# Patient Record
Sex: FEMALE | Race: WHITE | Employment: OTHER | URBAN - NONMETROPOLITAN AREA
[De-identification: names, ages, dates, MRNs, and addresses within clinical notes are randomized per-mention and may not be internally consistent; named-entity substitution may affect disease eponyms.]

---

## 2017-04-28 ENCOUNTER — HOSPITAL ENCOUNTER (OUTPATIENT)
Dept: WOMENS IMAGING | Age: 57
Discharge: HOME | End: 2017-04-28
Payer: MEDICARE

## 2017-04-28 DIAGNOSIS — Z12.31 ENCOUNTER FOR SCREENING MAMMOGRAM FOR BREAST CANCER: ICD-10-CM

## 2017-04-28 PROCEDURE — 77063 BREAST TOMOSYNTHESIS BI: CPT

## 2017-05-31 ENCOUNTER — OFFICE VISIT (OUTPATIENT)
Dept: CARDIOLOGY | Age: 57
End: 2017-05-31
Payer: MEDICARE

## 2017-05-31 VITALS
HEIGHT: 60 IN | BODY MASS INDEX: 39.46 KG/M2 | WEIGHT: 201 LBS | DIASTOLIC BLOOD PRESSURE: 74 MMHG | HEART RATE: 80 BPM | SYSTOLIC BLOOD PRESSURE: 112 MMHG

## 2017-05-31 DIAGNOSIS — I10 ESSENTIAL HYPERTENSION: ICD-10-CM

## 2017-05-31 DIAGNOSIS — Z72.0 TOBACCO ABUSE: ICD-10-CM

## 2017-05-31 DIAGNOSIS — E78.2 MIXED HYPERLIPIDEMIA: ICD-10-CM

## 2017-05-31 DIAGNOSIS — I25.10 CORONARY ARTERY DISEASE INVOLVING NATIVE CORONARY ARTERY OF NATIVE HEART WITHOUT ANGINA PECTORIS: Primary | ICD-10-CM

## 2017-05-31 PROCEDURE — G8427 DOCREV CUR MEDS BY ELIG CLIN: HCPCS | Performed by: CLINICAL NURSE SPECIALIST

## 2017-05-31 PROCEDURE — 3014F SCREEN MAMMO DOC REV: CPT | Performed by: CLINICAL NURSE SPECIALIST

## 2017-05-31 PROCEDURE — 99213 OFFICE O/P EST LOW 20 MIN: CPT | Performed by: CLINICAL NURSE SPECIALIST

## 2017-05-31 PROCEDURE — 4004F PT TOBACCO SCREEN RCVD TLK: CPT | Performed by: CLINICAL NURSE SPECIALIST

## 2017-05-31 PROCEDURE — 3017F COLORECTAL CA SCREEN DOC REV: CPT | Performed by: CLINICAL NURSE SPECIALIST

## 2017-05-31 PROCEDURE — G8417 CALC BMI ABV UP PARAM F/U: HCPCS | Performed by: CLINICAL NURSE SPECIALIST

## 2017-05-31 PROCEDURE — 93000 ELECTROCARDIOGRAM COMPLETE: CPT | Performed by: CLINICAL NURSE SPECIALIST

## 2017-05-31 PROCEDURE — G8598 ASA/ANTIPLAT THER USED: HCPCS | Performed by: CLINICAL NURSE SPECIALIST

## 2017-05-31 RX ORDER — NITROGLYCERIN 0.4 MG/1
0.4 TABLET SUBLINGUAL EVERY 5 MIN PRN
COMMUNITY
End: 2017-11-30 | Stop reason: DRUGHIGH

## 2017-05-31 RX ORDER — POLYETHYLENE GLYCOL 3350 17 G/17G
17 POWDER, FOR SOLUTION ORAL PRN
COMMUNITY
Start: 2017-05-16 | End: 2019-03-12

## 2017-05-31 RX ORDER — OMEPRAZOLE 20 MG/1
20 CAPSULE, DELAYED RELEASE ORAL DAILY
COMMUNITY
Start: 2017-05-16

## 2017-05-31 ASSESSMENT — ENCOUNTER SYMPTOMS
HEARTBURN: 0
NAUSEA: 0
SHORTNESS OF BREATH: 0
ABDOMINAL PAIN: 0
COUGH: 0
ORTHOPNEA: 0
BLURRED VISION: 0
VOMITING: 0

## 2017-11-30 ENCOUNTER — OFFICE VISIT (OUTPATIENT)
Dept: CARDIOLOGY | Age: 57
End: 2017-11-30
Payer: MEDICARE

## 2017-11-30 VITALS
DIASTOLIC BLOOD PRESSURE: 70 MMHG | SYSTOLIC BLOOD PRESSURE: 114 MMHG | BODY MASS INDEX: 40.64 KG/M2 | WEIGHT: 207 LBS | HEIGHT: 60 IN | HEART RATE: 76 BPM

## 2017-11-30 DIAGNOSIS — F17.210 CIGARETTE NICOTINE DEPENDENCE WITHOUT COMPLICATION: ICD-10-CM

## 2017-11-30 DIAGNOSIS — I10 ESSENTIAL HYPERTENSION: ICD-10-CM

## 2017-11-30 DIAGNOSIS — I25.10 CORONARY ARTERY DISEASE INVOLVING NATIVE CORONARY ARTERY OF NATIVE HEART WITHOUT ANGINA PECTORIS: Primary | ICD-10-CM

## 2017-11-30 DIAGNOSIS — E78.2 MIXED HYPERLIPIDEMIA: ICD-10-CM

## 2017-11-30 PROBLEM — Z72.0 TOBACCO ABUSE: Status: RESOLVED | Noted: 2017-05-31 | Resolved: 2017-11-30

## 2017-11-30 PROCEDURE — 3014F SCREEN MAMMO DOC REV: CPT | Performed by: CLINICAL NURSE SPECIALIST

## 2017-11-30 PROCEDURE — G8417 CALC BMI ABV UP PARAM F/U: HCPCS | Performed by: CLINICAL NURSE SPECIALIST

## 2017-11-30 PROCEDURE — G8427 DOCREV CUR MEDS BY ELIG CLIN: HCPCS | Performed by: CLINICAL NURSE SPECIALIST

## 2017-11-30 PROCEDURE — 4004F PT TOBACCO SCREEN RCVD TLK: CPT | Performed by: CLINICAL NURSE SPECIALIST

## 2017-11-30 PROCEDURE — G8484 FLU IMMUNIZE NO ADMIN: HCPCS | Performed by: CLINICAL NURSE SPECIALIST

## 2017-11-30 PROCEDURE — 99213 OFFICE O/P EST LOW 20 MIN: CPT | Performed by: CLINICAL NURSE SPECIALIST

## 2017-11-30 PROCEDURE — 99406 BEHAV CHNG SMOKING 3-10 MIN: CPT | Performed by: CLINICAL NURSE SPECIALIST

## 2017-11-30 PROCEDURE — G8598 ASA/ANTIPLAT THER USED: HCPCS | Performed by: CLINICAL NURSE SPECIALIST

## 2017-11-30 PROCEDURE — 3017F COLORECTAL CA SCREEN DOC REV: CPT | Performed by: CLINICAL NURSE SPECIALIST

## 2017-11-30 RX ORDER — CIMETIDINE 400 MG/1
400 TABLET, FILM COATED ORAL 2 TIMES DAILY
COMMUNITY
End: 2019-03-12 | Stop reason: ALTCHOICE

## 2017-11-30 ASSESSMENT — ENCOUNTER SYMPTOMS
NAUSEA: 0
ABDOMINAL PAIN: 0
BLURRED VISION: 0
ORTHOPNEA: 0
VOMITING: 0
SHORTNESS OF BREATH: 1
COUGH: 0
HEARTBURN: 0

## 2017-11-30 NOTE — PATIENT INSTRUCTIONS
your doctor about bupropion (Wellbutrin) or varenicline (Chantix), which are prescription medicines. They do not contain nicotine. They help you by reducing withdrawal symptoms, such as stress and anxiety. · Some people find hypnosis, acupuncture, and massage helpful for ending the smoking habit. · Eat a healthy diet and get regular exercise. Having healthy habits will help your body move past its craving for nicotine. · Be prepared to keep trying. Most people are not successful the first few times they try to quit. Do not get mad at yourself if you smoke again. Make a list of things you learned and think about when you want to try again, such as next week, next month, or next year. Where can you learn more? Go to https://Weatlas.[employer] [email or web address]. org and sign in to your [employer] account. Enter S902 in the Beem box to learn more about \"Stopping Smoking: Care Instructions. \"     If you do not have an account, please click on the \"Sign Up Now\" link. Current as of: March 20, 2017  Content Version: 11.3  © 7248-9101 [employer]. Care instructions adapted under license by [facility] [place] ([facility]). If you have questions about a medical condition or this instruction, always ask your healthcare professional. [address] any warranty or liability for your use of this information.

## 2017-11-30 NOTE — PROGRESS NOTES
[facility], [address] [address] ROMAINE Null [facility] [place], [address] [address]  Phone: [phone]  Fax: [phone]    OFFICE VISIT:  2017    Lynn Godfrey - : 1960    Reason For Visit:  Raine Ventura is a 61 y.o. female who is here for 6 Month Follow-Up (pt states no cardiac symptoms at this time ) and Coronary Artery Disease    HPI   Coronary Artery Disease   Presents for follow-up visit. Symptoms include palpitations (rare, brief). Pertinent negatives include no chest pain, dizziness, leg swelling or shortness of breath. The symptoms have been stable. Compliance with diet is variable. Compliance with exercise is variable (starting to walk). Compliance with medications is good. Nahid Watson MD is PCP. Susie Grullon has the following history as recorded in [facility]:    Patient Active Problem List    Diagnosis Date Noted    Cigarette nicotine dependence without complication     Essential hypertension 2016    CAD (coronary artery disease)     Chest pain     Hyperlipemia     History of myocardial infarction     Ventricular fibrillation (HCC)     Fluid retention      Past Medical History:   Diagnosis Date    CAD (coronary artery disease)     Chest pain     Cholelithiasis     Cigarette nicotine dependence without complication     Fluid retention     History of myocardial infarction     History of tobacco abuse     Hyperlipemia     Tobacco abuse 2017    Ventricular fibrillation [facility])      Past Surgical History:   Procedure Laterality Date    CARDIAC CATHETERIZATION  10/24/1994    EF in excess of 50%.  CARDIAC CATHETERIZATION  1993    Emergent - [facility] - EF in excess of 50%. History reviewed. No pertinent family history.   Social History   Substance Use Topics    Smoking status: Current Every Day Smoker     Packs/day: 0.50     Types: Cigarettes    Smokeless tobacco: Never Used    Alcohol use Not on file      Current Physical Exam   Constitutional: She is oriented to person, place, and time. She appears well-developed and well-nourished. No distress. HENT:   Head: Normocephalic and atraumatic. Eyes: Pupils are equal, round, and reactive to light. Right eye exhibits no discharge. Left eye exhibits no discharge. Neck: No JVD present. No tracheal deviation present. Cardiovascular: Normal rate, regular rhythm, normal heart sounds and intact distal pulses. Exam reveals no gallop and no friction rub. No murmur heard. No carotid bruit   Pulmonary/Chest: Effort normal and breath sounds normal. No respiratory distress. She has no wheezes. She has no rales. Abdominal: Soft. There is no tenderness. Musculoskeletal: She exhibits no edema. Normal gait and station   Neurological: She is alert and oriented to person, place, and time. No cranial nerve deficit. Skin: Skin is warm and dry. No rash noted. Psychiatric: She has a normal mood and affect. Her behavior is normal. Judgment normal.   Nursing note and vitals reviewed. Assessment:    1. Coronary artery disease involving native coronary artery of native heart without angina pectoris     2. Essential hypertension     3. Mixed hyperlipidemia     4. Cigarette nicotine dependence without complication       Patient is taking medications as prescribed    CADstable without angina  Hypertension is well controlled  Hyperlipidemiamanaged by PCP and well controlled per patient report  Nicotine dependence- Instructed patient in smoking cessation rationales, strategies, and available resources x 3 minutes. Patient would consider nicotine replacement therapy    Stable cardiovascular status. No evidence of overt heart failure, angina or dysrhythmia. Plan    Followup With Dr. Marcello Sosa 6mo  Quit smoking.   Call the [address] [place] [address] 1-800-QUIT-NOW    Call with any questions or concerns  Follow up with Lidia Rubio MD for non cardiac problems  Report any new

## 2018-05-08 ENCOUNTER — HOSPITAL ENCOUNTER (OUTPATIENT)
Dept: WOMENS IMAGING | Age: 58
Discharge: HOME | End: 2018-05-08
Payer: MEDICARE

## 2018-05-08 DIAGNOSIS — Z12.39 BREAST CANCER SCREENING: ICD-10-CM

## 2018-05-08 PROCEDURE — 77063 BREAST TOMOSYNTHESIS BI: CPT

## 2019-01-29 ENCOUNTER — OFFICE VISIT (OUTPATIENT)
Dept: CARDIOLOGY | Age: 59
End: 2019-01-29
Payer: MEDICARE

## 2019-01-29 VITALS
HEIGHT: 60 IN | BODY MASS INDEX: 42.8 KG/M2 | WEIGHT: 218 LBS | HEART RATE: 66 BPM | SYSTOLIC BLOOD PRESSURE: 122 MMHG | DIASTOLIC BLOOD PRESSURE: 74 MMHG

## 2019-01-29 DIAGNOSIS — R07.89 OTHER CHEST PAIN: ICD-10-CM

## 2019-01-29 DIAGNOSIS — I25.10 CORONARY ARTERY DISEASE INVOLVING NATIVE CORONARY ARTERY OF NATIVE HEART WITHOUT ANGINA PECTORIS: Primary | ICD-10-CM

## 2019-01-29 DIAGNOSIS — R06.09 DOE (DYSPNEA ON EXERTION): ICD-10-CM

## 2019-01-29 DIAGNOSIS — I10 ESSENTIAL HYPERTENSION: ICD-10-CM

## 2019-01-29 DIAGNOSIS — Z82.49 FAMILY HISTORY OF AORTIC ANEURYSM: ICD-10-CM

## 2019-01-29 DIAGNOSIS — Z72.0 TOBACCO ABUSE: ICD-10-CM

## 2019-01-29 DIAGNOSIS — R60.9 FLUID RETENTION: ICD-10-CM

## 2019-01-29 DIAGNOSIS — I25.2 HISTORY OF MYOCARDIAL INFARCTION: ICD-10-CM

## 2019-01-29 DIAGNOSIS — R94.31 ABNORMAL EKG: ICD-10-CM

## 2019-01-29 DIAGNOSIS — I73.9 PVD (PERIPHERAL VASCULAR DISEASE) (HCC): ICD-10-CM

## 2019-01-29 DIAGNOSIS — E78.5 DYSLIPIDEMIA: ICD-10-CM

## 2019-01-29 PROCEDURE — G8598 ASA/ANTIPLAT THER USED: HCPCS | Performed by: INTERNAL MEDICINE

## 2019-01-29 PROCEDURE — 93000 ELECTROCARDIOGRAM COMPLETE: CPT | Performed by: INTERNAL MEDICINE

## 2019-01-29 PROCEDURE — G8417 CALC BMI ABV UP PARAM F/U: HCPCS | Performed by: INTERNAL MEDICINE

## 2019-01-29 PROCEDURE — G8484 FLU IMMUNIZE NO ADMIN: HCPCS | Performed by: INTERNAL MEDICINE

## 2019-01-29 PROCEDURE — 99213 OFFICE O/P EST LOW 20 MIN: CPT | Performed by: INTERNAL MEDICINE

## 2019-01-29 PROCEDURE — 3017F COLORECTAL CA SCREEN DOC REV: CPT | Performed by: INTERNAL MEDICINE

## 2019-01-29 PROCEDURE — 4004F PT TOBACCO SCREEN RCVD TLK: CPT | Performed by: INTERNAL MEDICINE

## 2019-01-29 PROCEDURE — G8427 DOCREV CUR MEDS BY ELIG CLIN: HCPCS | Performed by: INTERNAL MEDICINE

## 2019-01-29 RX ORDER — VARENICLINE TARTRATE 25 MG
KIT ORAL
Qty: 53 EACH | Refills: 0 | Status: SHIPPED | OUTPATIENT
Start: 2019-01-29 | End: 2019-03-12

## 2019-02-07 DIAGNOSIS — I25.10 CAD (CORONARY ARTERY DISEASE): ICD-10-CM

## 2019-02-07 RX ORDER — ATENOLOL 50 MG/1
TABLET ORAL
Qty: 30 TABLET | Refills: 0 | Status: SHIPPED | OUTPATIENT
Start: 2019-02-07 | End: 2019-03-11 | Stop reason: SDUPTHER

## 2019-02-11 ENCOUNTER — HOSPITAL ENCOUNTER (OUTPATIENT)
Dept: WOMENS IMAGING | Age: 59
Discharge: HOME | End: 2019-02-11
Payer: MEDICARE

## 2019-02-11 ENCOUNTER — HOSPITAL ENCOUNTER (OUTPATIENT)
Dept: NON INVASIVE DIAGNOSTICS | Age: 59
Discharge: HOME | End: 2019-02-11
Payer: MEDICARE

## 2019-02-11 VITALS
WEIGHT: 217.81 LBS | HEART RATE: 87 BPM | BODY MASS INDEX: 42.54 KG/M2 | SYSTOLIC BLOOD PRESSURE: 109 MMHG | DIASTOLIC BLOOD PRESSURE: 58 MMHG

## 2019-02-11 DIAGNOSIS — R06.09 DOE (DYSPNEA ON EXERTION): ICD-10-CM

## 2019-02-11 DIAGNOSIS — I10 ESSENTIAL HYPERTENSION: ICD-10-CM

## 2019-02-11 DIAGNOSIS — I73.9 PVD (PERIPHERAL VASCULAR DISEASE) (HCC): ICD-10-CM

## 2019-02-11 DIAGNOSIS — Z82.49 FAMILY HISTORY OF AORTIC ANEURYSM: ICD-10-CM

## 2019-02-11 DIAGNOSIS — E78.5 DYSLIPIDEMIA: ICD-10-CM

## 2019-02-11 DIAGNOSIS — R94.31 ABNORMAL EKG: ICD-10-CM

## 2019-02-11 DIAGNOSIS — I25.10 CORONARY ARTERY DISEASE INVOLVING NATIVE CORONARY ARTERY OF NATIVE HEART WITHOUT ANGINA PECTORIS: ICD-10-CM

## 2019-02-11 PROCEDURE — 6360000002 HC RX W HCPCS: Performed by: INTERNAL MEDICINE

## 2019-02-11 PROCEDURE — 93350 STRESS TTE ONLY: CPT

## 2019-02-11 PROCEDURE — 2580000003 HC RX 258: Performed by: INTERNAL MEDICINE

## 2019-02-11 PROCEDURE — 93017 CV STRESS TEST TRACING ONLY: CPT

## 2019-02-11 PROCEDURE — 76700 US EXAM ABDOM COMPLETE: CPT

## 2019-02-11 RX ORDER — ATROPINE SULFATE 0.1 MG/ML
1 INJECTION INTRAVENOUS PRN
Status: ACTIVE | OUTPATIENT
Start: 2019-02-11 | End: 2019-02-12

## 2019-02-11 RX ORDER — SODIUM CHLORIDE 9 MG/ML
INJECTION, SOLUTION INTRAVENOUS
Status: COMPLETED | OUTPATIENT
Start: 2019-02-11 | End: 2019-02-11

## 2019-02-11 RX ORDER — DOBUTAMINE HYDROCHLORIDE 200 MG/100ML
10 INJECTION INTRAVENOUS CONTINUOUS PRN
Status: ACTIVE | OUTPATIENT
Start: 2019-02-11 | End: 2019-02-12

## 2019-02-11 RX ADMIN — ATROPINE SULFATE 1 MG: 0.1 INJECTION PARENTERAL at 10:49

## 2019-02-11 RX ADMIN — SODIUM CHLORIDE: 9 INJECTION, SOLUTION INTRAVENOUS at 10:40

## 2019-02-11 RX ADMIN — DOBUTAMINE HYDROCHLORIDE 10 MCG/KG/MIN: 200 INJECTION INTRAVENOUS at 10:40

## 2019-02-28 ENCOUNTER — TELEPHONE (OUTPATIENT)
Dept: CARDIOLOGY | Age: 59
End: 2019-02-28

## 2019-03-11 DIAGNOSIS — I25.10 CAD (CORONARY ARTERY DISEASE): ICD-10-CM

## 2019-03-11 RX ORDER — ATENOLOL 50 MG/1
TABLET ORAL
Qty: 30 TABLET | Refills: 0 | Status: SHIPPED | OUTPATIENT
Start: 2019-03-11 | End: 2019-04-09 | Stop reason: SDUPTHER

## 2019-03-12 ENCOUNTER — OFFICE VISIT (OUTPATIENT)
Dept: CARDIOLOGY | Age: 59
End: 2019-03-12
Payer: MEDICARE

## 2019-03-12 VITALS
SYSTOLIC BLOOD PRESSURE: 130 MMHG | DIASTOLIC BLOOD PRESSURE: 82 MMHG | HEART RATE: 76 BPM | BODY MASS INDEX: 42.41 KG/M2 | HEIGHT: 60 IN | WEIGHT: 216 LBS

## 2019-03-12 DIAGNOSIS — I25.10 CORONARY ARTERY DISEASE INVOLVING NATIVE CORONARY ARTERY OF NATIVE HEART WITHOUT ANGINA PECTORIS: Primary | ICD-10-CM

## 2019-03-12 DIAGNOSIS — E78.5 DYSLIPIDEMIA: ICD-10-CM

## 2019-03-12 DIAGNOSIS — R06.09 DOE (DYSPNEA ON EXERTION): ICD-10-CM

## 2019-03-12 DIAGNOSIS — Z71.6 TOBACCO ABUSE COUNSELING: ICD-10-CM

## 2019-03-12 DIAGNOSIS — I10 ESSENTIAL HYPERTENSION: ICD-10-CM

## 2019-03-12 PROCEDURE — 3017F COLORECTAL CA SCREEN DOC REV: CPT | Performed by: NURSE PRACTITIONER

## 2019-03-12 PROCEDURE — 4004F PT TOBACCO SCREEN RCVD TLK: CPT | Performed by: NURSE PRACTITIONER

## 2019-03-12 PROCEDURE — G8598 ASA/ANTIPLAT THER USED: HCPCS | Performed by: NURSE PRACTITIONER

## 2019-03-12 PROCEDURE — G8484 FLU IMMUNIZE NO ADMIN: HCPCS | Performed by: NURSE PRACTITIONER

## 2019-03-12 PROCEDURE — G8427 DOCREV CUR MEDS BY ELIG CLIN: HCPCS | Performed by: NURSE PRACTITIONER

## 2019-03-12 PROCEDURE — G8417 CALC BMI ABV UP PARAM F/U: HCPCS | Performed by: NURSE PRACTITIONER

## 2019-03-12 PROCEDURE — 99213 OFFICE O/P EST LOW 20 MIN: CPT | Performed by: NURSE PRACTITIONER

## 2019-04-04 ENCOUNTER — TELEPHONE (OUTPATIENT)
Dept: CARDIOLOGY | Age: 59
End: 2019-04-04

## 2019-04-04 NOTE — TELEPHONE ENCOUNTER
Patient needs peer to peer for Lynne Thomas approval. Lolly Gustafson out of the office this week. Asked Thalia Marroquin if she could look at this and she said Lolly Prior would need to do it. Patient is scheduled for Lolly on Monday and will need to r/s this test since not approved yet. I called patient and she as not at home, her sister answered. She gave me another number to call pt [phone] which was disconnected. Will follow up with patient about getting this r/s. This procedure is pending additional clinical I have provided all clinical available in epic. Please perform a peer to peer to ensure this is approved before the date of procedure to avoid cancellation for lack of authorization. They are requesting heart cath or CCTA data which is not available to me. Please be advised that your request, made on 4/3/2019, for patient Brenda Ervin, for   [id number] - Myocardial perfusion imaging (MPI), a special test to check the blood flow to your heart   and how well your heart is pumping - 1 CPT units, is pending. Fax with         Source Details: Marita Steinberg  [phone]   Case/Tracking/Auth/Ref # : [id number]   Case Status: pending clinical review --uploaded ekg-12 lead, stress report and last 2 ov notes     Any additional questions feel free to contact me at [phone] my name is Tial Neves.

## 2019-04-05 NOTE — TELEPHONE ENCOUNTER
Spoke with patient and advised her that a peer to peer will be needed on this. She voiced understanding. Her isac has been rescheduled to 05/07/2019 arrive at [facility] for 7:30 appt.  I advised patient that I would call her back once we hear from insurance and she voiced understanding,

## 2019-04-09 DIAGNOSIS — I25.10 CAD (CORONARY ARTERY DISEASE): ICD-10-CM

## 2019-04-09 RX ORDER — ATENOLOL 50 MG/1
TABLET ORAL
Qty: 30 TABLET | Refills: 5 | Status: SHIPPED | OUTPATIENT
Start: 2019-04-09 | End: 2019-10-07 | Stop reason: SDUPTHER

## 2019-04-16 NOTE — TELEPHONE ENCOUNTER
MANOLO Dennis LPN             Per Missy Kwon at [place]   This was already approved. Approval # [id number] valid for CPT 61435 good from 4/4/19 through 5/1919.    Thanks, MANOLO Tay

## 2019-05-07 ENCOUNTER — HOSPITAL ENCOUNTER (OUTPATIENT)
Dept: NON INVASIVE DIAGNOSTICS | Age: 59
Discharge: HOME | End: 2019-05-07
Payer: MEDICARE

## 2019-05-07 ENCOUNTER — HOSPITAL ENCOUNTER (OUTPATIENT)
Dept: NUCLEAR MEDICINE | Age: 59
Discharge: HOME | End: 2019-05-09
Payer: MEDICARE

## 2019-05-07 DIAGNOSIS — I25.10 CORONARY ARTERY DISEASE INVOLVING NATIVE CORONARY ARTERY OF NATIVE HEART WITHOUT ANGINA PECTORIS: ICD-10-CM

## 2019-05-07 DIAGNOSIS — R06.09 DOE (DYSPNEA ON EXERTION): ICD-10-CM

## 2019-05-07 PROCEDURE — A9500 TC99M SESTAMIBI: HCPCS | Performed by: NURSE PRACTITIONER

## 2019-05-07 PROCEDURE — 93017 CV STRESS TEST TRACING ONLY: CPT

## 2019-05-07 PROCEDURE — 3430000000 HC RX DIAGNOSTIC RADIOPHARMACEUTICAL: Performed by: NURSE PRACTITIONER

## 2019-05-07 PROCEDURE — 78452 HT MUSCLE IMAGE SPECT MULT: CPT

## 2019-05-07 PROCEDURE — 6360000002 HC RX W HCPCS: Performed by: INTERNAL MEDICINE

## 2019-05-07 RX ADMIN — TETRAKIS(2-METHOXYISOBUTYLISOCYANIDE)COPPER(I) TETRAFLUOROBORATE 30 MILLICURIE: 1 INJECTION, POWDER, LYOPHILIZED, FOR SOLUTION INTRAVENOUS at 11:08

## 2019-05-07 RX ADMIN — REGADENOSON 0.4 MG: 0.08 INJECTION, SOLUTION INTRAVENOUS at 08:50

## 2019-05-07 RX ADMIN — TETRAKIS(2-METHOXYISOBUTYLISOCYANIDE)COPPER(I) TETRAFLUOROBORATE 10 MILLICURIE: 1 INJECTION, POWDER, LYOPHILIZED, FOR SOLUTION INTRAVENOUS at 11:08

## 2019-05-08 ENCOUNTER — TELEPHONE (OUTPATIENT)
Dept: CARDIOLOGY | Age: 59
End: 2019-05-08

## 2019-05-08 LAB
LV EF: 76 %
LVEF MODALITY: NORMAL

## 2019-05-08 NOTE — TELEPHONE ENCOUNTER
Result Notes for NM MYOCARDIAL SPECT REST EXERCISE OR RX     Notes recorded by Claire Lay MA on 5/8/2019 at 8:53 AM CDT  Called patient to discuss says she doesn't want to do anything with Dr. Kusum Marin until her office appointment with you tomorrow. Please keep patient on the schedule.  ------    Notes recorded by MANOLO Yanes on 5/7/2019 at 5:42 PM CDT  Please notify patient of positive lexiscan. Schedule with Dr. Kusum Marin for heart cath.

## 2019-05-09 ENCOUNTER — OFFICE VISIT (OUTPATIENT)
Dept: CARDIOLOGY | Age: 59
End: 2019-05-09
Payer: MEDICARE

## 2019-05-09 VITALS
DIASTOLIC BLOOD PRESSURE: 62 MMHG | WEIGHT: 214 LBS | HEART RATE: 66 BPM | SYSTOLIC BLOOD PRESSURE: 118 MMHG | HEIGHT: 60 IN | BODY MASS INDEX: 42.01 KG/M2

## 2019-05-09 DIAGNOSIS — I25.10 CORONARY ARTERY DISEASE INVOLVING NATIVE CORONARY ARTERY OF NATIVE HEART WITHOUT ANGINA PECTORIS: Primary | ICD-10-CM

## 2019-05-09 DIAGNOSIS — R94.39 ABNORMAL NUCLEAR STRESS TEST: ICD-10-CM

## 2019-05-09 DIAGNOSIS — E78.5 DYSLIPIDEMIA: ICD-10-CM

## 2019-05-09 DIAGNOSIS — I10 ESSENTIAL HYPERTENSION: ICD-10-CM

## 2019-05-09 PROCEDURE — G8427 DOCREV CUR MEDS BY ELIG CLIN: HCPCS | Performed by: NURSE PRACTITIONER

## 2019-05-09 PROCEDURE — 4004F PT TOBACCO SCREEN RCVD TLK: CPT | Performed by: NURSE PRACTITIONER

## 2019-05-09 PROCEDURE — 3017F COLORECTAL CA SCREEN DOC REV: CPT | Performed by: NURSE PRACTITIONER

## 2019-05-09 PROCEDURE — 99213 OFFICE O/P EST LOW 20 MIN: CPT | Performed by: NURSE PRACTITIONER

## 2019-05-09 PROCEDURE — G8598 ASA/ANTIPLAT THER USED: HCPCS | Performed by: NURSE PRACTITIONER

## 2019-05-09 PROCEDURE — 93000 ELECTROCARDIOGRAM COMPLETE: CPT | Performed by: NURSE PRACTITIONER

## 2019-05-09 PROCEDURE — G8417 CALC BMI ABV UP PARAM F/U: HCPCS | Performed by: NURSE PRACTITIONER

## 2019-05-09 NOTE — PATIENT INSTRUCTIONS
day prior to procedure. · You should arrange to have someone take you home rather than drive yourself. · Further plan will depend upon the result of the cardiac catheterization. If for any reason you are unable to keep this appointment, please contact [facility], [phone], as soon as possible to reschedule. WHAT IS A CARDIAC CATHETERIZATION? This is a procedure that providers your cardiologist with detailed information regarding how your heart functions. A small catheter (long, fine tube) is inserted into an artery (a vessel that carries blood and oxygen) that leads to your heart. While watching with x-ray equipment, small amounts of dye are injected which enables visualization of the heart arteries and the heart chambers. The pictures that your cardiologist receives from the cardiac catheterization enables him to decide on the best method of treatment for you.

## 2019-05-17 ENCOUNTER — HOSPITAL ENCOUNTER (OUTPATIENT)
Dept: CARDIAC CATH/INVASIVE PROCEDURES | Age: 59
Discharge: HOME | End: 2019-05-17
Attending: INTERNAL MEDICINE | Admitting: INTERNAL MEDICINE
Payer: MEDICARE

## 2019-05-17 VITALS
OXYGEN SATURATION: 92 % | HEART RATE: 75 BPM | TEMPERATURE: 97.6 F | HEIGHT: 60 IN | WEIGHT: 214 LBS | DIASTOLIC BLOOD PRESSURE: 69 MMHG | BODY MASS INDEX: 42.01 KG/M2 | RESPIRATION RATE: 14 BRPM | SYSTOLIC BLOOD PRESSURE: 134 MMHG

## 2019-05-17 LAB
ALBUMIN SERPL-MCNC: 4 G/DL (ref 3.5–5.2)
ALP BLD-CCNC: 177 U/L (ref 35–104)
ALT SERPL-CCNC: 22 U/L (ref 5–33)
ANION GAP SERPL CALCULATED.3IONS-SCNC: 7 MMOL/L (ref 7–19)
AST SERPL-CCNC: 22 U/L (ref 5–32)
BILIRUB SERPL-MCNC: 0.3 MG/DL (ref 0.2–1.2)
BUN BLDV-MCNC: 19 MG/DL (ref 8–23)
CALCIUM SERPL-MCNC: 9.2 MG/DL (ref 8.8–10.2)
CHLORIDE BLD-SCNC: 106 MMOL/L (ref 98–111)
CO2: 30 MMOL/L (ref 22–29)
CREAT SERPL-MCNC: 0.6 MG/DL (ref 0.5–0.9)
GFR NON-AFRICAN AMERICAN: >60
GLUCOSE BLD-MCNC: 112 MG/DL (ref 74–109)
HCT VFR BLD CALC: 44.2 % (ref 37–47)
HEMOGLOBIN: 14.2 G/DL (ref 12–16)
MCH RBC QN AUTO: 28.8 PG (ref 27–31)
MCHC RBC AUTO-ENTMCNC: 32.1 G/DL (ref 33–37)
MCV RBC AUTO: 89.7 FL (ref 81–99)
PDW BLD-RTO: 14.5 % (ref 11.5–14.5)
PLATELET # BLD: 269 K/UL (ref 130–400)
PMV BLD AUTO: 9 FL (ref 9.4–12.3)
POTASSIUM SERPL-SCNC: 4.8 MMOL/L (ref 3.5–5)
RBC # BLD: 4.93 M/UL (ref 4.2–5.4)
SODIUM BLD-SCNC: 143 MMOL/L (ref 136–145)
TOTAL PROTEIN: 7.2 G/DL (ref 6.6–8.7)
WBC # BLD: 6.3 K/UL (ref 4.8–10.8)

## 2019-05-17 PROCEDURE — 99153 MOD SED SAME PHYS/QHP EA: CPT

## 2019-05-17 PROCEDURE — 2580000003 HC RX 258: Performed by: INTERNAL MEDICINE

## 2019-05-17 PROCEDURE — 2500000003 HC RX 250 WO HCPCS

## 2019-05-17 PROCEDURE — 99152 MOD SED SAME PHYS/QHP 5/>YRS: CPT

## 2019-05-17 PROCEDURE — 93458 L HRT ARTERY/VENTRICLE ANGIO: CPT

## 2019-05-17 PROCEDURE — C1769 GUIDE WIRE: HCPCS

## 2019-05-17 PROCEDURE — 2709999900 HC NON-CHARGEABLE SUPPLY

## 2019-05-17 PROCEDURE — C1894 INTRO/SHEATH, NON-LASER: HCPCS

## 2019-05-17 PROCEDURE — 6360000004 HC RX CONTRAST MEDICATION: Performed by: INTERNAL MEDICINE

## 2019-05-17 PROCEDURE — 6360000002 HC RX W HCPCS

## 2019-05-17 PROCEDURE — 85027 COMPLETE CBC AUTOMATED: CPT

## 2019-05-17 PROCEDURE — 36415 COLL VENOUS BLD VENIPUNCTURE: CPT

## 2019-05-17 PROCEDURE — C1887 CATHETER, GUIDING: HCPCS

## 2019-05-17 PROCEDURE — 80053 COMPREHEN METABOLIC PANEL: CPT

## 2019-05-17 RX ORDER — ASPIRIN 325 MG
325 TABLET ORAL ONCE
Status: DISCONTINUED | OUTPATIENT
Start: 2019-05-17 | End: 2019-05-17 | Stop reason: HOSPADM

## 2019-05-17 RX ORDER — NITROGLYCERIN 0.4 MG/1
0.4 TABLET SUBLINGUAL EVERY 5 MIN PRN
Status: DISCONTINUED | OUTPATIENT
Start: 2019-05-17 | End: 2019-05-17 | Stop reason: HOSPADM

## 2019-05-17 RX ORDER — SODIUM CHLORIDE 9 MG/ML
INJECTION, SOLUTION INTRAVENOUS CONTINUOUS
Status: CANCELLED | OUTPATIENT
Start: 2019-05-17

## 2019-05-17 RX ORDER — ACETAMINOPHEN 325 MG/1
650 TABLET ORAL EVERY 4 HOURS PRN
Status: CANCELLED | OUTPATIENT
Start: 2019-05-17

## 2019-05-17 RX ORDER — SODIUM CHLORIDE 0.9 % (FLUSH) 0.9 %
10 SYRINGE (ML) INJECTION EVERY 12 HOURS SCHEDULED
Status: CANCELLED | OUTPATIENT
Start: 2019-05-17

## 2019-05-17 RX ORDER — ONDANSETRON 2 MG/ML
4 INJECTION INTRAMUSCULAR; INTRAVENOUS EVERY 6 HOURS PRN
Status: DISCONTINUED | OUTPATIENT
Start: 2019-05-17 | End: 2019-05-17 | Stop reason: HOSPADM

## 2019-05-17 RX ORDER — SODIUM CHLORIDE 0.9 % (FLUSH) 0.9 %
10 SYRINGE (ML) INJECTION PRN
Status: CANCELLED | OUTPATIENT
Start: 2019-05-17

## 2019-05-17 RX ORDER — ALPRAZOLAM 0.5 MG/1
0.5 TABLET ORAL
Status: DISCONTINUED | OUTPATIENT
Start: 2019-05-17 | End: 2019-05-17 | Stop reason: HOSPADM

## 2019-05-17 RX ORDER — ONDANSETRON 2 MG/ML
4 INJECTION INTRAMUSCULAR; INTRAVENOUS EVERY 6 HOURS PRN
Status: CANCELLED | OUTPATIENT
Start: 2019-05-17

## 2019-05-17 RX ORDER — SODIUM CHLORIDE 9 MG/ML
INJECTION, SOLUTION INTRAVENOUS CONTINUOUS
Status: DISCONTINUED | OUTPATIENT
Start: 2019-05-17 | End: 2019-05-17 | Stop reason: HOSPADM

## 2019-05-17 RX ADMIN — IOPAMIDOL 106 ML: 612 INJECTION, SOLUTION INTRAVENOUS at 17:10

## 2019-05-17 RX ADMIN — SODIUM CHLORIDE: 9 INJECTION, SOLUTION INTRAVENOUS at 14:03

## 2019-05-17 SDOH — HEALTH STABILITY: MENTAL HEALTH: HOW OFTEN DO YOU HAVE A DRINK CONTAINING ALCOHOL?: NEVER

## 2019-05-17 NOTE — PROGRESS NOTES
Patient returned from Cath Lab with TR band 11ml air. Hand feels slightly numb around the wrist but patient voices no complaints. Family notified via phone regarding findings of cath and recovery time. Offered patient a sandwich and soft drink and accepted.

## 2019-05-17 NOTE — H&P
Ms. Eric Sharma. She presents today at the [address] [place] [address] in the [facility]. As you know, Ms. Godfrey is a 60 y.o. female with history of hypertension, hyperlipidemia, V. fib arrest, tobacco abuse, and CAD who presents with the chief complaint of follow-up of testing. She is a patient of Dr. Pérez Ayala.      Patient was seen last month by Dr. Denia Calderon. She has known history of acute infarction in July 1993 with sustained V. fib arrest. Heart cath at that time showed what appeared to be normal left ventricular function with a 70% proximal circumflex stenosis with thrombus. This lesion was noted to be on a bend and after surgical consult it was determined to treat her medically. She had a subsequent heart cath in October 1994. She was noted then to have serial 30-40% stenosis of the circumflex without significant disease.     Lexiscan was abnormal. She wanted to keep apt today to discuss prior to being scheduled for heart cath.      She otherwise denies chest pain, SOA, PND, orthopnea, syncope or near syncope. She has no other complaints.     Review of Systems    Constitutional: Negative for fever, chills, diaphoresis, activity change, appetite change, fatigue and unexpected weight change. Eyes: Negative for photophobia, pain, redness and visual disturbance. Respiratory: Negative for apnea, cough, chest tightness, shortness of breath, wheezing and stridor. Cardiovascular: Negative for chest pain, palpitations and leg swelling. + JOHNSON  Gastrointestinal: Negative for abdominal distention. Genitourinary: Negative for dysuria, urgency and frequency. Musculoskeletal: Negative for myalgias, arthralgias and gait problem. Skin: Negative for color change, pallor, rash and wound. Neurological: Negative for dizziness, tremors, speech difficulty, weakness and numbness. Hematological: Does not bruise/bleed easily. Psychiatric/Behavioral: Negative. rales or ronchi. Musculoskeletal: Normal range of motion. Gait is normal no assitive device. Neurological: She is alert and oriented to person, place, and time. Skin: Skin is warm and dry without rash or pallor. Psychiatric: She has a normal mood and affect. Her behavior is normal. Thought content normal.      No results found for: CREATININE, HGB, PROBNP     Lexiscan Nuclear Stress Test Report   Procedure date: 5/7/2019   Indications: Dyspnea and abnormal EKG   Procedure: Stress was performed with injection of 0.4 mg Lexiscan. Vital signs and EKG were monitored. Technetium-99 sestamibi was   injected in divided doses, approximately 10 mCi and 30 mCi   respectively for rest and stress imaging. The patient was discharged   in stable condition. Results: Patient had symptoms of dyspnea during infusion that resolved   in recovery. Baseline EKG showed sinus rhythm. During stress there   were no significant EKG changes or rhythm changes. Baseline and peak   blood pressures were 134/71, and 130/71 respectively.  Baseline and   peak heart rates were 62 and  78 respectively. Radioactive pharmaceuticals used:   Rest images 9.81 mCi technetium 99m sestamibi   Stress images 32.39 mCi technetium 99m sestamibi   Review of rest and stress images obtained in a SPECT acquisition   protocol along with review of the polar plot revealed:   1. Ejection fraction normal 76%   2. Wall motion study abnormal   3. Myocardial perfusion imaging study demonstrated reduced uptake in   the inferolateral segment with what appears to be significant   improvement on rest images. The sum stress score was 15 the sum   difference score was 8. Summary impressions:   Abnormal study normal ejection fraction suggestion of at least   moderate inferolateral ischemia correlate clinically   Signed by Dr Savanna Morris on 5/7/2019 3:46 PM              Assessment     1.  Coronary artery disease involving native coronary artery of native heart without angina pectoris    2. Abnormal nuclear stress test    3. Essential hypertension    4. Dyslipidemia             Plan:      I discussed with Ms. Corey in detail  the risks and benefits of cardiac catheterization. The risks mentioned to Ms. Godfrey include but are not limited to vascular complications in 3%, stroke <1%, renal dysfunction <5%, myocardial infarction <1%, coronary dissection <1%, need for emergency open heart surgery <1, death <1% . Ms. Godfrey verbalized understanding and agreed to proceed with this plan.     Risks, benefits, alternatives of cardiac catheterization/PCI discussed with the patient and full informed consent obtained.   Acceptable Mallampati score  Consent for moderate conscious sedation  ASA 3

## 2019-05-18 NOTE — PROGRESS NOTES
Patient discharge instructions given. Patient voiced understanding and voiced no questions. Patient ambulated out and discharged home.

## 2019-07-09 ENCOUNTER — TELEPHONE (OUTPATIENT)
Dept: CARDIOLOGY | Age: 59
End: 2019-07-09

## 2019-07-09 NOTE — TELEPHONE ENCOUNTER
Jori Maguire wished to r/s appt with Dr. Nita Bowser scheduled for 7/10/19. There is nothingavailable that [place] could r/s within the required timeframe. Could you please call the patient back with an appt. Thank you.

## 2019-09-24 ENCOUNTER — HOSPITAL ENCOUNTER (OUTPATIENT)
Dept: WOMENS IMAGING | Age: 59
Discharge: HOME | End: 2019-09-24
Payer: MEDICARE

## 2019-09-24 DIAGNOSIS — Z12.31 ENCOUNTER FOR SCREENING MAMMOGRAM FOR MALIGNANT NEOPLASM OF BREAST: ICD-10-CM

## 2019-09-24 PROCEDURE — 77067 SCR MAMMO BI INCL CAD: CPT

## 2019-09-24 RX ORDER — NITROGLYCERIN 40 MG/1
1 PATCH TRANSDERMAL DAILY
Qty: 30 PATCH | Refills: 3 | Status: SHIPPED | OUTPATIENT
Start: 2019-09-24 | End: 2019-10-07 | Stop reason: SDUPTHER

## 2019-10-07 DIAGNOSIS — I25.10 CAD (CORONARY ARTERY DISEASE): ICD-10-CM

## 2019-10-07 RX ORDER — NITROGLYCERIN 40 MG/1
1 PATCH TRANSDERMAL DAILY
Qty: 30 PATCH | Refills: 5 | Status: SHIPPED | OUTPATIENT
Start: 2019-10-07 | End: 2020-04-20

## 2019-10-07 RX ORDER — ATENOLOL 50 MG/1
TABLET ORAL
Qty: 30 TABLET | Refills: 5 | Status: SHIPPED | OUTPATIENT
Start: 2019-10-07 | End: 2020-03-30

## 2020-01-28 ENCOUNTER — OFFICE VISIT (OUTPATIENT)
Dept: CARDIOLOGY | Age: 60
End: 2020-01-28
Payer: MEDICARE

## 2020-01-28 VITALS
DIASTOLIC BLOOD PRESSURE: 80 MMHG | OXYGEN SATURATION: 96 % | SYSTOLIC BLOOD PRESSURE: 128 MMHG | HEART RATE: 82 BPM | WEIGHT: 216 LBS | BODY MASS INDEX: 42.41 KG/M2 | HEIGHT: 60 IN

## 2020-01-28 PROCEDURE — G8484 FLU IMMUNIZE NO ADMIN: HCPCS | Performed by: NURSE PRACTITIONER

## 2020-01-28 PROCEDURE — 4004F PT TOBACCO SCREEN RCVD TLK: CPT | Performed by: NURSE PRACTITIONER

## 2020-01-28 PROCEDURE — 3017F COLORECTAL CA SCREEN DOC REV: CPT | Performed by: NURSE PRACTITIONER

## 2020-01-28 PROCEDURE — G8427 DOCREV CUR MEDS BY ELIG CLIN: HCPCS | Performed by: NURSE PRACTITIONER

## 2020-01-28 PROCEDURE — 99213 OFFICE O/P EST LOW 20 MIN: CPT | Performed by: NURSE PRACTITIONER

## 2020-01-28 PROCEDURE — G8417 CALC BMI ABV UP PARAM F/U: HCPCS | Performed by: NURSE PRACTITIONER

## 2020-01-28 RX ORDER — GABAPENTIN 300 MG/1
300 CAPSULE ORAL 3 TIMES DAILY
COMMUNITY

## 2020-01-28 RX ORDER — METFORMIN HYDROCHLORIDE 750 MG/1
750 TABLET, EXTENDED RELEASE ORAL
COMMUNITY

## 2020-01-28 NOTE — PROGRESS NOTES
3,      ASSESSMENT:    ALL THE CARDIOLOGY PROBLEMS ARE LISTED ABOVE; HOWEVER, THE FOLLOWING SPECIFIC CARDIAC PROBLEMS / CONDITIONS WERE ADDRESSED AND TREATED DURING THE OFFICE VISIT TODAY:                                                                                            MEDICAL DECISION MAKING             Cardiac Specific Problem / Diagnosis  Discussion and Data Reviewed Diagnostic Procedures Ordered Management Options Selected           1. CAD  Stable   Review and summation of old records:    No chest pain. Patient is on beta-blocker, aspirin, statin. Nitro patch. No Continue current medications:     Yes           2. Hypertension  Stable   Blood pressure in the office today 128/80. O2 sat 96%. No Continue current medications:    Yes           3. Hyperlipidemia Stable  managed by primary care provider. Patient is on Lipitor 20 mg daily. Patient has appointment with PCP in 1 month for lab work. No Continue current medications: Yes                     No orders of the defined types were placed in this encounter. No orders of the defined types were placed in this encounter. Discussed with patient. Return in about 6 months (around 7/28/2020) for dr Christopher Goode. I greatly appreciate the opportunity to meet Myra Escoto and your confidence in allowing me to participate in her cardiovascular care. Santina Councilman, APRN - NP  1/28/2020 2:26 PM                    This dictation was generated by voice recognition computer software. Although all attempts are made to edit dictation for accuracy, there may be errors in the transcription that are not intended.

## 2020-03-30 RX ORDER — ATENOLOL 50 MG/1
TABLET ORAL
Qty: 30 TABLET | Refills: 5 | Status: SHIPPED | OUTPATIENT
Start: 2020-03-30 | End: 2020-08-18

## 2020-04-20 RX ORDER — NITROGLYCERIN 40 MG/1
PATCH TRANSDERMAL
Qty: 30 PATCH | Refills: 5 | Status: SHIPPED | OUTPATIENT
Start: 2020-04-20 | End: 2020-08-18

## 2020-08-18 RX ORDER — NITROGLYCERIN 40 MG/1
PATCH TRANSDERMAL
Qty: 30 PATCH | Refills: 5 | Status: SHIPPED | OUTPATIENT
Start: 2020-08-18 | End: 2021-02-23

## 2020-08-18 RX ORDER — ATENOLOL 50 MG/1
TABLET ORAL
Qty: 30 TABLET | Refills: 5 | Status: SHIPPED | OUTPATIENT
Start: 2020-08-18 | End: 2021-02-23

## 2021-02-23 DIAGNOSIS — I25.10 CAD (CORONARY ARTERY DISEASE): ICD-10-CM

## 2021-02-23 RX ORDER — ATENOLOL 50 MG/1
TABLET ORAL
Qty: 90 TABLET | Refills: 0 | Status: SHIPPED | OUTPATIENT
Start: 2021-02-23 | End: 2021-05-17

## 2021-02-23 RX ORDER — NITROGLYCERIN 40 MG/1
PATCH TRANSDERMAL
Qty: 90 PATCH | Refills: 0 | Status: SHIPPED | OUTPATIENT
Start: 2021-02-23 | End: 2021-05-17

## 2021-03-09 ENCOUNTER — OFFICE VISIT (OUTPATIENT)
Dept: CARDIOLOGY CLINIC | Age: 61
End: 2021-03-09
Payer: MEDICARE

## 2021-03-09 VITALS
HEIGHT: 60 IN | SYSTOLIC BLOOD PRESSURE: 112 MMHG | BODY MASS INDEX: 41.03 KG/M2 | WEIGHT: 209 LBS | HEART RATE: 65 BPM | DIASTOLIC BLOOD PRESSURE: 76 MMHG

## 2021-03-09 DIAGNOSIS — E78.2 MIXED HYPERLIPIDEMIA: ICD-10-CM

## 2021-03-09 DIAGNOSIS — F17.210 CIGARETTE NICOTINE DEPENDENCE WITHOUT COMPLICATION: ICD-10-CM

## 2021-03-09 DIAGNOSIS — I10 ESSENTIAL HYPERTENSION: ICD-10-CM

## 2021-03-09 DIAGNOSIS — I25.10 CORONARY ARTERY DISEASE INVOLVING NATIVE CORONARY ARTERY OF NATIVE HEART WITHOUT ANGINA PECTORIS: Primary | ICD-10-CM

## 2021-03-09 PROCEDURE — 99213 OFFICE O/P EST LOW 20 MIN: CPT | Performed by: CLINICAL NURSE SPECIALIST

## 2021-03-09 PROCEDURE — 93000 ELECTROCARDIOGRAM COMPLETE: CPT | Performed by: CLINICAL NURSE SPECIALIST

## 2021-03-09 PROCEDURE — G8417 CALC BMI ABV UP PARAM F/U: HCPCS | Performed by: CLINICAL NURSE SPECIALIST

## 2021-03-09 PROCEDURE — G8484 FLU IMMUNIZE NO ADMIN: HCPCS | Performed by: CLINICAL NURSE SPECIALIST

## 2021-03-09 PROCEDURE — G8427 DOCREV CUR MEDS BY ELIG CLIN: HCPCS | Performed by: CLINICAL NURSE SPECIALIST

## 2021-03-09 PROCEDURE — 4004F PT TOBACCO SCREEN RCVD TLK: CPT | Performed by: CLINICAL NURSE SPECIALIST

## 2021-03-09 PROCEDURE — 99406 BEHAV CHNG SMOKING 3-10 MIN: CPT | Performed by: CLINICAL NURSE SPECIALIST

## 2021-03-09 PROCEDURE — 3017F COLORECTAL CA SCREEN DOC REV: CPT | Performed by: CLINICAL NURSE SPECIALIST

## 2021-03-09 RX ORDER — NICOTINE 21 MG/24HR
1 PATCH, TRANSDERMAL 24 HOURS TRANSDERMAL DAILY
Qty: 30 PATCH | Refills: 0 | Status: SHIPPED | OUTPATIENT
Start: 2021-03-09 | End: 2021-04-20

## 2021-03-09 ASSESSMENT — ENCOUNTER SYMPTOMS
FACIAL SWELLING: 0
WHEEZING: 0
ABDOMINAL PAIN: 0
COUGH: 0
SHORTNESS OF BREATH: 1
CHEST TIGHTNESS: 0
VOMITING: 0
EYE REDNESS: 0
NAUSEA: 0

## 2021-03-09 NOTE — PATIENT INSTRUCTIONS
Return in about 9 months (around 12/9/2021) for APRN. Quit smoking. Call the [address] [place] [address] 1-800-QUIT-NOW  Nicotine patches sent to pharmacy    Patient Education        Learning About Benefits From Quitting Smoking  How does quitting smoking make you healthier? If you're thinking about quitting smoking, you may have a few reasons to be smoke-free. Your health may be one of them. · When you quit smoking, you lower your risks for cancer, lung disease, heart attack, stroke, blood vessel disease, and blindness from macular degeneration. · When you're smoke-free, you get sick less often, and you heal faster. You are less likely to get colds, flu, bronchitis, and pneumonia. · As a nonsmoker, you may find that your mood is better and you are less stressed. When and how will you feel healthier? Quitting has real health benefits that start from day 1 of being smoke-free. And the longer you stay smoke-free, the healthier you get and the better you feel. The first hours  · After just 20 minutes, your blood pressure and heart rate go down. That means there's less stress on your heart and blood vessels. · Within 12 hours, the level of carbon monoxide in your blood drops back to normal. That makes room for more oxygen. With more oxygen in your body, you may notice that you have more energy than when you smoked. After 2 weeks  · Your lungs start to work better. · Your risk of heart attack starts to drop. After 1 month  · When your lungs are clear, you cough less and breathe deeper, so it's easier to be active. · Your sense of taste and smell return. That means you can enjoy food more than you have since you started smoking. Over the years  · Over the years, your risks of heart disease, heart attack, and stroke are lower. · After 10 years, your risk of dying from lung cancer is cut by about half. And your risk for many other types of cancer is lower too. How would quitting help others in your life?   When you quit smoking, you improve the health of everyone who now breathes in your smoke. · Their heart, lung, and cancer risks drop, much like yours. · They are sick less. For babies and small children, living smoke-free means they're less likely to have ear infections, pneumonia, and bronchitis. · If you're a woman who is or will be pregnant someday, quitting smoking means a healthier . · Children who are close to you are less likely to become adult smokers. Where can you learn more? Go to https://EventuppeWututu.Barnacle. org and sign in to your [employer] account. Enter [phone] in the [facility] box to learn more about \"Learning About Benefits From Quitting Smoking. \"     If you do not have an account, please click on the \"Sign Up Now\" link. Current as of: 2020               Content Version: 12.6  © 5363-2679 [employer]. Care instructions adapted under license by [facility] [place] ([facility]). If you have questions about a medical condition or this instruction, always ask your healthcare professional. [address] any warranty or liability for your use of this information.

## 2021-03-09 NOTE — PROGRESS NOTES
[facility], [address] [facility] [address] [facility] [address], [id number]  Phone: [phone]  Fax: [phone]    OFFICE VISIT:  3/9/2021    Lakeshia Godfrey - : 1960    Reason For Visit:  Stephanie Peres is a 57 y.o. female who is here for Follow-up (No cardiac sx today to discuss) and Coronary Artery Disease       Diagnosis Orders   1. Coronary artery disease involving native coronary artery of native heart without angina pectoris     2. Essential hypertension     3. Mixed hyperlipidemia     4. Cigarette nicotine dependence without complication         HPI  Patient is here for follow-up with a history of CAD, hypertension, hyperlipidemia. She is a patient of Dr. Renaye Carrel. History includes acute infarction in 1993 with sustained V. fib arrest.  Heart cath at that time showed normal left ventricular function with a 70% proximal circumflex stenosis with thrombosis. The lesion was noted to be ulcerated and after surgical consult was determined to treat her medically. She has had a subsequent heart cath in 1994 she was noted to have 30 to 40% stenosis of the circumflex without significant disease. Lexiscan 2019 abnormal study. Cardiac catheterization 2019 showed:    Intermediate grade non-obstructive proximal circumflex and proximal RCA ulcerated lesion.   Occluded distal circumflex collateralized from right system.  Normal LV ejection fraction. Patient denies chest pain, unusual dyspnea, orthopnea, PND, edema, palpitations. She continues to smoke     Schuyler Meek MD is PCP.   Shabnam Verdugo has the following history as recorded in [facility]:    Patient Active Problem List    Diagnosis Date Noted    Cigarette nicotine dependence without complication     Essential hypertension 2016    CAD (coronary artery disease)     Hyperlipemia     History of myocardial infarction     Ventricular fibrillation (HCC)     Fluid retention      Past Medical History:   Diagnosis Date    Arthritis     Asthma     CAD (coronary artery disease)     Chest pain     Cholelithiasis     Cigarette nicotine dependence without complication     COPD (chronic obstructive pulmonary disease) (HCC)     Fluid retention     History of myocardial infarction     History of tobacco abuse     Hyperlipemia     Hypertension     Tobacco abuse 2017    Ventricular fibrillation [facility])      Past Surgical History:   Procedure Laterality Date    CARDIAC CATHETERIZATION  10/24/1994    EF in excess of 50%.  CARDIAC CATHETERIZATION  1993    Emergent - [facility] - EF in excess of 50%. [address] and      SECTION       Family History   Problem Relation Age of Onset    High Blood Pressure Mother     High Cholesterol Mother     Obesity Mother     Heart Disease Father     High Blood Pressure Father     High Cholesterol Father      Social History     Tobacco Use    Smoking status: Current Every Day Smoker     Packs/day: 0.50     Types: Cigarettes    Smokeless tobacco: Never Used   Substance Use Topics    Alcohol use: Never     Frequency: Never      Current Outpatient Medications   Medication Sig Dispense Refill    nicotine (NICODERM CQ) 21 MG/24HR Place 1 patch onto the skin daily 30 patch 0    atenolol (TENORMIN) 50 MG tablet TAKE 1 TABLET BY MOUTH EVERY DAY 90 tablet 0    nitroGLYCERIN (NITRODUR) 0.2 MG/HR APPLY 1 PATCH ONTO THE SKIN EVERY DAY 90 patch 0    gabapentin (NEURONTIN) 300 MG capsule Take 300 mg by mouth 3 times daily.       metFORMIN (GLUCOPHAGE-XR) 750 MG extended release tablet Take 750 mg by mouth daily (with breakfast)      omeprazole (PRILOSEC) 20 MG delayed release capsule Take 20 mg by mouth Daily       cyclobenzaprine (FLEXERIL) 10 MG tablet Take 10 mg by mouth 2 times daily as needed       meloxicam (MOBIC) 15 MG tablet Take 15 mg by mouth daily       atorvastatin (LIPITOR) 20 MG tablet Take 20 mg by mouth daily       citalopram (CELEXA) 20 MG tablet   Take 20 mg by mouth daily       aspirin 325 MG tablet Take 325 mg by mouth daily.  furosemide (LASIX) 20 MG tablet Take 20 mg by mouth daily        No current facility-administered medications for this visit. Allergies: Patient has no known allergies. Review of Systems  Review of Systems   Constitutional: Negative for activity change, diaphoresis, fatigue, fever and unexpected weight change. HENT: Negative for facial swelling and nosebleeds. Eyes: Negative for redness and visual disturbance. Respiratory: Positive for shortness of breath. Negative for cough, chest tightness and wheezing. Cardiovascular: Negative for chest pain, palpitations and leg swelling. Gastrointestinal: Negative for abdominal pain, nausea and vomiting. Endocrine: Negative for cold intolerance and heat intolerance. Genitourinary: Negative for dysuria and hematuria. Musculoskeletal: Negative for arthralgias and myalgias. Skin: Negative for pallor and rash. Neurological: Negative for dizziness, seizures, syncope, weakness and light-headedness. Hematological: Does not bruise/bleed easily. Psychiatric/Behavioral: Negative for agitation. The patient is not nervous/anxious. Objective  Vital Signs - /76   Pulse 65   Ht 5' (1.524 m)   Wt 209 lb (94.8 kg)   BMI 40.82 kg/m²    Wt Readings from Last 3 Encounters:   03/09/21 209 lb (94.8 kg)   01/28/20 216 lb (98 kg)   05/17/19 214 lb (97.1 kg)      Physical Exam    Data:  Heart Cath 5/17/19  Conclusions    Intermediate grade non-obstructive proximal circumflex and proximal RCA   ulcerated lesion. Occluded distal circumflex collateralized from right system. Normal LV ejection fraction    EKG shows normal sinus rhythm rate 65  Assessment:     Diagnosis Orders   1. Coronary artery disease involving native coronary artery of native heart without angina pectoris     2.  Essential hypertension 3. Mixed hyperlipidemia     4. Cigarette nicotine dependence without complication         CADstable without symptoms of angina    Hypertensioncontrolled on current regimen    Hyperlipidemiastable. Last LDL 79 under the media tab, close to goal.  Encourage dietary changes and exercise    Diabaetes- stable with A1C 6.5 per pt report    Nicotine dependenceInstructed patient in smoking cessation rationales, strategies, and available resources x 3 minutes. smokes 1ppd. Chantix and patches denied by insurance in the past.  Will try again since it is a new calendar year. Long discussion about the cost of smoking versus the cost of buying nicotine patches. She verbalized understanding    Stable cardiovascular status. No evidence of overt heart failure,angina or dysrhythmia. Plan    Orders Placed This Encounter   Procedures    EKG 12 lead     Order Specific Question:   Reason for Exam?     Answer:   Irregular heart rate     Return in about 9 months (around 12/9/2021) for APRN. Quit smoking. Call the [address] [place] [address] [phone]  Nicotine patches sent to pharmacy  Send today's note to Dr. Bernadette Olivares    Call with any questionsor concerns  Follow up with Elvi Cadet MD for non cardiac problems  Report any new problems  Cardiovascular Fitness-Exercise as tolerated. Strive for 15 minutes of exercise most days of the week. Cardiac / HealthyDiet  Continue current medications as directed  Continue plan of treatment  It is always recommended that you bring your medicationsbottles with you to each visit - this is for your safety!        MANOLO Bermeo

## 2021-04-13 ENCOUNTER — HOSPITAL ENCOUNTER (INPATIENT)
Facility: HOSPITAL | Age: 61
LOS: 1 days | Discharge: HOME | End: 2021-04-14
Attending: EMERGENCY MEDICINE | Admitting: FAMILY MEDICINE

## 2021-04-13 ENCOUNTER — APPOINTMENT (OUTPATIENT)
Dept: CT IMAGING | Facility: HOSPITAL | Age: 61
End: 2021-04-13

## 2021-04-13 ENCOUNTER — APPOINTMENT (OUTPATIENT)
Dept: GENERAL RADIOLOGY | Facility: HOSPITAL | Age: 61
End: 2021-04-13

## 2021-04-13 DIAGNOSIS — I63.81 ACUTE LACUNAR STROKE (HCC): ICD-10-CM

## 2021-04-13 DIAGNOSIS — G45.9 TIA (TRANSIENT ISCHEMIC ATTACK): Primary | ICD-10-CM

## 2021-04-13 DIAGNOSIS — R13.10 DYSPHAGIA, UNSPECIFIED TYPE: ICD-10-CM

## 2021-04-13 PROBLEM — Z86.73 HISTORY OF STROKE: Status: ACTIVE | Noted: 2021-04-13

## 2021-04-13 PROBLEM — E78.5 HYPERLIPIDEMIA: Status: ACTIVE | Noted: 2021-04-13

## 2021-04-13 PROBLEM — I25.10 CORONARY ARTERY DISEASE INVOLVING NATIVE CORONARY ARTERY OF NATIVE HEART: Status: ACTIVE | Noted: 2021-04-13

## 2021-04-13 PROBLEM — I10 ESSENTIAL HYPERTENSION: Status: ACTIVE | Noted: 2021-04-13

## 2021-04-13 LAB
ABO GROUP BLD: NORMAL
ALBUMIN SERPL-MCNC: 4 G/DL (ref 3.5–5.2)
ALBUMIN/GLOB SERPL: 1.5 G/DL
ALP SERPL-CCNC: 176 U/L (ref 39–117)
ALT SERPL W P-5'-P-CCNC: 22 U/L (ref 1–33)
AMPHET+METHAMPHET UR QL: NEGATIVE
AMPHETAMINES UR QL: NEGATIVE
ANION GAP SERPL CALCULATED.3IONS-SCNC: 9 MMOL/L (ref 5–15)
AST SERPL-CCNC: 16 U/L (ref 1–32)
BARBITURATES UR QL SCN: NEGATIVE
BASOPHILS # BLD AUTO: 0.04 10*3/MM3 (ref 0–0.2)
BASOPHILS NFR BLD AUTO: 0.6 % (ref 0–1.5)
BENZODIAZ UR QL SCN: NEGATIVE
BILIRUB SERPL-MCNC: 0.2 MG/DL (ref 0–1.2)
BILIRUB UR QL STRIP: NEGATIVE
BLD GP AB SCN SERPL QL: NEGATIVE
BUN SERPL-MCNC: 17 MG/DL (ref 8–23)
BUN/CREAT SERPL: 25 (ref 7–25)
BUPRENORPHINE SERPL-MCNC: NEGATIVE NG/ML
CALCIUM SPEC-SCNC: 8.9 MG/DL (ref 8.6–10.5)
CANNABINOIDS SERPL QL: POSITIVE
CHLORIDE SERPL-SCNC: 105 MMOL/L (ref 98–107)
CLARITY UR: CLEAR
CO2 SERPL-SCNC: 30 MMOL/L (ref 22–29)
COCAINE UR QL: NEGATIVE
COLOR UR: YELLOW
CREAT SERPL-MCNC: 0.68 MG/DL (ref 0.57–1)
DEPRECATED RDW RBC AUTO: 45.6 FL (ref 37–54)
EOSINOPHIL # BLD AUTO: 0.01 10*3/MM3 (ref 0–0.4)
EOSINOPHIL NFR BLD AUTO: 0.1 % (ref 0.3–6.2)
ERYTHROCYTE [DISTWIDTH] IN BLOOD BY AUTOMATED COUNT: 15.2 % (ref 12.3–15.4)
ETHANOL UR QL: <0.01 %
GFR SERPL CREATININE-BSD FRML MDRD: 106 ML/MIN/1.73
GFR SERPL CREATININE-BSD FRML MDRD: 88 ML/MIN/1.73
GLOBULIN UR ELPH-MCNC: 2.7 GM/DL
GLUCOSE BLDC GLUCOMTR-MCNC: 95 MG/DL (ref 70–130)
GLUCOSE SERPL-MCNC: 102 MG/DL (ref 65–99)
GLUCOSE UR STRIP-MCNC: NEGATIVE MG/DL
HCT VFR BLD AUTO: 41.7 % (ref 34–46.6)
HGB BLD-MCNC: 13.2 G/DL (ref 12–15.9)
HGB UR QL STRIP.AUTO: NEGATIVE
HOLD SPECIMEN: NORMAL
HOLD SPECIMEN: NORMAL
IMM GRANULOCYTES # BLD AUTO: 0.02 10*3/MM3 (ref 0–0.05)
IMM GRANULOCYTES NFR BLD AUTO: 0.3 % (ref 0–0.5)
INR PPP: 1.01 (ref 0.91–1.09)
KETONES UR QL STRIP: NEGATIVE
LEUKOCYTE ESTERASE UR QL STRIP.AUTO: NEGATIVE
LYMPHOCYTES # BLD AUTO: 2.27 10*3/MM3 (ref 0.7–3.1)
LYMPHOCYTES NFR BLD AUTO: 32.7 % (ref 19.6–45.3)
MCH RBC QN AUTO: 26.3 PG (ref 26.6–33)
MCHC RBC AUTO-ENTMCNC: 31.7 G/DL (ref 31.5–35.7)
MCV RBC AUTO: 83.2 FL (ref 79–97)
METHADONE UR QL SCN: NEGATIVE
MONOCYTES # BLD AUTO: 0.46 10*3/MM3 (ref 0.1–0.9)
MONOCYTES NFR BLD AUTO: 6.6 % (ref 5–12)
NEUTROPHILS NFR BLD AUTO: 4.15 10*3/MM3 (ref 1.7–7)
NEUTROPHILS NFR BLD AUTO: 59.7 % (ref 42.7–76)
NITRITE UR QL STRIP: NEGATIVE
NRBC BLD AUTO-RTO: 0 /100 WBC (ref 0–0.2)
OPIATES UR QL: NEGATIVE
OXYCODONE UR QL SCN: NEGATIVE
PCP UR QL SCN: NEGATIVE
PH UR STRIP.AUTO: 6 [PH] (ref 5–8)
PLATELET # BLD AUTO: 280 10*3/MM3 (ref 140–450)
PMV BLD AUTO: 9.3 FL (ref 6–12)
POTASSIUM SERPL-SCNC: 4.5 MMOL/L (ref 3.5–5.2)
PROPOXYPH UR QL: NEGATIVE
PROT SERPL-MCNC: 6.7 G/DL (ref 6–8.5)
PROT UR QL STRIP: NEGATIVE
PROTHROMBIN TIME: 12.9 SECONDS (ref 11.9–14.6)
RBC # BLD AUTO: 5.01 10*6/MM3 (ref 3.77–5.28)
RH BLD: POSITIVE
SARS-COV-2 RDRP RESP QL NAA+PROBE: NORMAL
SODIUM SERPL-SCNC: 144 MMOL/L (ref 136–145)
SP GR UR STRIP: >1.03 (ref 1–1.03)
T&S EXPIRATION DATE: NORMAL
TRICYCLICS UR QL SCN: POSITIVE
TSH SERPL DL<=0.05 MIU/L-ACNC: 0.81 UIU/ML (ref 0.27–4.2)
UROBILINOGEN UR QL STRIP: ABNORMAL
WBC # BLD AUTO: 6.95 10*3/MM3 (ref 3.4–10.8)
WHOLE BLOOD HOLD SPECIMEN: NORMAL
WHOLE BLOOD HOLD SPECIMEN: NORMAL

## 2021-04-13 PROCEDURE — 93005 ELECTROCARDIOGRAM TRACING: CPT | Performed by: EMERGENCY MEDICINE

## 2021-04-13 PROCEDURE — 99285 EMERGENCY DEPT VISIT HI MDM: CPT

## 2021-04-13 PROCEDURE — G0378 HOSPITAL OBSERVATION PER HR: HCPCS

## 2021-04-13 PROCEDURE — 70450 CT HEAD/BRAIN W/O DYE: CPT

## 2021-04-13 PROCEDURE — 84443 ASSAY THYROID STIM HORMONE: CPT | Performed by: PSYCHIATRY & NEUROLOGY

## 2021-04-13 PROCEDURE — 82077 ASSAY SPEC XCP UR&BREATH IA: CPT | Performed by: EMERGENCY MEDICINE

## 2021-04-13 PROCEDURE — 82962 GLUCOSE BLOOD TEST: CPT

## 2021-04-13 PROCEDURE — 0042T HC CT CEREBRAL PERFUSION W/WO CONTRAST: CPT

## 2021-04-13 PROCEDURE — 87635 SARS-COV-2 COVID-19 AMP PRB: CPT | Performed by: EMERGENCY MEDICINE

## 2021-04-13 PROCEDURE — 81003 URINALYSIS AUTO W/O SCOPE: CPT | Performed by: PSYCHIATRY & NEUROLOGY

## 2021-04-13 PROCEDURE — 85610 PROTHROMBIN TIME: CPT | Performed by: EMERGENCY MEDICINE

## 2021-04-13 PROCEDURE — 80053 COMPREHEN METABOLIC PANEL: CPT | Performed by: EMERGENCY MEDICINE

## 2021-04-13 PROCEDURE — 0 IOPAMIDOL PER 1 ML: Performed by: EMERGENCY MEDICINE

## 2021-04-13 PROCEDURE — 71045 X-RAY EXAM CHEST 1 VIEW: CPT

## 2021-04-13 PROCEDURE — 70498 CT ANGIOGRAPHY NECK: CPT

## 2021-04-13 PROCEDURE — 86901 BLOOD TYPING SEROLOGIC RH(D): CPT | Performed by: EMERGENCY MEDICINE

## 2021-04-13 PROCEDURE — 99223 1ST HOSP IP/OBS HIGH 75: CPT | Performed by: PSYCHIATRY & NEUROLOGY

## 2021-04-13 PROCEDURE — 85025 COMPLETE CBC W/AUTO DIFF WBC: CPT | Performed by: EMERGENCY MEDICINE

## 2021-04-13 PROCEDURE — 93010 ELECTROCARDIOGRAM REPORT: CPT | Performed by: INTERNAL MEDICINE

## 2021-04-13 PROCEDURE — 70496 CT ANGIOGRAPHY HEAD: CPT

## 2021-04-13 PROCEDURE — 86900 BLOOD TYPING SEROLOGIC ABO: CPT | Performed by: EMERGENCY MEDICINE

## 2021-04-13 PROCEDURE — 86850 RBC ANTIBODY SCREEN: CPT | Performed by: EMERGENCY MEDICINE

## 2021-04-13 PROCEDURE — 80306 DRUG TEST PRSMV INSTRMNT: CPT | Performed by: PSYCHIATRY & NEUROLOGY

## 2021-04-13 RX ORDER — ATENOLOL 50 MG/1
50 TABLET ORAL DAILY
Status: DISCONTINUED | OUTPATIENT
Start: 2021-04-14 | End: 2021-04-14 | Stop reason: HOSPADM

## 2021-04-13 RX ORDER — GABAPENTIN 300 MG/1
300 CAPSULE ORAL 3 TIMES DAILY
COMMUNITY

## 2021-04-13 RX ORDER — HYDROCODONE BITARTRATE AND ACETAMINOPHEN 5; 325 MG/1; MG/1
1 TABLET ORAL 2 TIMES DAILY
COMMUNITY
End: 2021-04-13

## 2021-04-13 RX ORDER — ATORVASTATIN CALCIUM 40 MG/1
80 TABLET, FILM COATED ORAL NIGHTLY
Status: DISCONTINUED | OUTPATIENT
Start: 2021-04-13 | End: 2021-04-14 | Stop reason: HOSPADM

## 2021-04-13 RX ORDER — ASPIRIN 325 MG
325 TABLET ORAL DAILY
Status: DISCONTINUED | OUTPATIENT
Start: 2021-04-14 | End: 2021-04-14

## 2021-04-13 RX ORDER — OMEPRAZOLE 20 MG/1
20 CAPSULE, DELAYED RELEASE ORAL DAILY
COMMUNITY

## 2021-04-13 RX ORDER — ATORVASTATIN CALCIUM 20 MG/1
20 TABLET, FILM COATED ORAL NIGHTLY
COMMUNITY
End: 2021-04-14 | Stop reason: HOSPADM

## 2021-04-13 RX ORDER — MELOXICAM 7.5 MG/1
7.5 TABLET ORAL DAILY
COMMUNITY
End: 2021-04-14 | Stop reason: HOSPADM

## 2021-04-13 RX ORDER — ALBUTEROL SULFATE 90 UG/1
2 AEROSOL, METERED RESPIRATORY (INHALATION) EVERY 4 HOURS PRN
COMMUNITY

## 2021-04-13 RX ORDER — CITALOPRAM 10 MG/1
10 TABLET ORAL DAILY
COMMUNITY

## 2021-04-13 RX ORDER — MELOXICAM 7.5 MG/1
7.5 TABLET ORAL DAILY
Status: DISCONTINUED | OUTPATIENT
Start: 2021-04-14 | End: 2021-04-14 | Stop reason: HOSPADM

## 2021-04-13 RX ORDER — ACETAMINOPHEN 325 MG/1
650 TABLET ORAL EVERY 4 HOURS PRN
Status: DISCONTINUED | OUTPATIENT
Start: 2021-04-13 | End: 2021-04-14 | Stop reason: HOSPADM

## 2021-04-13 RX ORDER — ONDANSETRON 4 MG/1
4 TABLET, FILM COATED ORAL EVERY 6 HOURS PRN
Status: DISCONTINUED | OUTPATIENT
Start: 2021-04-13 | End: 2021-04-14 | Stop reason: HOSPADM

## 2021-04-13 RX ORDER — ONDANSETRON 2 MG/ML
4 INJECTION INTRAMUSCULAR; INTRAVENOUS EVERY 6 HOURS PRN
Status: DISCONTINUED | OUTPATIENT
Start: 2021-04-13 | End: 2021-04-14 | Stop reason: HOSPADM

## 2021-04-13 RX ORDER — GABAPENTIN 100 MG/1
300 CAPSULE ORAL 3 TIMES DAILY
Status: DISCONTINUED | OUTPATIENT
Start: 2021-04-13 | End: 2021-04-14 | Stop reason: HOSPADM

## 2021-04-13 RX ORDER — SODIUM CHLORIDE 0.9 % (FLUSH) 0.9 %
10 SYRINGE (ML) INJECTION AS NEEDED
Status: DISCONTINUED | OUTPATIENT
Start: 2021-04-13 | End: 2021-04-14 | Stop reason: HOSPADM

## 2021-04-13 RX ORDER — SODIUM CHLORIDE 0.9 % (FLUSH) 0.9 %
10 SYRINGE (ML) INJECTION EVERY 12 HOURS SCHEDULED
Status: DISCONTINUED | OUTPATIENT
Start: 2021-04-13 | End: 2021-04-14 | Stop reason: HOSPADM

## 2021-04-13 RX ORDER — NITROGLYCERIN 120 MG/1
1 PATCH TRANSDERMAL DAILY
COMMUNITY
End: 2021-10-29 | Stop reason: DRUGHIGH

## 2021-04-13 RX ORDER — ATENOLOL 50 MG/1
50 TABLET ORAL DAILY
COMMUNITY
End: 2022-05-19 | Stop reason: SDUPTHER

## 2021-04-13 RX ORDER — CITALOPRAM 10 MG/1
10 TABLET ORAL DAILY
Status: DISCONTINUED | OUTPATIENT
Start: 2021-04-14 | End: 2021-04-14 | Stop reason: HOSPADM

## 2021-04-13 RX ORDER — ACETAMINOPHEN 160 MG/5ML
650 SOLUTION ORAL EVERY 4 HOURS PRN
Status: DISCONTINUED | OUTPATIENT
Start: 2021-04-13 | End: 2021-04-14 | Stop reason: HOSPADM

## 2021-04-13 RX ORDER — IPRATROPIUM BROMIDE AND ALBUTEROL SULFATE 2.5; .5 MG/3ML; MG/3ML
3 SOLUTION RESPIRATORY (INHALATION)
Status: DISCONTINUED | OUTPATIENT
Start: 2021-04-13 | End: 2021-04-14 | Stop reason: HOSPADM

## 2021-04-13 RX ORDER — ASPIRIN 325 MG
325 TABLET ORAL DAILY
COMMUNITY
End: 2021-04-14 | Stop reason: HOSPADM

## 2021-04-13 RX ORDER — CYCLOBENZAPRINE HCL 10 MG
10 TABLET ORAL 2 TIMES DAILY PRN
COMMUNITY

## 2021-04-13 RX ADMIN — SODIUM CHLORIDE, PRESERVATIVE FREE 10 ML: 5 INJECTION INTRAVENOUS at 23:42

## 2021-04-13 RX ADMIN — IOPAMIDOL 80 ML: 755 INJECTION, SOLUTION INTRAVENOUS at 18:38

## 2021-04-13 NOTE — CONSULTS
Neurology Consult Note    Patient:  Loren Doll   YOB: 1960  MRN:  [id number]  Date of Admission:  4/13/2021  6:15 PM    Date: 4/13/2021    Referring Provider: Seth Stoner MD  Reason for Consultation: Code stroke      History of present illness:     This is a 61 y.o. right handed female with a H/O previous stroke, CAD, mixed  hyperlipidemia, hypertension, smoker, PVD, Ventricular fibrillation.who was last known well at 5 PM today.  She had some slurred speech and EMTs thought they saw a right facial droop which was not apparent on her arrival to our ER  Dr Stoner called a code stroke as the dysarthria persisted.    Patient states that she took CBD candy this morning around 11 AM.  She threw up and was not feeling well and then took a nap around 2 PM stating she felt dizzy.  She got up around 4 PM and daughter saw her and thought she was normal then.  She went to a BB and was not there long before she began to walk funny leaning over to the right with head tilted and turned to the left  Daughter thinks this as 6:15 but EMTs state 5 PM and it is currently 7:30 PM Patient reports she felt dizzy--lightheaded and drunk. Daughter noted she was not talking right and had a right facial droop. EMTs noted a right facial droop.  Nursing here noted left arm drift but that patient could not talk right.  She would read a sentence given to her and miss many of the words. She tested with decreased sensation on the right.  However when Dr Stoner saw her she only had dysarthria.     Head CT was unremarkable for acute changes but revealed old BG stroke on the right.     Past Medical History:   Diagnosis Date   • Stroke (CMS/HCC)       Per Cardiology note from [facility]   • CAD (coronary artery disease)   • Chest pain   • Cholelithiasis   • Cigarette nicotine dependence without complication 11/30/2017   • Fluid retention   • History of myocardial infarction   • History of tobacco abuse   • Hyperlipemia    • Tobacco abuse 5/31/2017   • Ventricular fibrillation (HCC)     History reviewed. No pertinent surgical history.  • CARDIAC CATHETERIZATION 10/24/1994   EF in excess of 50%.   • CARDIAC CATHETERIZATION 07/14/1993   Emergent - [facility] - EF in excess of 50%    Prior to Admission medications    Not on File   Home medications include the following   · Aspirin 325 mg daily  · Atenolol 50 mg daily  · Citalopram 10 mg daily  · Cyclobenzaprine 10 mg   2 times a day as needed  · Hydrocodone 5/325 mg twice a day  · Meloxicam 7.5 mg daily  · Nitroglycerin 0.6 mg/h patch  · Prilosec 20 mg daily  · Fluticasone furoate    Hospital scheduled medications:      Hospital PRN medications:  •  sodium chloride    No Known Allergies    Social History     Socioeconomic History   • Marital status:      Spouse name: Not on file   • Number of children: Not on file   • Years of education: Not on file   • Highest education level: Not on file   Tobacco Use   • Smoking status: Current Every Day Smoker     Packs/day: 1.50     Types: Cigarettes   Substance and Sexual Activity   • Alcohol use: Not Currently   • Drug use: Yes     History reviewed. No pertinent family history.    Review of Systems  A 14 point review of systems was reviewed and was negative except for her transient symptoms of right facial droop and dysarthria with ? Of weakness on the left    Vital Signs   Temp:  [97.6 °F (36.4 °C)] 97.6 °F (36.4 °C)  Heart Rate:  [78-82] 78  Resp:  [20] 20  BP: (120-129)/(59-61) 120/61    General Exam:  Head:  Normal cephalic, atraumatic  HEENT:  Neck supple  Fundoscopic Exam:  No signs of disc edema  CVS:  Regular rate and rhythm.  No murmurs  Carotid Examination:  No bruits  Lungs:  Clear to auscultation  Abdomen:  Non-tender, Non-distended  Extremities:  No signs of peripheral edema  Skin:  No rashes    Neurologic Exam:    Mental Status:    -Awake, Alert, Oriented X 3.  She could read all the sentences name of the items and describe in  detail what was going on in the picture with the woman doing dishes  -No word finding difficulties  -No aphasia  -No dysarthria  -Follows simple and complex commands    CN II:  Visual fields full.  No visual extinction pupils equally reactive to light  CN III, IV, VI:  Extraocular Muscles full with no signs of nystagmus  CN V:  Facial sensory is symmetric   CN VII:  Facial motor symmetric  CN VIII:  Gross hearing intact bilaterally  CN IX/X:  Palate elevates symmetrically  CN XI:  Shoulder shrug symmetric  CN XII:  Tongue is midline on protrusion    Motor: (strength out of 5:  1= minimal movement, 2 = movement in plane of gravity, 3 = movement against gravity, 4 = movement against some resistance, 5 = full strength)    -Right Upper Ext: Proximal: 5 Distal: 5  -Left Upper Ext: Proximal: 5 Distal: 5    -Right Lower Ext: Proximal: 5 Distal: 5  -Left Lower Ext: Proximal: 5 Distal: 5    DTR:  -Right   Bicep: 2+ Triceps: 2+ Brachioradialis: 2+   Patella: 2+ Ankle: 2+ Neg Babinski  -Left   Bicep: 2+ Triceps: 2+ Brachioradialis: 2+   Patella: 2+ Ankle: 2+ Neg Babinski    Sensory:  -Intact to light touch, pinprick.  No tactile extinction    Coordination:  -Finger to nose intact  -Heel to shin intact  -No ataxia    Gait  -Not tested during a code stroke      Results Review:    Lab Results (last 7 days)     Procedure Component Value Units Date/Time    Ethanol [[id number]] Collected: 04/13/21 1832    Specimen: Blood Updated: 04/13/21 1916     Ethanol % <0.010 %     Narrative:      Not for legal purposes. Chain of Custody not followed.     TSH [[id number]]  (Normal) Collected: 04/13/21 1832    Specimen: Blood Updated: 04/13/21 1914     TSH 0.813 uIU/mL     Comprehensive Metabolic Panel [[id number]]  (Abnormal) Collected: 04/13/21 1832    Specimen: Blood Updated: 04/13/21 1909     Glucose 102 mg/dL      BUN 17 mg/dL      Creatinine 0.68 mg/dL      Sodium 144 mmol/L      Potassium 4.5 mmol/L      Chloride 105 mmol/L      CO2 30.0  mmol/L      Calcium 8.9 mg/dL      Total Protein 6.7 g/dL      Albumin 4.00 g/dL      ALT (SGPT) 22 U/L      AST (SGOT) 16 U/L      Alkaline Phosphatase 176 U/L      Total Bilirubin 0.2 mg/dL      eGFR Non African Amer 88 mL/min/1.73      eGFR  African Amer 106 mL/min/1.73      Globulin 2.7 gm/dL      A/G Ratio 1.5 g/dL      BUN/Creatinine Ratio 25.0     Anion Gap 9.0 mmol/L     Narrative:      GFR Normal >60  Chronic Kidney Disease <60  Kidney Failure <15      Protime-INR [[id number]]  (Normal) Collected: 04/13/21 1832    Specimen: Blood Updated: 04/13/21 1858     Protime 12.9 Seconds      INR 1.01    CBC & Differential [[id number]]  (Abnormal) Collected: 04/13/21 1832    Specimen: Blood Updated: 04/13/21 1849    Narrative:      The following orders were created for panel order CBC & Differential.  Procedure                               Abnormality         Status                     ---------                               -----------         ------                     CBC Auto Differential[[id number]]        Abnormal            Final result                 Please view results for these tests on the individual orders.    CBC Auto Differential [[id number]]  (Abnormal) Collected: 04/13/21 1832    Specimen: Blood Updated: 04/13/21 1849     WBC 6.95 10*3/mm3      RBC 5.01 10*6/mm3      Hemoglobin 13.2 g/dL      Hematocrit 41.7 %      MCV 83.2 fL      MCH 26.3 pg      MCHC 31.7 g/dL      RDW 15.2 %      RDW-SD 45.6 fl      MPV 9.3 fL      Platelets 280 10*3/mm3      Neutrophil % 59.7 %      Lymphocyte % 32.7 %      Monocyte % 6.6 %      Eosinophil % 0.1 %      Basophil % 0.6 %      Immature Grans % 0.3 %      Neutrophils, Absolute 4.15 10*3/mm3      Lymphocytes, Absolute 2.27 10*3/mm3      Monocytes, Absolute 0.46 10*3/mm3      Eosinophils, Absolute 0.01 10*3/mm3      Basophils, Absolute 0.04 10*3/mm3      Immature Grans, Absolute 0.02 10*3/mm3      nRBC 0.0 /100 WBC           .  Imaging Results (Last 24 Hours)      Procedure Component Value Units Date/Time    CT CEREBRAL PERFUSION WITH & WITHOUT CONTRAST [[id number]] Collected: 04/13/21 1908     Updated: 04/13/21 1913    Narrative:      Indication: Slurred speech. Weakness now resolving.     Exam:      1. Perfusion CT is performed to acquire images tracking the temporal  course of iodinated contrast material passing through the cerebral  circulation. Perfusion parameters, such as cerebral blood flow (CBF),  cerebral blood volume (CBV), mean transit time (MTT), etc. are  calculated by [employer] with additional provided perfusion maps and  estimated stroke volumes.  2. Automated exposure control (AEC) protocols are utilized on the  scanner to ensure dose lowered technique.      Comparison: Noncontrast CT brain and brain angiogram dated 4/13/2021  6:47 PM CDT     Findings:     Normal, symmetric and MTT, TTP, CBV and CBF.      Distribution: No perfusion abnormality is demonstrated..     Tmax >6.0 seconds volume: 0 ml     CBF < 30% volume: 0 ml     Mismatch volume: 0 ml      Mismatch ratio: None       Impression:      Impression:  1. Normal, symmetric cerebral perfusion. No discrete infarct detected by  the perfusion software.  This report was finalized on 04/13/2021 19:10 by Dr. Teodoro Mays MD.    CT Angiogram Head [[id number]] Resulted: 04/13/21 1830     Updated: 04/13/21 1849    CT Angiogram Neck [[id number]] Resulted: 04/13/21 1830     Updated: 04/13/21 1849    CT Head Without Contrast Stroke Protocol [[id number]] Collected: 04/13/21 1831     Updated: 04/13/21 1837    Narrative:      EXAMINATION: CT head without contrast could stroke protocol for 13 2021     DOSE: 1262 mGycm. Automated exposure control was utilized to diminish  patient radiation dose..     HISTORY: Transient difficulty with motion.     FINDINGS: Multiple contiguous axials are obtained from the skull base to  the vertex per protocol without intravenous contrast-enhancement with  reformatted images obtained in  the sagittal and coronal projections from  the original data set.     There is evidence of a remote infarct involving the right basal ganglia  with encephalomalacia. There is no evidence of mass, mass effect or  shift of the midline. No evidence of acute infarct or hemorrhage.     Visualized paranasal sinuses and mastoid air cells are normally aerated.  No acute calvarial abnormalities are present.       Impression:      1.. Remote right basal ganglia infarct with focal encephalomalacia.  2. Otherwise unremarkable nonenhanced CT of the brain.  3. The code stroke report was phoned to Dr. Stoner in the emergency  room at 6:31 PM.  This report was finalized on 04/13/2021 18:34 by Dr. Teodoro Mays MD.              Impression    1. Stroke like symptoms that resolved c/w TIA  2. No TPA as symptoms resolved  3. Multiple medications that may have contributed to symptoms  4. Smoker of 1.5 ppd      Plan  · Smoking cessation education  · MRI of brain without  · Cardiac monitoring  · SCD's  · Aspirin  · Lipitor 80 mg  · Hemoglobin A1C  · Lipid panel  · UDS  · U/A  · TSH  · Echo with bubble        I discussed the patients findings and my recommendations with patient, family, nursing staff and Dr Herbie Mobley MD  04/13/21  19:29 CDT

## 2021-04-14 ENCOUNTER — APPOINTMENT (OUTPATIENT)
Dept: MRI IMAGING | Facility: HOSPITAL | Age: 61
End: 2021-04-14

## 2021-04-14 ENCOUNTER — APPOINTMENT (OUTPATIENT)
Dept: CARDIOLOGY | Facility: HOSPITAL | Age: 61
End: 2021-04-14

## 2021-04-14 VITALS
RESPIRATION RATE: 16 BRPM | TEMPERATURE: 98 F | BODY MASS INDEX: 38.52 KG/M2 | OXYGEN SATURATION: 95 % | HEIGHT: 60 IN | DIASTOLIC BLOOD PRESSURE: 46 MMHG | WEIGHT: 196.2 LBS | SYSTOLIC BLOOD PRESSURE: 114 MMHG | HEART RATE: 72 BPM

## 2021-04-14 PROBLEM — Z72.0 TOBACCO USE: Status: ACTIVE | Noted: 2021-04-14

## 2021-04-14 PROBLEM — I63.81 ACUTE LACUNAR STROKE (HCC): Status: ACTIVE | Noted: 2021-04-14

## 2021-04-14 PROBLEM — I63.9 CVA (CEREBRAL VASCULAR ACCIDENT): Status: ACTIVE | Noted: 2021-04-14

## 2021-04-14 LAB
ALBUMIN SERPL-MCNC: 3.8 G/DL (ref 3.5–5.2)
ALBUMIN/GLOB SERPL: 1.4 G/DL
ALP SERPL-CCNC: 160 U/L (ref 39–117)
ALT SERPL W P-5'-P-CCNC: 19 U/L (ref 1–33)
ANION GAP SERPL CALCULATED.3IONS-SCNC: 10 MMOL/L (ref 5–15)
AST SERPL-CCNC: 17 U/L (ref 1–32)
BH CV ECHO MEAS - AO MAX PG (FULL): 7.2 MMHG
BH CV ECHO MEAS - AO MAX PG: 9.4 MMHG
BH CV ECHO MEAS - AO MEAN PG (FULL): 4 MMHG
BH CV ECHO MEAS - AO MEAN PG: 5 MMHG
BH CV ECHO MEAS - AO ROOT AREA (BSA CORRECTED): 1.4
BH CV ECHO MEAS - AO ROOT AREA: 4.9 CM^2
BH CV ECHO MEAS - AO ROOT DIAM: 2.5 CM
BH CV ECHO MEAS - AO V2 MAX: 153 CM/SEC
BH CV ECHO MEAS - AO V2 MEAN: 102 CM/SEC
BH CV ECHO MEAS - AO V2 VTI: 37.3 CM
BH CV ECHO MEAS - AVA(I,A): 1.4 CM^2
BH CV ECHO MEAS - AVA(I,D): 1.4 CM^2
BH CV ECHO MEAS - AVA(V,A): 1.5 CM^2
BH CV ECHO MEAS - AVA(V,D): 1.5 CM^2
BH CV ECHO MEAS - BSA(HAYCOCK): 2 M^2
BH CV ECHO MEAS - BSA: 1.9 M^2
BH CV ECHO MEAS - BZI_BMI: 38.3 KILOGRAMS/M^2
BH CV ECHO MEAS - BZI_METRIC_HEIGHT: 152.4 CM
BH CV ECHO MEAS - BZI_METRIC_WEIGHT: 88.9 KG
BH CV ECHO MEAS - EDV(CUBED): 27.8 ML
BH CV ECHO MEAS - EDV(MOD-SP4): 80.2 ML
BH CV ECHO MEAS - EDV(TEICH): 35.9 ML
BH CV ECHO MEAS - EF(CUBED): 45.2 %
BH CV ECHO MEAS - EF(MOD-SP4): 75.4 %
BH CV ECHO MEAS - EF(TEICH): 39 %
BH CV ECHO MEAS - ESV(CUBED): 15.3 ML
BH CV ECHO MEAS - ESV(MOD-SP4): 19.7 ML
BH CV ECHO MEAS - ESV(TEICH): 21.9 ML
BH CV ECHO MEAS - FS: 18.2 %
BH CV ECHO MEAS - IVS/LVPW: 1.4
BH CV ECHO MEAS - IVSD: 1.3 CM
BH CV ECHO MEAS - LA DIMENSION: 2.8 CM
BH CV ECHO MEAS - LA/AO: 1.1
BH CV ECHO MEAS - LAT PEAK E' VEL: 5.3 CM/SEC
BH CV ECHO MEAS - LV DIASTOLIC VOL/BSA (35-75): 43.3 ML/M^2
BH CV ECHO MEAS - LV MASS(C)D: 96.8 GRAMS
BH CV ECHO MEAS - LV MASS(C)DI: 52.3 GRAMS/M^2
BH CV ECHO MEAS - LV MAX PG: 2.1 MMHG
BH CV ECHO MEAS - LV MEAN PG: 1 MMHG
BH CV ECHO MEAS - LV SYSTOLIC VOL/BSA (12-30): 10.6 ML/M^2
BH CV ECHO MEAS - LV V1 MAX: 73.2 CM/SEC
BH CV ECHO MEAS - LV V1 MEAN: 46.5 CM/SEC
BH CV ECHO MEAS - LV V1 VTI: 17 CM
BH CV ECHO MEAS - LVIDD: 3 CM
BH CV ECHO MEAS - LVIDS: 2.5 CM
BH CV ECHO MEAS - LVLD AP4: 7.4 CM
BH CV ECHO MEAS - LVLS AP4: 5.9 CM
BH CV ECHO MEAS - LVOT AREA (M): 3.1 CM^2
BH CV ECHO MEAS - LVOT AREA: 3.1 CM^2
BH CV ECHO MEAS - LVOT DIAM: 2 CM
BH CV ECHO MEAS - LVPWD: 0.9 CM
BH CV ECHO MEAS - MED PEAK E' VEL: 6.09 CM/SEC
BH CV ECHO MEAS - MR MAX PG: 95.7 MMHG
BH CV ECHO MEAS - MR MAX VEL: 487.3 CM/SEC
BH CV ECHO MEAS - MR MEAN PG: 73 MMHG
BH CV ECHO MEAS - MR MEAN VEL: 402 CM/SEC
BH CV ECHO MEAS - MR VTI: 187 CM
BH CV ECHO MEAS - MV A MAX VEL: 144 CM/SEC
BH CV ECHO MEAS - MV DEC TIME: 0.44 SEC
BH CV ECHO MEAS - MV E MAX VEL: 117 CM/SEC
BH CV ECHO MEAS - MV E/A: 0.81
BH CV ECHO MEAS - SI(AO): 98.9 ML/M^2
BH CV ECHO MEAS - SI(CUBED): 6.8 ML/M^2
BH CV ECHO MEAS - SI(LVOT): 28.9 ML/M^2
BH CV ECHO MEAS - SI(MOD-SP4): 32.7 ML/M^2
BH CV ECHO MEAS - SI(TEICH): 7.6 ML/M^2
BH CV ECHO MEAS - SV(AO): 183.1 ML
BH CV ECHO MEAS - SV(CUBED): 12.6 ML
BH CV ECHO MEAS - SV(LVOT): 53.4 ML
BH CV ECHO MEAS - SV(MOD-SP4): 60.5 ML
BH CV ECHO MEAS - SV(TEICH): 14 ML
BH CV ECHO MEASUREMENTS AVERAGE E/E' RATIO: 20.54
BILIRUB SERPL-MCNC: 0.3 MG/DL (ref 0–1.2)
BUN SERPL-MCNC: 14 MG/DL (ref 8–23)
BUN/CREAT SERPL: 25 (ref 7–25)
CALCIUM SPEC-SCNC: 8.8 MG/DL (ref 8.6–10.5)
CHLORIDE SERPL-SCNC: 104 MMOL/L (ref 98–107)
CHOLEST SERPL-MCNC: 111 MG/DL (ref 0–200)
CO2 SERPL-SCNC: 31 MMOL/L (ref 22–29)
CREAT SERPL-MCNC: 0.56 MG/DL (ref 0.57–1)
DEPRECATED RDW RBC AUTO: 45.6 FL (ref 37–54)
ERYTHROCYTE [DISTWIDTH] IN BLOOD BY AUTOMATED COUNT: 15.3 % (ref 12.3–15.4)
GFR SERPL CREATININE-BSD FRML MDRD: 110 ML/MIN/1.73
GLOBULIN UR ELPH-MCNC: 2.8 GM/DL
GLUCOSE SERPL-MCNC: 91 MG/DL (ref 65–99)
HBA1C MFR BLD: 6.4 % (ref 4.8–5.6)
HCT VFR BLD AUTO: 42.2 % (ref 34–46.6)
HDLC SERPL-MCNC: 25 MG/DL (ref 40–60)
HGB BLD-MCNC: 13.4 G/DL (ref 12–15.9)
LDLC SERPL CALC-MCNC: 64 MG/DL (ref 0–100)
LDLC/HDLC SERPL: 2.46 {RATIO}
LEFT ATRIUM VOLUME INDEX: 23.4 ML/M2
LEFT ATRIUM VOLUME: 43.2 CM3
MAXIMAL PREDICTED HEART RATE: 158 BPM
MCH RBC QN AUTO: 26.4 PG (ref 26.6–33)
MCHC RBC AUTO-ENTMCNC: 31.8 G/DL (ref 31.5–35.7)
MCV RBC AUTO: 83.2 FL (ref 79–97)
PLATELET # BLD AUTO: 258 10*3/MM3 (ref 140–450)
PMV BLD AUTO: 9.2 FL (ref 6–12)
POTASSIUM SERPL-SCNC: 4.1 MMOL/L (ref 3.5–5.2)
PROT SERPL-MCNC: 6.6 G/DL (ref 6–8.5)
QT INTERVAL: 428 MS
QTC INTERVAL: 475 MS
RBC # BLD AUTO: 5.07 10*6/MM3 (ref 3.77–5.28)
SODIUM SERPL-SCNC: 145 MMOL/L (ref 136–145)
STRESS TARGET HR: 134 BPM
TRIGL SERPL-MCNC: 122 MG/DL (ref 0–150)
TSH SERPL DL<=0.05 MIU/L-ACNC: 0.76 UIU/ML (ref 0.27–4.2)
VIT B12 BLD-MCNC: 372 PG/ML (ref 211–946)
VLDLC SERPL-MCNC: 22 MG/DL (ref 5–40)
WBC # BLD AUTO: 5.57 10*3/MM3 (ref 3.4–10.8)

## 2021-04-14 PROCEDURE — 80061 LIPID PANEL: CPT | Performed by: NURSE PRACTITIONER

## 2021-04-14 PROCEDURE — 94640 AIRWAY INHALATION TREATMENT: CPT

## 2021-04-14 PROCEDURE — 80053 COMPREHEN METABOLIC PANEL: CPT | Performed by: NURSE PRACTITIONER

## 2021-04-14 PROCEDURE — 97161 PT EVAL LOW COMPLEX 20 MIN: CPT | Performed by: PHYSICAL THERAPIST

## 2021-04-14 PROCEDURE — 94799 UNLISTED PULMONARY SVC/PX: CPT

## 2021-04-14 PROCEDURE — 70551 MRI BRAIN STEM W/O DYE: CPT

## 2021-04-14 PROCEDURE — 84443 ASSAY THYROID STIM HORMONE: CPT | Performed by: NURSE PRACTITIONER

## 2021-04-14 PROCEDURE — 36415 COLL VENOUS BLD VENIPUNCTURE: CPT | Performed by: NURSE PRACTITIONER

## 2021-04-14 PROCEDURE — 25010000002 PERFLUTREN 6.52 MG/ML SUSPENSION: Performed by: FAMILY MEDICINE

## 2021-04-14 PROCEDURE — 85027 COMPLETE CBC AUTOMATED: CPT | Performed by: NURSE PRACTITIONER

## 2021-04-14 PROCEDURE — 92610 EVALUATE SWALLOWING FUNCTION: CPT | Performed by: SPEECH-LANGUAGE PATHOLOGIST

## 2021-04-14 PROCEDURE — 93306 TTE W/DOPPLER COMPLETE: CPT | Performed by: INTERNAL MEDICINE

## 2021-04-14 PROCEDURE — 83036 HEMOGLOBIN GLYCOSYLATED A1C: CPT | Performed by: NURSE PRACTITIONER

## 2021-04-14 PROCEDURE — 93306 TTE W/DOPPLER COMPLETE: CPT

## 2021-04-14 PROCEDURE — 82607 VITAMIN B-12: CPT | Performed by: NURSE PRACTITIONER

## 2021-04-14 PROCEDURE — 99233 SBSQ HOSP IP/OBS HIGH 50: CPT | Performed by: PSYCHIATRY & NEUROLOGY

## 2021-04-14 RX ORDER — CLOPIDOGREL BISULFATE 75 MG/1
75 TABLET ORAL DAILY
Status: DISCONTINUED | OUTPATIENT
Start: 2021-04-14 | End: 2021-04-14 | Stop reason: HOSPADM

## 2021-04-14 RX ORDER — NITROGLYCERIN 80 MG/1
1 PATCH TRANSDERMAL DAILY
Status: DISCONTINUED | OUTPATIENT
Start: 2021-04-14 | End: 2021-04-14 | Stop reason: HOSPADM

## 2021-04-14 RX ORDER — CLOPIDOGREL BISULFATE 75 MG/1
75 TABLET ORAL DAILY
Qty: 30 TABLET | Refills: 1 | Status: SHIPPED | OUTPATIENT
Start: 2021-04-15

## 2021-04-14 RX ORDER — PANTOPRAZOLE SODIUM 40 MG/1
40 TABLET, DELAYED RELEASE ORAL EVERY MORNING
Status: DISCONTINUED | OUTPATIENT
Start: 2021-04-15 | End: 2021-04-14 | Stop reason: HOSPADM

## 2021-04-14 RX ORDER — NITROGLYCERIN 40 MG/1
1 PATCH TRANSDERMAL DAILY
Status: DISCONTINUED | OUTPATIENT
Start: 2021-04-14 | End: 2021-04-14 | Stop reason: HOSPADM

## 2021-04-14 RX ORDER — ATORVASTATIN CALCIUM 80 MG/1
80 TABLET, FILM COATED ORAL NIGHTLY
Qty: 30 TABLET | Refills: 1 | Status: SHIPPED | OUTPATIENT
Start: 2021-04-14

## 2021-04-14 RX ADMIN — IPRATROPIUM BROMIDE AND ALBUTEROL SULFATE 3 ML: 2.5; .5 SOLUTION RESPIRATORY (INHALATION) at 06:11

## 2021-04-14 RX ADMIN — ASPIRIN 325 MG: 325 TABLET ORAL at 10:09

## 2021-04-14 RX ADMIN — SODIUM CHLORIDE, PRESERVATIVE FREE 10 ML: 5 INJECTION INTRAVENOUS at 10:14

## 2021-04-14 RX ADMIN — CITALOPRAM 10 MG: 10 TABLET, FILM COATED ORAL at 10:09

## 2021-04-14 RX ADMIN — IPRATROPIUM BROMIDE AND ALBUTEROL SULFATE 3 ML: 2.5; .5 SOLUTION RESPIRATORY (INHALATION) at 10:22

## 2021-04-14 RX ADMIN — PERFLUTREN 1.17 MG: 6.52 INJECTION, SUSPENSION INTRAVENOUS at 14:58

## 2021-04-14 RX ADMIN — GABAPENTIN 300 MG: 100 CAPSULE ORAL at 10:09

## 2021-04-14 RX ADMIN — CLOPIDOGREL 75 MG: 75 TABLET, FILM COATED ORAL at 12:24

## 2021-04-14 RX ADMIN — MELOXICAM 7.5 MG: 7.5 TABLET ORAL at 10:09

## 2021-04-14 RX ADMIN — ATENOLOL 50 MG: 50 TABLET ORAL at 10:09

## 2021-04-14 RX ADMIN — GABAPENTIN 300 MG: 100 CAPSULE ORAL at 16:31

## 2021-04-14 RX ADMIN — NITROGLYCERIN 1 PATCH: 0.2 PATCH TRANSDERMAL at 10:10

## 2021-04-14 NOTE — DISCHARGE SUMMARY
[facility]  DISCHARGE SUMMARY     Date of Admission: 4/13/2021  Date of Discharge:  4/14/2021  Primary Care Physician: Nolan Hoffman Jr., MD    Presenting Problem/History of Present Illness:  Presented with right facial drooping and slurred speech    Discharge Diagnoses:  Active Hospital Problems    Diagnosis    • **Acute lacunar stroke left occipital lobe (CMS/HCC)    • CVA (cerebral vascular accident) (CMS/HCC)    • Tobacco use    • History of stroke, right basal ganglia    • Coronary artery disease involving native coronary artery of native heart    • Hyperlipidemia    • Essential hypertension      Chief Complaint on Day of Discharge: No complaints    History of Present Illness on Day of Discharge:   Patient is lying in bed and reports that she is at baseline.  Denies dizziness, word finding issues, facial asymmetry, facial drooping, weakness of extremities and reports that she is at baseline.  I discussed discontinuation of aspirin and starting Plavix 75 mg daily.  Also discussed increasing Lipitor to 80 mg nightly for 3 months and will return to Lipitor 20 mg nightly.  Discussed smoking cessation with patient.  She tells me she wears nicotine patches at home.    Consults: Dr. Alan Mobley, neurology    Procedures Performed:   Transthoracic echocardiogram 4/14/2021  · Left ventricular ejection fraction appears to be 61 - 65%. Left ventricular systolic function is normal.  · Left ventricular diastolic dysfunction is noted.  · Normal right ventricular cavity size and systolic function noted.  · There is no significant (greater than mild) valvular dysfunction.  · There is no evidence of right-to-left shunt on bubble study.  · There is no evidence of intracardiac mass or thrombus    Pertinent Test Results:     Laboratory Data Last 7 Days:  Results from last 7 days   Lab Units 04/14/21  0621 04/13/21  1832   WBC 10*3/mm3 5.57 6.95   HEMOGLOBIN g/dL 13.4 13.2    HEMATOCRIT % 42.2 41.7   PLATELETS 10*3/mm3 258 280     Results from last 7 days   Lab Units 04/14/21  0621 04/13/21  1832   SODIUM mmol/L 145 144   POTASSIUM mmol/L 4.1 4.5   CHLORIDE mmol/L 104 105   CO2 mmol/L 31.0* 30.0*   BUN mg/dL 14 17   CREATININE mg/dL 0.56* 0.68   GLUCOSE mg/dL 91 102*   CALCIUM mg/dL 8.8 8.9   ALT (SGPT) U/L 19 22     Laboratory Data Last 7 Days:    Lab Results (last 7 days)     Procedure Component Value Units Date/Time    Vitamin B12 [[id number]]  (Normal) Collected: 04/14/21 0621    Specimen: Blood Updated: 04/14/21 1431     Vitamin B-12 372 pg/mL     Narrative:      Results may be falsely increased if patient taking Biotin.      Hemoglobin A1c [[id number]]  (Abnormal) Collected: 04/14/21 0621    Specimen: Blood Updated: 04/14/21 0705     Hemoglobin A1C 6.40 %     Narrative:      Hemoglobin A1C Ranges:    Increased Risk for Diabetes  5.7% to 6.4%  Diabetes                     >= 6.5%  Diabetic Goal                < 7.0%    Lipid Panel [[id number]]  (Abnormal) Collected: 04/14/21 0621    Specimen: Blood Updated: 04/14/21 0658     Total Cholesterol 111 mg/dL      Triglycerides 122 mg/dL      HDL Cholesterol 25 mg/dL      LDL Cholesterol  64 mg/dL      VLDL Cholesterol 22 mg/dL      LDL/HDL Ratio 2.46    Narrative:          Comprehensive Metabolic Panel [[id number]]  (Abnormal) Collected: 04/14/21 0621    Specimen: Blood Updated: 04/14/21 0658     Glucose 91 mg/dL      BUN 14 mg/dL      Creatinine 0.56 mg/dL      Sodium 145 mmol/L      Potassium 4.1 mmol/L      Comment: Slight hemolysis detected by analyzer. Results may be affected.        Chloride 104 mmol/L      CO2 31.0 mmol/L      Calcium 8.8 mg/dL      Total Protein 6.6 g/dL      Albumin 3.80 g/dL      ALT (SGPT) 19 U/L      AST (SGOT) 17 U/L      Alkaline Phosphatase 160 U/L      Total Bilirubin 0.3 mg/dL      eGFR Non African Amer 110 mL/min/1.73      Globulin 2.8 gm/dL      A/G Ratio 1.4 g/dL      BUN/Creatinine Ratio 25.0     Anion  Gap 10.0 mmol/L     Narrative:      GFR Normal >60  Chronic Kidney Disease <60  Kidney Failure <15      TSH [[id number]]  (Normal) Collected: 04/14/21 0621    Specimen: Blood Updated: 04/14/21 0658     TSH 0.763 uIU/mL     CBC (No Diff) [[id number]]  (Abnormal) Collected: 04/14/21 0621    Specimen: Blood Updated: 04/14/21 0629     WBC 5.57 10*3/mm3      RBC 5.07 10*6/mm3      Hemoglobin 13.4 g/dL      Hematocrit 42.2 %      MCV 83.2 fL      MCH 26.4 pg      MCHC 31.8 g/dL      RDW 15.3 %      RDW-SD 45.6 fl      MPV 9.2 fL      Platelets 258 10*3/mm3     POC Glucose Once [[id number]]  (Normal) Collected: 04/13/21 2344    Specimen: Blood Updated: 04/13/21 2358     Glucose 95 mg/dL      Comment: : [id number] Quinn KristaMeter ID: [id number]       COVID-19, ABBOTT IN-HOUSE,NASAL Swab (NO TRANSPORT MEDIA) 2 HR TAT - Swab, Nasopharynx [[id number]]  (Normal) Collected: 04/13/21 2058    Specimen: Swab from Nasopharynx Updated: 04/13/21 2126     COVID19 Presumptive Negative    Narrative:      Fact sheet for providers: https://www.fda.gov/media/714599/download     Fact sheet for patients: https://www.fda.gov/media/830540/download    Test performed by PCR.  If inconsistent with clinical signs and symptoms patient should be tested with different authorized molecular test.    Urine Drug Screen - Urine, Clean Catch [[id number]]  (Abnormal) Collected: 04/13/21 2022    Specimen: Urine, Clean Catch Updated: 04/13/21 2056     THC, Screen, Urine Positive     Phencyclidine (PCP), Urine Negative     Cocaine Screen, Urine Negative     Methamphetamine, Ur Negative     Opiate Screen Negative     Amphetamine Screen, Urine Negative     Benzodiazepine Screen, Urine Negative     Tricyclic Antidepressants Screen Positive     Methadone Screen, Urine Negative     Barbiturates Screen, Urine Negative     Oxycodone Screen, Urine Negative     Propoxyphene Screen Negative     Buprenorphine, Screen, Urine Negative    Narrative:      Cutoff For Drugs  Screened:    Amphetamines               500 ng/ml  Barbiturates               200 ng/ml  Benzodiazepines            150 ng/ml  Cocaine                    150 ng/ml  Methadone                  200 ng/ml  Opiates                    100 ng/ml  Phencyclidine               25 ng/ml  THC                            50 ng/ml  Methamphetamine            500 ng/ml  Tricyclic Antidepressants  300 ng/ml  Oxycodone                  100 ng/ml  Propoxyphene               300 ng/ml  Buprenorphine               10 ng/ml    The normal value for all drugs tested is negative. This report includes unconfirmed screening results, with the cutoff values listed, to be used for medical treatment purposes only.  Unconfirmed results must not be used for non-medical purposes such as employment or legal testing.  Clinical consideration should be applied to any drug of abuse test, particularly when unconfirmed results are used.      Urinalysis With Culture If Indicated - Urine, Clean Catch [[id number]]  (Abnormal) Collected: 04/13/21 2023    Specimen: Urine, Clean Catch Updated: 04/13/21 2032     Color, UA Yellow     Appearance, UA Clear     pH, UA 6.0     Specific Gravity, UA >1.030     Glucose, UA Negative     Ketones, UA Negative     Bilirubin, UA Negative     Blood, UA Negative     Protein, UA Negative     Leuk Esterase, UA Negative     Nitrite, UA Negative     Urobilinogen, UA 1.0 E.U./dL    Narrative:      Urine microscopic not indicated.    [place] Draw [[id number]] Collected: 04/13/21 1832    Specimen: Blood Updated: 04/13/21 1945    Narrative:      The following orders were created for panel order [place] Draw.  Procedure                               Abnormality         Status                     ---------                               -----------         ------                     Light Blue Top[[id number]]                                   Final result               Green Top (Gel)[[id number]]                                  Final result                Lavender Top[[id number]]                                     Final result               Red Top[[id number]]                                          Final result                 Please view results for these tests on the individual orders.    Green Top (Gel) [[id number]] Collected: 04/13/21 1832    Specimen: Blood Updated: 04/13/21 1945     Extra Tube Hold for add-ons.     Comment: Auto resulted.       Red Top [[id number]] Collected: 04/13/21 1832    Specimen: Blood Updated: 04/13/21 1945     Extra Tube Hold for add-ons.     Comment: Auto resulted.       Light Blue Top [[id number]] Collected: 04/13/21 1832    Specimen: Blood Updated: 04/13/21 1945     Extra Tube hold for add-on     Comment: Auto resulted       Lavender Top [[id number]] Collected: 04/13/21 1832    Specimen: Blood Updated: 04/13/21 1945     Extra Tube hold for add-on     Comment: Auto resulted       Ethanol [[id number]] Collected: 04/13/21 1832    Specimen: Blood Updated: 04/13/21 1916     Ethanol % <0.010 %     Narrative:      Not for legal purposes. Chain of Custody not followed.     TSH [[id number]]  (Normal) Collected: 04/13/21 1832    Specimen: Blood Updated: 04/13/21 1914     TSH 0.813 uIU/mL     Comprehensive Metabolic Panel [[id number]]  (Abnormal) Collected: 04/13/21 1832    Specimen: Blood Updated: 04/13/21 1909     Glucose 102 mg/dL      BUN 17 mg/dL      Creatinine 0.68 mg/dL      Sodium 144 mmol/L      Potassium 4.5 mmol/L      Chloride 105 mmol/L      CO2 30.0 mmol/L      Calcium 8.9 mg/dL      Total Protein 6.7 g/dL      Albumin 4.00 g/dL      ALT (SGPT) 22 U/L      AST (SGOT) 16 U/L      Alkaline Phosphatase 176 U/L      Total Bilirubin 0.2 mg/dL      eGFR Non African Amer 88 mL/min/1.73      eGFR  African Amer 106 mL/min/1.73      Globulin 2.7 gm/dL      A/G Ratio 1.5 g/dL      BUN/Creatinine Ratio 25.0     Anion Gap 9.0 mmol/L     Narrative:      GFR Normal >60  Chronic Kidney Disease <60  Kidney Failure <15      Protime-INR  [[id number]]  (Normal) Collected: 04/13/21 1832    Specimen: Blood Updated: 04/13/21 1858     Protime 12.9 Seconds      INR 1.01    CBC & Differential [[id number]]  (Abnormal) Collected: 04/13/21 1832    Specimen: Blood Updated: 04/13/21 1849    Narrative:      The following orders were created for panel order CBC & Differential.  Procedure                               Abnormality         Status                     ---------                               -----------         ------                     CBC Auto Differential[[id number]]        Abnormal            Final result                 Please view results for these tests on the individual orders.    CBC Auto Differential [[id number]]  (Abnormal) Collected: 04/13/21 1832    Specimen: Blood Updated: 04/13/21 1849     WBC 6.95 10*3/mm3      RBC 5.01 10*6/mm3      Hemoglobin 13.2 g/dL      Hematocrit 41.7 %      MCV 83.2 fL      MCH 26.3 pg      MCHC 31.7 g/dL      RDW 15.2 %      RDW-SD 45.6 fl      MPV 9.3 fL      Platelets 280 10*3/mm3      Neutrophil % 59.7 %      Lymphocyte % 32.7 %      Monocyte % 6.6 %      Eosinophil % 0.1 %      Basophil % 0.6 %      Immature Grans % 0.3 %      Neutrophils, Absolute 4.15 10*3/mm3      Lymphocytes, Absolute 2.27 10*3/mm3      Monocytes, Absolute 0.46 10*3/mm3      Eosinophils, Absolute 0.01 10*3/mm3      Basophils, Absolute 0.04 10*3/mm3      Immature Grans, Absolute 0.02 10*3/mm3      nRBC 0.0 /100 WBC         Imaging Results (All)     Procedure Component Value Units Date/Time    MRI Brain Without Contrast [[id number]] Collected: 04/14/21 0857     Updated: 04/14/21 0908    Narrative:      EXAM: MR BRAIN WITHOUT IV CONTRAST 4/14/2021     COMPARISON: Head CT dated 4/13/2021      HISTORY: 61 years-old Female. Stroke      TECHNIQUE:   Routine pulse sequences of the brain were obtained without IV contrast.      REPORT:     Focus of diffusion restriction in the left occipital lobe, in an area  measuring 1 cm, consistent with acute  lacunar infarct. There is no  evidence of hemorrhagic conversion.     Remote lacunar infarct in the right basal ganglia.     Mild chronic microvascular changes.     Mild generalized cerebral volume loss.     The flow-voids of [place] are maintained centrally.     The sella, parasellar region, brainstem and cerebellum demonstrate no  acute findings.     The basal cisterns are preserved.     The intraorbital structures demonstrate no acute findings.     Noncoalescent mastoid effusions, right greater than left.     The paranasal sinuses are free of obstructive disease.             Impression:      1.  Acute lacunar infarct in the left occipital lobe. No hemorrhagic  conversion.  2.  Remote right basal ganglia infarct.  3.  Mild chronic microvascular changes.  This report was finalized on 04/14/2021 09:05 by Dr. Jessica Arambula MD.    XR Chest 1 View [[id number]] Collected: 04/13/21 2015     Updated: 04/13/21 2019    Narrative:      EXAMINATION: Chest 1 view 4/13/2021     HISTORY: Stroke protocol     FINDINGS: Upright frontal projection of chest demonstrates a right  basilar granuloma as well as calcified right perihilar lymph nodes.  Lungs are otherwise clear. There is no effusion or free air present.       Impression:      1. No acute disease.  This report was finalized on 04/13/2021 20:16 by Dr. Teodoro Mays MD.    CT Angiogram Head [[id number]] Collected: 04/13/21 2004     Updated: 04/13/21 2016    Narrative:      EXAMINATION: CT angiogram of the head 4/13/2021     DOSE: 182.5 mGycm. Automated exposure control was utilized to diminish  patient radiation dose..     HISTORY: Weakness now resolving.     FINDINGS: CT angiography was performed of the [place] following  intravenous contrast administration with reformatted images obtained in  the sagittal and coronal projections from the original data set as well  as three-dimensional reconstructions. MIPS are also obtained.     There is mild calcific  plaquing involving the distal extracranial aspect  of both vertebral arteries and the proximal intracranial aspect of the  right vertebral artery without evidence of rate limiting stenosis. The  right vertebral artery is dominant.     The posterior inferior cerebellar arteries are patent. The basilar  artery is normal in caliber. The superior cerebellar arteries and  posterior cerebral arteries are widely patent. There is a persistent  fetal origin of the left posterior cerebral artery.     Both distal ICAs are widely patent. There is calcific plaquing involving  the cavernous segment of both ICAs as well as calcific plaquing  involving the supraclinoid aspect of both ICAs with mild associated  stenosis. The anterior and middle cerebral arteries are normal in  caliber without evidence of focal stenosis or discrete berry aneurysm.     There is no evidence of dural venous sinus thrombosis.       Impression:      1.. Mild calcific plaquing involving the distal extracranial aspect of  both vertebral arteries and the proximal intracranial aspect of the  right vertebral artery without evidence of rate limiting stenosis.  2. Persistent fetal origin of the left posterior cerebral artery. There  is also a P1 segment on the left contributing to the left posterior  cerebral circulation. The posterior circulation is otherwise  unremarkable.  3. Mild calcific plaquing involving the cavernous and supraclinoid  aspect of both ICAs with mild associated stenosis. The anterior and  middle cerebral arteries demonstrate normal enhancement with no evidence  of intraluminal thrombus, focal steno-occlusive disease or discrete  berry aneurysm.  4. Normal enhancement of the dural venous sinuses with no evidence of  dural venous sinus thrombosis.  This report was finalized on 04/13/2021 20:13 by Dr. Teodoro Mays MD.    CT Angiogram Neck [[id number]] Collected: 04/13/21 1931     Updated: 04/13/21 1947    Narrative:      EXAMINATION: CT  angiogram of the neck 4/13/2021     HISTORY: Code stroke.     DOSE: 182.5 mGycm. Automated exposure control was utilized to diminish  patient radiation dose..     FINDINGS: Multiple contiguous axials are obtained from the aortic arch  through the skull base at 1 mm intervals following intravenous contrast  infusion with reformatted images obtained in the sagittal and coronal  projections from the original data set as well as MIPS.     There are changes of centrilobular emphysema within the lung apices.  There is atheromatous calcification of the aortic arch. The origin of  the great vessels are unremarkable. The right vertebral artery is  dominant.     There is diffuse enlargement of the thyroid gland with heterogeneity  within the right lobe suggesting a nodule measuring approximately 1.3 cm  in size. This may be related to a multinodular goiter. The level of the  true and false cords is unremarkable. No enlarged cervical chain or  posterior triangle lymphadenopathy is demonstrated. The nasopharynx and  lateral parapharyngeal spaces are symmetric with no mass or mass effect.  Visualized orbits as well as the paranasal sinuses and mastoid air cells  are normally aerated.     As previously noted the right vertebral artery is dominant. There is  mild calcific plaquing at the distal extracranial aspect of the left  vertebral artery and right vertebral artery as well as within the  proximal intracranial aspect of the right vertebral artery without  evidence of rate limiting stenosis.     Examination of the right common carotid artery demonstrates no focal  stenosis or plaquing to the level of the carotid bifurcation. There is  mixed plaquing involving the carotid bulb and proximal ICA without  evidence of a hemodynamically significant stenosis utilizing NASCET  criteria.     Examination of the left common carotid artery demonstrates mild  scattered calcific plaquing involving the mid segment of the CCA with  an  approximate 25% cross-sectional stenosis within the distal CCA just  proximal to the carotid bifurcation. There is mixed plaquing involving  the carotid bulb and extending into the proximal aspect of the left ICA  resulting in a 31% cross-sectional stenosis based on NASCET criteria.  The more distal ICAs are widely patent.       Impression:      1.. There is mild calcific plaquing of the aortic arch. The origin of  the great vessels are unremarkable.  2. The right vertebral artery is dominant. There is mild calcific  plaquing involving the proximal intracranial aspect of the right  vertebral artery as well as the distal extracranial aspect of both  vertebral arteries without evidence of associated rate limiting  stenosis.  3. There is mixed plaquing involving the right carotid bulb and proximal  ICA. Based on NASCET criteria there is no significant stenosis. The more  distal right ICA is widely patent. On the left there is scattered  plaquing involving the mid and distal left CCA with a mild stenosis of  approximately 25% just below the left carotid bifurcation. There is  mixed plaquing involving the carotid bulb and proximal aspect of the  left ICA with an approximate 31% cross-sectional stenosis within the  proximal left ICA. The more distal left ICA is widely patent.  This report was finalized on 04/13/2021 19:44 by Dr. Teodoro Mays MD.    CT CEREBRAL PERFUSION WITH & WITHOUT CONTRAST [[id number]] Collected: 04/13/21 1908     Updated: 04/13/21 1913    Narrative:      Indication: Slurred speech. Weakness now resolving.     Exam:      1. Perfusion CT is performed to acquire images tracking the temporal  course of iodinated contrast material passing through the cerebral  circulation. Perfusion parameters, such as cerebral blood flow (CBF),  cerebral blood volume (CBV), mean transit time (MTT), etc. are  calculated by [employer] with additional provided perfusion maps and  estimated stroke volumes.  2. Automated  exposure control (AEC) protocols are utilized on the  scanner to ensure dose lowered technique.      Comparison: Noncontrast CT brain and brain angiogram dated 4/13/2021  6:47 PM CDT     Findings:     Normal, symmetric and MTT, TTP, CBV and CBF.      Distribution: No perfusion abnormality is demonstrated..     Tmax >6.0 seconds volume: 0 ml     CBF < 30% volume: 0 ml     Mismatch volume: 0 ml      Mismatch ratio: None       Impression:      Impression:  1. Normal, symmetric cerebral perfusion. No discrete infarct detected by  the perfusion software.  This report was finalized on 04/13/2021 19:10 by Dr. Teodoro Mays MD.    CT Head Without Contrast Stroke Protocol [[id number]] Collected: 04/13/21 1831     Updated: 04/13/21 1837    Narrative:      EXAMINATION: CT head without contrast could stroke protocol for 13 2021     DOSE: 1262 mGycm. Automated exposure control was utilized to diminish  patient radiation dose..     HISTORY: Transient difficulty with motion.     FINDINGS: Multiple contiguous axials are obtained from the skull base to  the vertex per protocol without intravenous contrast-enhancement with  reformatted images obtained in the sagittal and coronal projections from  the original data set.     There is evidence of a remote infarct involving the right basal ganglia  with encephalomalacia. There is no evidence of mass, mass effect or  shift of the midline. No evidence of acute infarct or hemorrhage.     Visualized paranasal sinuses and mastoid air cells are normally aerated.  No acute calvarial abnormalities are present.       Impression:      1.. Remote right basal ganglia infarct with focal encephalomalacia.  2. Otherwise unremarkable nonenhanced CT of the brain.  3. The code stroke report was phoned to Dr. Stoner in the emergency  room at 6:31 PM.  This report was finalized on 04/13/2021 18:34 by Dr. Teodoro Mays MD.        Hospital Course  Patient is a 61 y.o. female presented to [facility] emergency room 4/13/21 with complaints of right facial droop, slurred speech.  She has a history of previous stroke, coronary artery disease, hyperlipidemia, hypertension, tobacco dependence, peripheral vascular disease ventricular fibrillation.  Patient reported using CBD candy at 11 AM.  She reported she threw the medication up and was dizzy and was not feeling well.  She laid down for nap at 2 PM.  Patient reported she got up at 4 PM and her daughter thought that she was normal then [facility] and was not there long before she began to walk funny leaning to the right and head tilted and turned to the left.  Daughter thought this was at 6:15 but EMS reported it was at 5 PM.  Patient reported feeling dizzy, lightheaded and drunk feeling.  Daughter noted that patient was not talking right and had a mild right facial drooping.  EMS also noted right facial drooping.  Upon arrival nursing staff noted patient would read a sentence given to her and miss many of the words.  She had decreased sensation on the right.  Code stroke called as patient dysarthric and she was evaluated by Dr. Mobley neurology.  Stat CT scan of the head showed remote right basal ganglia infarct with focal encephalomalacia's.  Otherwise unremarkable.  Urine drug screen positive for THC tricyclic antidepressants.  Chest x-ray no active disease.  CT angiogram of the head noted mild calcific plaquing involving the distal extracranial aspect of both vertebral arteries and proximal intracranial aspect of right vertebral artery without evidence of rate limiting stenosis.  Persistent fetal origin of the left posterior cerebral artery.  P1 segment on the left contributing to the left posterior cerebral circumflex.  Posterior circulation otherwise unremarkable.  Mild calcific plaquing involving the cavernous and supraclinoid aspect of both ICAs and mild associated stenosis.  Anterior and middle cerebral arteries demonstrate  normal enhancement with no evidence of intraluminal thrombus, focal stenosis occlusive disease or discrete berry aneurysm.  No evidence of dural venous sinus thrombosis.  CT angiogram of the neck noted mixed plaquing involving the right carotid bulb and proximal ICA.  Based onNASCET criteria there is no significant stenosis.  The more distal right ICA widely patent.  Left scattered plaquing involving the mid and distal left CCA with mild stenosis of approximately 25% below left carotid bifurcation.  Mixed plaquing involving the carotid bulb and proximal aspect of the left ICA with approximate 31% cross-sectional stenosis within the left ICA.  Distal left ICA patent.    She was admitted to the neurology floor with strokelike symptoms that resolved compatible with TIA per neurology.  Dr. Mobley noted no TPA as symptoms resolved, multiple medications may have contributed to symptoms.  Recommendations to proceed with MRI, cardiac monitoring, aspirin and Lipitor.  She was placed on telemetry and remained normal sinus rhythm 64-87.  SCDs for deep vein thrombosis prophylaxis.  Aspirin 81 mg and Lipitor 80 mg nightly initiated.  Ongoing work-up included MRI of the brain that showed acute lacunar infarct in the left occipital lobe.  No hemorrhagic conversion.  Remote right basal ganglia infarct.  Mild chronic microvascular changes.  After results of MRI available, neurology recommended to discontinue aspirin and start Plavix 75 mg orally daily. Recommend Lipitor 80 mg nightly and continue for 3 months and then may lower back to home dose Lipitor 20 mg nightly as LDL is at goal 64.  14-day Zio patch recommended at discharge.    Transthoracic echocardiogram 4/14/21 reported ejection fraction 61-65%.  Left ventricular systolic function normal.  Left ventricular diastolic dysfunction noted.  Normal right ventricular cavity size and systolic function.  No significant valvular dysfunction.  No evidence of right to left shunt on  "bubble study.  No evidence of intracardiac mass or thrombus.  Results discussed with patient.    Speech therapy consulted and recommended mechanical soft diet.  Physical therapy noted slight left leg weakness.  Otherwise no focal neurological deficits in strength, sensation or coordination.  Patient steady during gait and no further needs for physical therapy.    Hemoglobin A1c 6.4.  TSH 0.763.    Lipid panel LDL 64 at goal.  Cholesterol 111, triglycerides 122.  Neurology recommends 80 mg of Lipitor for 3 months and then may reduce to home dose Lipitor 20 mg orally nightly.    Discussed cessation of CBD products.  Patient denies marijuana use.    On 4/14/2021 she is stable for discharge.  Patient was evaluated by neurology.  MRI positive for acute lacunar infarct left occipital region.  Aspirin discontinued and patient was started on Plavix 75 mg orally daily.  Lipitor increased to 80 mg nightly for 3 months.  Follow-up with neurology 6/30/2021.  Follow-up with Dr. Nolan Hoffman 4/22/2021.    Physical Exam on Discharge:  /46 (BP Location: Left arm, Patient Position: Lying)   Pulse 72   Temp 98 °F (36.7 °C) (Oral)   Resp 16   Ht 152.4 cm (60\")   Wt 89 kg (196 lb 3.2 oz)   SpO2 95%   BMI 38.32 kg/m²   Physical Exam  Vitals and nursing note reviewed.   Constitutional:       Appearance: She is obese.      Comments: Lying in bed.  No oxygen use.   HENT:      Head: Normocephalic and atraumatic.      Nose: No congestion.      Mouth/Throat:      Mouth: Mucous membranes are moist.      Pharynx: No oropharyngeal exudate.      Comments: Edentulous.  Eyes:      Extraocular Movements: Extraocular movements intact.      Pupils: Pupils are equal, round, and reactive to light.   Cardiovascular:      Rate and Rhythm: Normal rate and regular rhythm.      Heart sounds: No murmur heard.        Comments: Normal sinus rhythm 64-87 on telemetry  Pulmonary:      Breath sounds: No wheezing, rhonchi or rales.      Comments: No " oxygen in use.  Abdominal:      Palpations: Abdomen is soft.   Genitourinary:     Comments: Voiding.  Musculoskeletal:         General: No swelling or tenderness.      Cervical back: Normal range of motion and neck supple.   Skin:     General: Skin is warm and dry.   Neurological:      General: No focal deficit present.      Mental Status: She is alert and oriented to person, place, and time. Mental status is at baseline.      Comments: No word finding issues.  No facial asymmetry.  No drooping noted.  Tongue midline.  Speech fluent.  Follows all commands equally.  Moves extremities equally.   Psychiatric:         Mood and Affect: Mood normal.         Behavior: Behavior normal.         Thought Content: Thought content normal.         Judgment: Judgment normal.       Condition on Discharge: Stable    Discharge Disposition: Home    Discharge Diet:   Diet Instructions     Advance Diet As Tolerated        As tolerated    Activity at Discharge:   Activity Instructions     Activity as Tolerated        As tolerated    Discharge Care Plan / Instructions:   1.  For worsening or returning symptoms of slurred speech, facial drooping extremity weakness seek medical attention.  2.  Discontinue aspirin  3.  Plavix 75 mg orally daily  4.  Lipitor 80 mg orally nightly for 3 months then may lower back to home dose Lipitor 20 mg nightly as LDL is at goal at 64.  5.  14-day Zio patch at discharge.  6.  Smoking cessation discussed.  Discussed discontinuation of CBD products.   7.  Follow-up with Dr. Nolan Hoffman/22/21  8.  Follow-up with neurology 6/30/2021  9.  Discontinue meloxicam    Discharge Medications:     Discharge Medications      New Medications      Instructions Start Date   clopidogrel 75 MG tablet  Commonly known as: PLAVIX   75 mg, Oral, Daily   Start Date: April 15, 2021        Changes to Medications      Instructions Start Date   atorvastatin 80 MG tablet  Commonly known as: LIPITOR  What changed:   · medication  strength  · how much to take   80 mg, Oral, Nightly         Continue These Medications      Instructions Start Date   albuterol sulfate  (90 Base) MCG/ACT inhaler  Commonly known as: PROVENTIL HFA;VENTOLIN HFA;PROAIR HFA   2 puffs, Inhalation, Every 4 Hours PRN      atenolol 50 MG tablet  Commonly known as: TENORMIN   50 mg, Oral, Daily      Breo Ellipta 100-25 MCG/INH inhaler  Generic drug: Fluticasone Furoate-Vilanterol   1 puff, Inhalation, Daily - RT      citalopram 10 MG tablet  Commonly known as: CeleXA   10 mg, Oral, Daily      cyclobenzaprine 10 MG tablet  Commonly known as: FLEXERIL   10 mg, Oral, 2 Times Daily PRN      gabapentin 300 MG capsule  Commonly known as: NEURONTIN   300 mg, Oral, 3 Times Daily      nitroglycerin 0.6 MG/HR patch  Commonly known as: NITRODUR   1 patch, Transdermal, Daily      omeprazole 20 MG capsule  Commonly known as: priLOSEC   20 mg, Oral, Daily         Stop These Medications    aspirin 325 MG tablet     meloxicam 7.5 MG tablet  Commonly known as: MOBIC          Follow-up Appointments:   Follow-up Information     [facility] NEUROLOGY Follow up in 3 week(s).    Specialty: Neurology  Why: 3-4 weeks, any provider  Contact information:  [address] [facility] [address] [address] [phone]  Additional information:  Phone Number: [phone].  Located at [facility], building [address], across from parking garage.           Bijal Pineda APRN Follow up in 3 week(s).    Specialty: Neurology  Why: 1st available appointment is June 30, 2021 at 3:15 p.m. if a time opens up the office will call you  Contact information:  [address] [facility] [address]  [facility] [id number] [phone]             Nolan Hoffman Jr., MD Follow up in 1 week(s).    Specialty: Family Medicine  Why: April 22, 2021 at 9:30 a.m.  Contact information:  [address] [facility] [address]  [phone]                 Future Appointments:  Future  Appointments   Date Time Provider Department Center   6/30/2021  3:15 PM Bijal Pineda APRN [facility] PAD PAD     Test Results Pending at Discharge: None    The above documentation resulted from a face-to-face encounter by me Yumiko CONTRERAS, [facility].    Electronically signed by BEN Duffy, 4/14/2021, 16:04 CDT.    Time: This discharge process required greater than 35 minutes for completion.    Plan discussed with Viri Torres APRN and patient    Time spent in face-to-face evaluation, chart review, planning and education greater than 35 minutes.

## 2021-04-14 NOTE — ED PROVIDER NOTES
"Subjective   61-year-old female presents to the ER with concern for stroke.  Patient was last seen normal approximately 1 hour prior to arrival.  She does have a history of previous stroke, hypertension, hyperlipidemia, coronary disease, COPD, ongoing tobacco abuse.  Patient also admits to marijuana use, did use marijuana today.  About 1 hour prior to arrival to the hospital, patient developed some slurred speech, right-sided facial droop, mild right arm weakness and was \"listing\" to the right.  Family called 911.  When paramedics arrived on scene they did not notice any right arm weakness but they did feel that she had some facial droop and slurred speech.  But time patient got to the emergency department, her symptoms had resolved.  Questionable mild slurred speech versus dysarthria from being under the influence of marijuana.  She had no facial droop and no upper or lower extremity weakness.  NIH on arrival was 1.      History provided by:  Patient      Review of Systems   All other systems reviewed and are negative.      Past Medical History:   Diagnosis Date   • Stroke (CMS/[facility])        No Known Allergies    History reviewed. No pertinent surgical history.    History reviewed. No pertinent family history.    Social History     Socioeconomic History   • Marital status:      Spouse name: Not on file   • Number of children: Not on file   • Years of education: Not on file   • Highest education level: Not on file   Tobacco Use   • Smoking status: Current Every Day Smoker     Packs/day: 1.50     Types: Cigarettes   Substance and Sexual Activity   • Alcohol use: Not Currently   • Drug use: Yes           Objective   Physical Exam  Vitals and nursing note reviewed.   Constitutional:       General: She is not in acute distress.     Appearance: Normal appearance. She is normal weight.   HENT:      Head: Normocephalic and atraumatic.      Nose: Nose normal.      Mouth/Throat:      Mouth: Mucous membranes are moist. "      Pharynx: Oropharynx is clear. No oropharyngeal exudate or posterior oropharyngeal erythema.   Eyes:      Extraocular Movements: Extraocular movements intact.      Conjunctiva/sclera: Conjunctivae normal.      Pupils: Pupils are equal, round, and reactive to light.   Cardiovascular:      Rate and Rhythm: Normal rate and regular rhythm.      Pulses: Normal pulses.      Heart sounds: Normal heart sounds.   Pulmonary:      Effort: Pulmonary effort is normal.      Breath sounds: Normal breath sounds. No wheezing, rhonchi or rales.   Abdominal:      General: Abdomen is flat. Bowel sounds are normal. There is no distension.      Palpations: Abdomen is soft.      Tenderness: There is no abdominal tenderness.   Musculoskeletal:         General: No tenderness. Normal range of motion.      Cervical back: Normal range of motion and neck supple. No rigidity. No muscular tenderness.      Right lower leg: No edema.      Left lower leg: No edema.   Skin:     General: Skin is warm and dry.      Capillary Refill: Capillary refill takes less than 2 seconds.      Findings: No rash.   Neurological:      General: No focal deficit present.      Mental Status: She is alert and oriented to person, place, and time. Mental status is at baseline.      Cranial Nerves: No cranial nerve deficit.      Sensory: No sensory deficit.      Motor: No weakness.   Psychiatric:         Mood and Affect: Mood normal.         Behavior: Behavior normal.         Thought Content: Thought content normal.         ECG 12 Lead      Date/Time: 4/13/2021 7:19 PM  Performed by: Seth Stoner MD  Authorized by: Seth Stoner MD   Interpreted by physician  Comments: Sinus rhythm with occasional PVC, LAE, low voltage QRS, no acute ischemic changes, abnormal EKG                 ED Course  ED Course as of Apr 13 2105   Tue Apr 13, 2021 2027 59-year-old female with history of previous stroke, coronary disease, hypertension, hyperlipidemia, COPD and  ongoing tobacco abuse who developed sudden onset slurred speech, facial droop, right arm weakness about an hour prior to arrival.    Patient symptoms had improved about 10-second to the ER NIH was 1, had mild dysarthria.  Although the patient did not seem to be a candidate for TPA at this point time, there was some concerned about possible stuttering TIA and patient may be even had intermittent large vessel occlusion so we did order CT of the head, CTA head neck and CT with perfusion.    Consulted neurology, by the time they got to the emergency department evaluate the patient symptoms completely resolved and her NIH was 0.  Given her history, they did recommend admission for inpatient TIA work-up.    [AW]   2105 Repeat NIH at 2030 0    [AW]      ED Course User Index  [AW] Seth Stoner MD                                         NIHSS (NIH Stroke Scale/Score) reviewed and/or performed as part of the patient evaluation and treatment planning process.  The result associated with this review/performance is: 1       MDM  Number of Diagnoses or Management Options  TIA (transient ischemic attack): new and requires workup     Amount and/or Complexity of Data Reviewed  Clinical lab tests: reviewed  Tests in the radiology section of CPT®: reviewed  Tests in the medicine section of CPT®: reviewed    Risk of Complications, Morbidity, and/or Mortality  Presenting problems: high  Diagnostic procedures: high  Management options: high    Patient Progress  Patient progress: stable      Final diagnoses:   TIA (transient ischemic attack)       ED Disposition  ED Disposition     ED Disposition Condition Comment    Decision to Admit  Level of Care: Med/Surg [1]   Diagnosis: TIA (transient ischemic attack) [438304]   Admitting Physician: GRAY HOLLOWAY [[id number]]   Attending Physician: GRAY HOLLOWAY [[id number]]            No follow-up provider specified.       Medication List      No changes were made to your  prescriptions during this visit.          Seth Stoner MD  04/13/21 4501

## 2021-04-14 NOTE — THERAPY DISCHARGE NOTE
Patient Name: Loren Doll  : 1960    MRN: [id number]                              Today's Date: 2021       Admit Date: 2021    Visit Dx:     ICD-10-CM ICD-9-CM   1. TIA (transient ischemic attack)  G45.9 435.9   2. Dysphagia, unspecified type  R13.10 787.20     Patient Active Problem List   Diagnosis   • TIA (transient ischemic attack)   • History of stroke, right basal ganglia   • Coronary artery disease involving native coronary artery of native heart   • Hyperlipidemia   • Essential hypertension   • CVA (cerebral vascular accident) (CMS/HCC)   • Acute lacunar stroke left occipital lobe (CMS/HCC)   • Tobacco use     Past Medical History:   Diagnosis Date   • Coronary artery disease    • Hyperlipidemia    • Hypertension    • Myocardial infarction (CMS/[facility])    • Peripheral vascular disease (CMS/HCC)    • Stroke (CMS/[facility])    • Tobacco dependence    • VF (ventricular fibrillation) (CMS/[facility])      Past Surgical History:   Procedure Laterality Date   •  SECTION     • CHOLECYSTECTOMY       General Information     Row Name 21 1130          Physical Therapy Time and Intention    Document Type  evaluation acute lacunar infarct L occipital lobe, presented with speech diffiuculty and weakness  -MS     Mode of Treatment  physical therapy;individual therapy  -MS     Row Name 21 1130          General Information    Patient Profile Reviewed  yes  -MS     Prior Level of Function  independent:;all household mobility;ADL's;community mobility  -MS     Existing Precautions/Restrictions  no known precautions/restrictions  -MS     Barriers to Rehab  none identified  -MS     Row Name 21 1130          Living Environment    Lives With  child(eunice), adult  -MS     Row Name 21 1130          Home Main Entrance    Number of Stairs, Main Entrance  none  -MS     Row Name 21 1130          Stairs Within Home, Primary    Number of Stairs, Within Home, Primary  none  -MS     Row Name  "04/14/21 1130          Cognition    Orientation Status (Cognition)  oriented x 4  -MS       User Key  (r) = Recorded By, (t) = Taken By, (c) = Cosigned By    Initials Name Provider Type    MS Stella Hope, PT, DPT, NCS Physical Therapist        Mobility     Row Name 04/14/21 1130          Bed Mobility    Bed Mobility  bed mobility (all) activities  -MS     All Activities, [place] (Bed Mobility)  independent  -MS     Row Name 04/14/21 1130          Sit-Stand Transfer    Sit-Stand [place] (Transfers)  independent  -MS     Row Name 04/14/21 1130          Gait/Stairs (Locomotion)    [place] Level (Gait)  independent  -MS     Distance in Feet (Gait)  50ft in room in all directions no difficulties  -MS       User Key  (r) = Recorded By, (t) = Taken By, (c) = Cosigned By    Initials Name Provider Type    MS Jayy Stella HOOVER, PT, DPT, NCS Physical Therapist        Obj/Interventions     Row Name 04/14/21 1200          Range of Motion Comprehensive    General Range of Motion  no range of motion deficits identified  -MS     Row Name 04/14/21 1200          Strength Comprehensive (MMT)    Comment, General Manual Muscle Testing (MMT) Assessment  L LE grossly 4+/5, all others 5/5  -MS     Row Name 04/14/21 1200          Motor Skills    Motor Skills  coordination  -MS     Coordination  WNL;upper extremity;lower extremity  -MS     Row Name 04/14/21 1200          Balance    Balance Assessment  sitting static balance;standing static balance  -MS     Static Sitting Balance  WNL  -MS     Static Standing Balance  WNL  -MS     Comment, Balance  standing eyes closes feet together  -MS     Row Name 04/14/21 1200          Sensory Assessment (Somatosensory)    Sensory Assessment (Somatosensory)  -- pt reports \"numbness\" in the C 7 distribution that is chronic  -MS       User Key  (r) = Recorded By, (t) = Taken By, (c) = Cosigned By    Initials Name Provider Type    Stlela Cordero, PT, DPT, NCS Physical Therapist    "     Goals/Plan    No documentation.       Clinical Impression     Row Name 04/14/21 1130          Pain    Additional Documentation  Pain Scale: Numbers Pre/Post-Treatment (Group) pt reports chronic numbness in her UEs  -MS     Row Name 04/14/21 1130          Pain Scale: Numbers Pre/Post-Treatment    Pretreatment Pain Rating  0/10 - no pain  -MS     Posttreatment Pain Rating  0/10 - no pain  -MS     Row Name 04/14/21 1130          Plan of Care Review    Plan of Care Reviewed With  patient  -MS     Progress  improving  -MS     Outcome Summary  PT evaluation completed. The patient presents alert and oriented x4. She demonstrates slight L leg weakness at 4+/5 and she reports numbness in the C7 dermatome B that is chronic. Otherwise she has no focal neurological deficits in strength, sensation, or coordiantion. She is steady during gait and has no further needs for PT at this time. Her O2 saturation is on the low side when on room air and even when fully awake. She hovers around 90%. She reports no breathing difficulty.  -MS     Row Name 04/14/21 1130          Therapy Assessment/Plan (PT)    Criteria for Skilled Interventions Met (PT)  no problems identified which require skilled intervention  -MS     Row Name 04/14/21 1130          Vital Signs    Pre SpO2 (%)  96  -MS     O2 Delivery Pre Treatment  supplemental O2 2L  -MS     Intra SpO2 (%)  90  -MS     O2 Delivery Intra Treatment  room air  -MS     Post SpO2 (%)  91  -MS     O2 Delivery Post Treatment  room air  -MS     Pre Patient Position  Supine  -MS     Intra Patient Position  Sitting  -MS     Post Patient Position  Sitting  -MS     Row Name 04/14/21 1130          Positioning and Restraints    Post Treatment Position  chair  -MS     In Chair  notified nsg;sitting;call light within reach;encouraged to call for assist  -MS       User Key  (r) = Recorded By, (t) = Taken By, (c) = Cosigned By    Initials Name Provider Type    Stella Cordero R, PT, DPT, NCS Physical  Therapist        Outcome Measures     Row Name 04/14/21 1204          Modified Blackfoot Scale    Pre-Stroke Modified Elva Scale  0 - No Symptoms at all.  -MS     Modified Blackfoot Scale  0 - No Symptoms at all.  -MS     Row Name 04/14/21 1204          Functional Assessment    Outcome Measure Options  Modified Elva  -MS       User Key  (r) = Recorded By, (t) = Taken By, (c) = Cosigned By    Initials Name Provider Type    Stella Cordero KIKO, PT, DPT, NCS Physical Therapist          PT Recommendation and Plan     Plan of Care Reviewed With: patient  Progress: improving  Outcome Summary: PT evaluation completed. The patient presents alert and oriented x4. She demonstrates slight L leg weakness at 4+/5 and she reports numbness in the C7 dermatome B that is chronic. Otherwise she has no focal neurological deficits in strength, sensation, or coordiantion. She is steady during gait and has no further needs for PT at this time. Her O2 saturation is on the low side when on room air and even when fully awake. She hovers around 90%. She reports no breathing difficulty.     Time Calculation:   PT Charges     Row Name 04/14/21 1130             Time Calculation    Start Time  1130  -MS      Stop Time  1200  -MS      Time Calculation (min)  30 min  -MS      PT Received On  04/14/21  -MS        User Key  (r) = Recorded By, (t) = Taken By, (c) = Cosigned By    Initials Name Provider Type    MS Hope Stella HOOVER, PT, DPT, NCS Physical Therapist        Therapy Charges for Today     Code Description Service Date Service Provider Modifiers Qty    [id number]  PT EVAL LOW COMPLEXITY 3 4/14/2021 Stella Hope PT, DPT, NCS GP 1          PT G-Codes  Outcome Measure Options: Modified Elva  Modified Blackfoot Scale: 0 - No Symptoms at all.    PT Discharge Summary  Anticipated Discharge Disposition (PT): home with assist    Stella Hope PT, [id number], NCS  4/14/2021

## 2021-04-14 NOTE — PLAN OF CARE
Goal Outcome Evaluation:  Plan of Care Reviewed With: patient  Progress: improving  Outcome Summary: Upon arrival to floor NIH=2.  Pt stated sensation in her right arm was duller than her left and she had slightly slurred speech.  Upon recheck 4 hours later her NIH=0.  Pt has wound on top of left foot.  Also has a scab on left buttock.  No mobility issues.  Gait steady.  NPO until SLP sees.

## 2021-04-14 NOTE — PROGRESS NOTES
Discharge Planning Assessment  [facility]     Patient Name: Loren Doll  MRN: [id number]  Today's Date: 4/14/2021    Admit Date: 4/13/2021    Discharge Needs Assessment     Row Name 04/14/21 1219       Living Environment    Current Living Arrangements  home/apartment/condo    Primary Care Provided by  self    Provides Primary Care For  no one    Family Caregiver if Needed  child(eunice), adult    Quality of Family Relationships  supportive    Able to Return to Prior Arrangements  yes       Resource/Environmental Concerns    Resource/Environmental Concerns  none    Transportation Concerns  car, none       Transition Planning    Patient/Family Anticipates Transition to  home    Patient/Family Anticipated Services at Transition  none    Transportation Anticipated  family or friend will provide       Discharge Needs Assessment    Readmission Within the Last 30 Days  no previous admission in last 30 days    Concerns to be Addressed  denies needs/concerns at this time    Anticipated Changes Related to Illness  none    Equipment Needed After Discharge  none    Discharge Coordination/Progress  PT resides at home and was independent prior to admission. PT plans to dc home and is unaware of any dc needs at this time. Will follow and assist as needed.        Discharge Plan    No documentation.       Continued Care and Services - Admitted Since 4/13/2021    Coordination has not been started for this encounter.         Demographic Summary    No documentation.       Functional Status    No documentation.       Psychosocial    No documentation.       Abuse/Neglect    No documentation.       Legal    No documentation.       Substance Abuse    No documentation.       Patient Forms    No documentation.           KARIN Turner

## 2021-04-14 NOTE — PAYOR COMM NOTE
"ADMIT INPT 4-13-21  UR [id number] [phone]    Chuy Doll (61 y.o. Female)     Date of Birth Social Security Number Address Home Phone MRN    1960  [id number] [place] [id number] [phone] [id number]    Orthodox Marital Status          Protestant        Admission Date Admission Type Admitting Provider Attending Provider Department, Room/Bed    4/13/21 Emergency Yusuf Keene MD Robinson, Maurice S, DO [facility] 3A, 325/1    Discharge Date Discharge Disposition Discharge Destination                       Attending Provider: Ang Acosta DO    Allergies: No Known Allergies    Isolation: None   Infection: None   Code Status: CPR    Ht: 152.4 cm (60\")   Wt: 89 kg (196 lb 3.2 oz)    Admission Cmt: None   Principal Problem: TIA (transient ischemic attack) [G45.9]                 Active Insurance as of 4/13/2021     Primary Coverage     Payor Plan Insurance Group Employer/Plan Group    MEDICARE MEDICARE A & B      Payor Plan Address Payor Plan Phone Number Payor Plan Fax Number Effective Dates    [address] [phone]  4/1/2013 - None Entered    [facility] [id number]       Subscriber Name Subscriber Birth Date Member ID       CHUY DOLL 1960 [id number]           Secondary Coverage     Payor Plan Insurance Group Employer/Plan Group    [employer]ICA[place]      Payor Plan Address Payor Plan Phone Number Payor Plan Fax Number Effective Dates    [address] [address] [phone]  4/13/2021 - None Entered    [facility] [id number]       Subscriber Name Subscriber Birth Date Member ID       CHUY DOLL 1960 [id number]                 Emergency Contacts      (Rel.) Home Phone Work Phone Mobile Phone    CRISTINE NGUYỄN (Daughter) -- -- [phone]               History & Physical      Ria Staples, APRN at 04/13/21 2051              [facility] Medicine Services  HISTORY AND " PHYSICAL    Date of Admission: 2021  Primary Care Physician: Nolan Hoffman Jr., MD    Subjective     Chief Complaint: TIA    History of Present Illness  Loren Doll is a 61-year-old female with a history of stroke, coronary artery disease, hyperlipidemia, hypertension, tobacco dependence, peripheral vascular disease, ventricular fibrillation last known well approximately 5 PM today.  She presented to [facility] emergency department via EMTs with right facial droop, slurred speech, code stroke was called.  Patient was evaluated by neurology.  Patient did take CBD candy approximately 11 AM she reported to neurology that she threw them up, dizziness, was not feeling well followed with a nap around 2 PM.  Patient went to have function and had neurological changes.  EMT were called.  By the time neurology arrived patient reported symptoms had resolved.  Neurology wishes for patient to be admitted for TIA work-up.  Patient declines NicoDerm patch as she states it causes her to have a dizziness.  Laboratory studies revealed elevated alkaline phosphatase of 176, urine drug screen positive for marijuana and tricyclic antidepressant.  Neurological studies without acute findings.  She is admitted for further evaluation treatment.    Review of Systems   A 10 point review of systems was completed, all negative except for those discussed in HPI    Past Medical History:   Past Medical History:   Diagnosis Date   • Coronary artery disease    • Hyperlipidemia    • Hypertension    • Myocardial infarction (CMS/HCC)    • Peripheral vascular disease (CMS/HCC)    • Stroke (CMS/HCC)    • Tobacco dependence    • VF (ventricular fibrillation) (CMS/HCC)        Past Surgical History:   Past Surgical History:   Procedure Laterality Date   •  SECTION     • CHOLECYSTECTOMY         Family History: family history includes COPD in her mother; Heart disease in her father.    Social History:  reports that she  "has been smoking cigarettes. She has been smoking about 1.50 packs per day. She does not have any smokeless tobacco history on file. She reports previous alcohol use. She reports current drug use.    Code Status: Full, if unable speak for herself her daughter Bharti will speak for her      Allergies:  No Known Allergies    Medications:  Prior to Admission medications    Medication Sig Start Date End Date Taking? Authorizing Provider   aspirin 325 MG tablet Take 325 mg by mouth Daily.   Yes Jodee Taylor MD   atenolol (TENORMIN) 50 MG tablet Take 50 mg by mouth Daily.   Yes Jodee Taylor MD   citalopram (CeleXA) 10 MG tablet Take 10 mg by mouth Daily.   Yes Jodee Taylor MD   cyclobenzaprine (FLEXERIL) 10 MG tablet Take 10 mg by mouth 2 (Two) Times a Day As Needed for Muscle Spasms.   Yes Jodee Taylor MD   Fluticasone Furoate-Vilanterol (Breo Ellipta) 100-25 MCG/INH inhaler Inhale 1 puff Daily.   Yes Jodee Taylor MD   HYDROcodone-acetaminophen (NORCO) 5-325 MG per tablet Take 1 tablet by mouth 2 (two) times a day.   Yes Jodee Taylor MD   meloxicam (MOBIC) 7.5 MG tablet Take 7.5 mg by mouth Daily.   Yes Jodee Taylor MD   nitroglycerin (NITRODUR) 0.6 MG/HR patch Place 1 patch on the skin as directed by provider Daily.   Yes Jodee Taylor MD   omeprazole (priLOSEC) 20 MG capsule Take 20 mg by mouth Daily.   Yes Jodee Taylor MD   Albuterol inhaler every 4 hours as needed for shortness of breathing  Neurontin 300 mg 3 times a day  Lipitor 20 mg daily    Objective     /53 (BP Location: Right arm, Patient Position: Lying)   Pulse 73   Temp 98 °F (36.7 °C) (Oral)   Resp 20   Ht 152.4 cm (60\")   Wt 89 kg (196 lb 3.2 oz)   SpO2 98%   BMI 38.32 kg/m²   Physical Exam  Vitals reviewed.   Constitutional:       Appearance: She is obese.   HENT:      Head: Normocephalic and atraumatic.      Mouth/Throat:      Mouth: Mucous membranes are dry.    "   Pharynx: Oropharynx is clear.   Eyes:      Extraocular Movements: Extraocular movements intact.      Pupils: Pupils are equal, round, and reactive to light.   Cardiovascular:      Rate and Rhythm: Normal rate and regular rhythm.   Pulmonary:      Effort: Pulmonary effort is normal.      Comments: Diminished throughout without adventitious breath sounds  Abdominal:      General: Bowel sounds are normal.      Palpations: Abdomen is soft.   Musculoskeletal:      Cervical back: Normal range of motion and neck supple.      Right lower leg: No edema.      Left lower leg: No edema.      Comments: Generalized weakness and debility   Skin:     General: Skin is warm and dry.   Neurological:      General: No focal deficit present.      Mental Status: She is alert and oriented to person, place, and time.   Psychiatric:         Mood and Affect: Mood normal.         Behavior: Behavior normal.         Pertinent Data:   Lab Results (last 72 hours)     Procedure Component Value Units Date/Time    COVID-19, ABBOTT IN-HOUSE,NASAL Swab (NO TRANSPORT MEDIA) 2 HR TAT - Swab, Nasopharynx [[id number]]  (Normal) Collected: 04/13/21 2058    Specimen: Swab from Nasopharynx Updated: 04/13/21 2126     COVID19 Presumptive Negative    Urine Drug Screen - Urine, Clean Catch [[id number]]  (Abnormal) Collected: 04/13/21 2022    Specimen: Urine, Clean Catch Updated: 04/13/21 2056     THC, Screen, Urine Positive     Phencyclidine (PCP), Urine Negative     Cocaine Screen, Urine Negative     Methamphetamine, Ur Negative     Opiate Screen Negative     Amphetamine Screen, Urine Negative     Benzodiazepine Screen, Urine Negative     Tricyclic Antidepressants Screen Positive     Methadone Screen, Urine Negative     Barbiturates Screen, Urine Negative     Oxycodone Screen, Urine Negative     Propoxyphene Screen Negative     Buprenorphine, Screen, Urine Negative    Urinalysis With Culture If Indicated - Urine, Clean Catch [[id number]]  (Abnormal) Collected:  04/13/21 2023    Specimen: Urine, Clean Catch Updated: 04/13/21 2032     Color, UA Yellow     Appearance, UA Clear     pH, UA 6.0     Specific Gravity, UA >1.030     Glucose, UA Negative     Ketones, UA Negative     Bilirubin, UA Negative     Blood, UA Negative     Protein, UA Negative     Leuk Esterase, UA Negative     Nitrite, UA Negative     Urobilinogen, UA 1.0 E.U./dL    Ethanol [[id number]] Collected: 04/13/21 1832    Specimen: Blood Updated: 04/13/21 1916     Ethanol % <0.010 %     Narrative:      Not for legal purposes. Chain of Custody not followed.     TSH [[id number]]  (Normal) Collected: 04/13/21 1832    Specimen: Blood Updated: 04/13/21 1914     TSH 0.813 uIU/mL     Comprehensive Metabolic Panel [[id number]]  (Abnormal) Collected: 04/13/21 1832    Specimen: Blood Updated: 04/13/21 1909     Glucose 102 mg/dL      BUN 17 mg/dL      Creatinine 0.68 mg/dL      Sodium 144 mmol/L      Potassium 4.5 mmol/L      Chloride 105 mmol/L      CO2 30.0 mmol/L      Calcium 8.9 mg/dL      Total Protein 6.7 g/dL      Albumin 4.00 g/dL      ALT (SGPT) 22 U/L      AST (SGOT) 16 U/L      Alkaline Phosphatase 176 U/L      Total Bilirubin 0.2 mg/dL      eGFR Non African Amer 88 mL/min/1.73      eGFR  African Amer 106 mL/min/1.73      Globulin 2.7 gm/dL      A/G Ratio 1.5 g/dL      BUN/Creatinine Ratio 25.0     Anion Gap 9.0 mmol/L     Protime-INR [[id number]]  (Normal) Collected: 04/13/21 1832    Specimen: Blood Updated: 04/13/21 1858     Protime 12.9 Seconds      INR 1.01    CBC Auto Differential [[id number]]  (Abnormal) Collected: 04/13/21 1832    Specimen: Blood Updated: 04/13/21 1849     WBC 6.95 10*3/mm3      RBC 5.01 10*6/mm3      Hemoglobin 13.2 g/dL      Hematocrit 41.7 %      MCV 83.2 fL      MCH 26.3 pg      MCHC 31.7 g/dL      RDW 15.2 %      RDW-SD 45.6 fl      MPV 9.3 fL      Platelets 280 10*3/mm3      Neutrophil % 59.7 %      Lymphocyte % 32.7 %      Monocyte % 6.6 %      Eosinophil % 0.1 %      Basophil % 0.6 %       Immature Grans % 0.3 %      Neutrophils, Absolute 4.15 10*3/mm3      Lymphocytes, Absolute 2.27 10*3/mm3      Monocytes, Absolute 0.46 10*3/mm3      Eosinophils, Absolute 0.01 10*3/mm3      Basophils, Absolute 0.04 10*3/mm3      Immature Grans, Absolute 0.02 10*3/mm3      nRBC 0.0 /100 WBC         Imaging Results (Last 24 Hours)     Procedure Component Value Units Date/Time    XR Chest 1 View [[id number]] Collected: 04/13/21 2015     Updated: 04/13/21 2019    Narrative:      EXAMINATION: Chest 1 view 4/13/2021     HISTORY: Stroke protocol     FINDINGS: Upright frontal projection of chest demonstrates a right  basilar granuloma as well as calcified right perihilar lymph nodes.  Lungs are otherwise clear. There is no effusion or free air present.       Impression:      1. No acute disease.  This report was finalized on 04/13/2021 20:16 by Dr. Teodoro Mays MD.    CT Angiogram Head [[id number]] Collected: 04/13/21 2004     Updated: 04/13/21 2016    Narrative:      EXAMINATION: CT angiogram of the head 4/13/2021     DOSE: 182.5 mGycm. Automated exposure control was utilized to diminish  patient radiation dose..     HISTORY: Weakness now resolving.     FINDINGS: CT angiography was performed of the [place] of [place] following  intravenous contrast administration with reformatted images obtained in  the sagittal and coronal projections from the original data set as well  as three-dimensional reconstructions. MIPS are also obtained.     There is mild calcific plaquing involving the distal extracranial aspect  of both vertebral arteries and the proximal intracranial aspect of the  right vertebral artery without evidence of rate limiting stenosis. The  right vertebral artery is dominant.     The posterior inferior cerebellar arteries are patent. The basilar  artery is normal in caliber. The superior cerebellar arteries and  posterior cerebral arteries are widely patent. There is a persistent  fetal origin of the left  posterior cerebral artery.     Both distal ICAs are widely patent. There is calcific plaquing involving  the cavernous segment of both ICAs as well as calcific plaquing  involving the supraclinoid aspect of both ICAs with mild associated  stenosis. The anterior and middle cerebral arteries are normal in  caliber without evidence of focal stenosis or discrete berry aneurysm.     There is no evidence of dural venous sinus thrombosis.       Impression:      1.. Mild calcific plaquing involving the distal extracranial aspect of  both vertebral arteries and the proximal intracranial aspect of the  right vertebral artery without evidence of rate limiting stenosis.  2. Persistent fetal origin of the left posterior cerebral artery. There  is also a P1 segment on the left contributing to the left posterior  cerebral circulation. The posterior circulation is otherwise  unremarkable.  3. Mild calcific plaquing involving the cavernous and supraclinoid  aspect of both ICAs with mild associated stenosis. The anterior and  middle cerebral arteries demonstrate normal enhancement with no evidence  of intraluminal thrombus, focal steno-occlusive disease or discrete  berry aneurysm.  4. Normal enhancement of the dural venous sinuses with no evidence of  dural venous sinus thrombosis.  This report was finalized on 04/13/2021 20:13 by Dr. Teodoro Mays MD.    CT Angiogram Neck [[id number]] Collected: 04/13/21 1931     Updated: 04/13/21 1947    Narrative:      EXAMINATION: CT angiogram of the neck 4/13/2021     HISTORY: Code stroke.     DOSE: 182.5 mGycm. Automated exposure control was utilized to diminish  patient radiation dose..     FINDINGS: Multiple contiguous axials are obtained from the aortic arch  through the skull base at 1 mm intervals following intravenous contrast  infusion with reformatted images obtained in the sagittal and coronal  projections from the original data set as well as MIPS.     There are changes of  centrilobular emphysema within the lung apices.  There is atheromatous calcification of the aortic arch. The origin of  the great vessels are unremarkable. The right vertebral artery is  dominant.     There is diffuse enlargement of the thyroid gland with heterogeneity  within the right lobe suggesting a nodule measuring approximately 1.3 cm  in size. This may be related to a multinodular goiter. The level of the  true and false cords is unremarkable. No enlarged cervical chain or  posterior triangle lymphadenopathy is demonstrated. The nasopharynx and  lateral parapharyngeal spaces are symmetric with no mass or mass effect.  Visualized orbits as well as the paranasal sinuses and mastoid air cells  are normally aerated.     As previously noted the right vertebral artery is dominant. There is  mild calcific plaquing at the distal extracranial aspect of the left  vertebral artery and right vertebral artery as well as within the  proximal intracranial aspect of the right vertebral artery without  evidence of rate limiting stenosis.     Examination of the right common carotid artery demonstrates no focal  stenosis or plaquing to the level of the carotid bifurcation. There is  mixed plaquing involving the carotid bulb and proximal ICA without  evidence of a hemodynamically significant stenosis utilizing NASCET  criteria.     Examination of the left common carotid artery demonstrates mild  scattered calcific plaquing involving the mid segment of the CCA with an  approximate 25% cross-sectional stenosis within the distal CCA just  proximal to the carotid bifurcation. There is mixed plaquing involving  the carotid bulb and extending into the proximal aspect of the left ICA  resulting in a 31% cross-sectional stenosis based on NASCET criteria.  The more distal ICAs are widely patent.       Impression:      1.. There is mild calcific plaquing of the aortic arch. The origin of  the great vessels are unremarkable.  2. The right  vertebral artery is dominant. There is mild calcific  plaquing involving the proximal intracranial aspect of the right  vertebral artery as well as the distal extracranial aspect of both  vertebral arteries without evidence of associated rate limiting  stenosis.  3. There is mixed plaquing involving the right carotid bulb and proximal  ICA. Based on NASCET criteria there is no significant stenosis. The more  distal right ICA is widely patent. On the left there is scattered  plaquing involving the mid and distal left CCA with a mild stenosis of  approximately 25% just below the left carotid bifurcation. There is  mixed plaquing involving the carotid bulb and proximal aspect of the  left ICA with an approximate 31% cross-sectional stenosis within the  proximal left ICA. The more distal left ICA is widely patent.  This report was finalized on 04/13/2021 19:44 by Dr. Teodoro Mays MD.    CT CEREBRAL PERFUSION WITH & WITHOUT CONTRAST [[id number]] Collected: 04/13/21 1908     Updated: 04/13/21 1913    Narrative:      Indication: Slurred speech. Weakness now resolving.     Exam:      1. Perfusion CT is performed to acquire images tracking the temporal  course of iodinated contrast material passing through the cerebral  circulation. Perfusion parameters, such as cerebral blood flow (CBF),  cerebral blood volume (CBV), mean transit time (MTT), etc. are  calculated by [employer] with additional provided perfusion maps and  estimated stroke volumes.  2. Automated exposure control (AEC) protocols are utilized on the  scanner to ensure dose lowered technique.      Comparison: Noncontrast CT brain and brain angiogram dated 4/13/2021  6:47 PM CDT     Findings:     Normal, symmetric and MTT, TTP, CBV and CBF.      Distribution: No perfusion abnormality is demonstrated..     Tmax >6.0 seconds volume: 0 ml     CBF < 30% volume: 0 ml     Mismatch volume: 0 ml      Mismatch ratio: None       Impression:      Impression:  1. Normal,  symmetric cerebral perfusion. No discrete infarct detected by  the perfusion software.  This report was finalized on 04/13/2021 19:10 by Dr. Teodoro Mays MD.    CT Head Without Contrast Stroke Protocol [[id number]] Collected: 04/13/21 1831     Updated: 04/13/21 1837    Narrative:      EXAMINATION: CT head without contrast could stroke protocol for 13 2021     DOSE: 1262 mGycm. Automated exposure control was utilized to diminish  patient radiation dose..     HISTORY: Transient difficulty with motion.     FINDINGS: Multiple contiguous axials are obtained from the skull base to  the vertex per protocol without intravenous contrast-enhancement with  reformatted images obtained in the sagittal and coronal projections from  the original data set.     There is evidence of a remote infarct involving the right basal ganglia  with encephalomalacia. There is no evidence of mass, mass effect or  shift of the midline. No evidence of acute infarct or hemorrhage.     Visualized paranasal sinuses and mastoid air cells are normally aerated.  No acute calvarial abnormalities are present.       Impression:      1.. Remote right basal ganglia infarct with focal encephalomalacia.  2. Otherwise unremarkable nonenhanced CT of the brain.  3. The code stroke report was phoned to Dr. Stoner in the emergency  room at 6:31 PM.  This report was finalized on 04/13/2021 18:34 by Dr. Teodoro Mays MD.        I have personally reviewed and interpreted the radiology studies and ECG obtained at time of admission.     Assessment / Plan     Assessment:   Active Hospital Problems    Diagnosis    • **TIA (transient ischemic attack)    • History of stroke, right basal ganglia    • Coronary artery disease involving native coronary artery of native heart    • Hyperlipidemia    • Essential hypertension        Plan:   1.  Admit as observation  2.  Follow TIA/stroke protocol  3.  MRI of the brain without contrast, lateral ultrasound carotids and  "echocardiogram in a.m.  4.  Home medications reviewed and restarted as appropriate  5.  DVT prophylaxis with SCDs  6.  Labs in a.m.  7.  Incentive spirometry, supplemental oxygen, continuous pulse oximetry, duo nebs  8.  Neurology saw in the emergency department will follow    I discussed the patient's findings and my recommendations with: Yusuf Keene MD  Time spent: 35 minutes    Patient seen and examined by me on 4/13/2021 at 8:50 PM.    Electronically signed by BEN Blandon, 04/13/21, 22:48 CDT.    Electronically signed by Ria Staples APRN at 04/13/21 2248          Emergency Department Notes      Judie Lott, RN at 04/13/21 1900        Dr Valentine Mobley at [facility]       Judie Lott RN  04/13/21 1916      Electronically signed by Judie Lott, RN at 04/13/21 1916     Judie Lott, RN at 04/13/21 1902        Patient states that at 11 this morning she took a CBD gummy and has not felt right since. Patient states that she felt nauseated and felt like she was drunk.      Judie Lott RN  04/13/21 1903      Electronically signed by Judie Lott RN at 04/13/21 1903     Judie Lott, RN at 04/13/21 1915        No TPA given at this time.     Judie Lott RN  04/13/21 1915      Electronically signed by Judie Lott, RN at 04/13/21 1915     Seth Stoner MD at 04/13/21 2021      Procedure Orders    1. ECG 12 Lead [[id number]] ordered by Seth Stoner MD               Subjective   61-year-old female presents to the ER with concern for stroke.  Patient was last seen normal approximately 1 hour prior to arrival.  She does have a history of previous stroke, hypertension, hyperlipidemia, coronary disease, COPD, ongoing tobacco abuse.  Patient also admits to marijuana use, did use marijuana today.  About 1 hour prior to arrival to the hospital, patient developed some slurred speech, right-sided facial droop, mild right arm weakness and was \"listing\" to the right.  Family " called 911.  When paramedics arrived on scene they did not notice any right arm weakness but they did feel that she had some facial droop and slurred speech.  But time patient got to the emergency department, her symptoms had resolved.  Questionable mild slurred speech versus dysarthria from being under the influence of marijuana.  She had no facial droop and no upper or lower extremity weakness.  NIH on arrival was 1.      History provided by:  Patient      Review of Systems   All other systems reviewed and are negative.      Past Medical History:   Diagnosis Date   • Stroke (CMS/[facility])        No Known Allergies    History reviewed. No pertinent surgical history.    History reviewed. No pertinent family history.    Social History     Socioeconomic History   • Marital status:      Spouse name: Not on file   • Number of children: Not on file   • Years of education: Not on file   • Highest education level: Not on file   Tobacco Use   • Smoking status: Current Every Day Smoker     Packs/day: 1.50     Types: Cigarettes   Substance and Sexual Activity   • Alcohol use: Not Currently   • Drug use: Yes           Objective   Physical Exam  Vitals and nursing note reviewed.   Constitutional:       General: She is not in acute distress.     Appearance: Normal appearance. She is normal weight.   HENT:      Head: Normocephalic and atraumatic.      Nose: Nose normal.      Mouth/Throat:      Mouth: Mucous membranes are moist.      Pharynx: Oropharynx is clear. No oropharyngeal exudate or posterior oropharyngeal erythema.   Eyes:      Extraocular Movements: Extraocular movements intact.      Conjunctiva/sclera: Conjunctivae normal.      Pupils: Pupils are equal, round, and reactive to light.   Cardiovascular:      Rate and Rhythm: Normal rate and regular rhythm.      Pulses: Normal pulses.      Heart sounds: Normal heart sounds.   Pulmonary:      Effort: Pulmonary effort is normal.      Breath sounds: Normal breath sounds. No  wheezing, rhonchi or rales.   Abdominal:      General: Abdomen is flat. Bowel sounds are normal. There is no distension.      Palpations: Abdomen is soft.      Tenderness: There is no abdominal tenderness.   Musculoskeletal:         General: No tenderness. Normal range of motion.      Cervical back: Normal range of motion and neck supple. No rigidity. No muscular tenderness.      Right lower leg: No edema.      Left lower leg: No edema.   Skin:     General: Skin is warm and dry.      Capillary Refill: Capillary refill takes less than 2 seconds.      Findings: No rash.   Neurological:      General: No focal deficit present.      Mental Status: She is alert and oriented to person, place, and time. Mental status is at baseline.      Cranial Nerves: No cranial nerve deficit.      Sensory: No sensory deficit.      Motor: No weakness.   Psychiatric:         Mood and Affect: Mood normal.         Behavior: Behavior normal.         Thought Content: Thought content normal.         ECG 12 Lead      Date/Time: 4/13/2021 7:19 PM  Performed by: Seth Stoner MD  Authorized by: Seth Stoner MD   Interpreted by physician  Comments: Sinus rhythm with occasional PVC, LAE, low voltage QRS, no acute ischemic changes, abnormal EKG                ED Course  ED Course as of Apr 13 2105   Tue Apr 13, 2021 2027 59-year-old female with history of previous stroke, coronary disease, hypertension, hyperlipidemia, COPD and ongoing tobacco abuse who developed sudden onset slurred speech, facial droop, right arm weakness about an hour prior to arrival.    Patient symptoms had improved about 10-second to the ER NIH was 1, had mild dysarthria.  Although the patient did not seem to be a candidate for TPA at this point time, there was some concerned about possible stuttering TIA and patient may be even had intermittent large vessel occlusion so we did order CT of the head, CTA head neck and CT with perfusion.    Consulted  neurology, by the time they got to the emergency department evaluate the patient symptoms completely resolved and her NIH was 0.  Given her history, they did recommend admission for inpatient TIA work-up.    [AW]   2105 Repeat NIH at 2030 0    [AW]      ED Course User Index  [AW] Seth Stoner MD                                         NIHSS (NIH Stroke Scale/Score) reviewed and/or performed as part of the patient evaluation and treatment planning process.  The result associated with this review/performance is: 1       MDM  Number of Diagnoses or Management Options  TIA (transient ischemic attack): new and requires workup     Amount and/or Complexity of Data Reviewed  Clinical lab tests: reviewed  Tests in the radiology section of CPT®: reviewed  Tests in the medicine section of CPT®: reviewed    Risk of Complications, Morbidity, and/or Mortality  Presenting problems: high  Diagnostic procedures: high  Management options: high    Patient Progress  Patient progress: stable      Final diagnoses:   TIA (transient ischemic attack)       ED Disposition  ED Disposition     ED Disposition Condition Comment    Decision to Admit  Level of Care: Med/Surg [1]   Diagnosis: TIA (transient ischemic attack) [[id number]]   Admitting Physician: GRAY HOLLOWAY [[id number]]   Attending Physician: GRAY HOLLOWAY [[id number]]            No follow-up provider specified.       Medication List      No changes were made to your prescriptions during this visit.          Seth Stoner MD  04/13/21 2106      Electronically signed by Seth Stoner MD at 04/13/21 2106     Haris Hedrick RN at 04/13/21 2127        Pt placed on 2 L NC due to O2 sats 90%     Haris Hedrick RN  04/13/21 2128      Electronically signed by Haris Hedrick RN at 04/13/21 2128     Haris Hedrick RN at 04/13/21 2139        Attempted to call report. No answer     Haris Hedrick RN  04/13/21 2140      Electronically signed by  Haris Hedrick, RN at 04/13/21 2140     Haris Hedrick, RN at 04/13/21 2140        Talked to valeriy. Said they would call back when available for report     Haris Hedrick RN  04/13/21 2141      Electronically signed by Haris Hedrick, RN at 04/13/21 2141     Haris Hedrick, RN at 04/13/21 2157        Report given to Haris Zamudio RN, RN  04/13/21 2157      Electronically signed by Haris Hedrick, RN at 04/13/21 2157       Vital Signs (last 2 days)     Date/Time   Temp   Temp src   Pulse   Resp   BP   Patient Position   SpO2    04/14/21 1027   --   --   67   18   --   --   --    04/14/21 1022   --   --   68   18   --   --   95    04/14/21 0735   98.1 (36.7)   Oral   68   18   117/60   Lying   93    04/14/21 0620   97.4 (36.3)   Oral   69   18   103/57   Lying   94    04/14/21 0618   --   --   65   18   --   --   --    04/14/21 0611   --   --   64   18   --   --   94    04/13/21 2348   97.7 (36.5)   Oral   74   18   104/47   Lying   97    04/13/21 2232   98 (36.7)   Oral   73   20   124/53   Lying   98    04/13/21 2130   --   --   68   --   113/59   --   94    04/13/21 2128   --   --   72   --   --   --   94    04/13/21 2123   --   --   71   --   --   --   90    04/13/21 2100   --   --   --   --   116/67   --   --    04/13/21 1930   --   --   75   --   131/72   --   93    04/13/21 1900   --   --   78   --   120/61   --   96    04/13/21 1853   97.6 (36.4)   Oral   82   20   129/59   Sitting   94            Oxygen Therapy (last 2 days)     Date/Time   SpO2   Device (Oxygen Therapy)   Flow (L/min)   Oxygen Concentration (%)   ETCO2 (mmHg)    04/14/21 1022   95   nasal cannula   2   --   --    04/14/21 0735   93   nasal cannula   2   --   --    04/14/21 0620   94   nasal cannula   2   --   --    04/14/21 0611   94   nasal cannula   2   --   --    04/13/21 2348   97   nasal cannula   2   --   --    04/13/21 2232   98   nasal cannula   2   --   --    04/13/21 2130   94   --   --   --   --     04/13/21 2128   94   --   --   --   --    04/13/21 2123   90   --   --   --   --    04/13/21 1930   93   --   --   --   --    04/13/21 1900   96   --   --   --   --    04/13/21 1853   94   --   --   --   --            Intake & Output (last 2 days)     None          Facility-Administered Medications as of 4/14/2021   Medication Dose Route Frequency Provider Last Rate Last Admin   • acetaminophen (TYLENOL) tablet 650 mg  650 mg Oral Q4H PRN Ria Staples, APRVISH        Or   • acetaminophen (TYLENOL) 160 MG/5ML solution 650 mg  650 mg Oral Q4H PRN Ria Staples APRN        Or   • acetaminophen (TYLENOL) suppository 650 mg  650 mg Rectal Q4H PRN Ria Staples, APRN       • atenolol (TENORMIN) tablet 50 mg  50 mg Oral Daily Ria Staples, APRN   50 mg at 04/14/21 1009   • atorvastatin (LIPITOR) tablet 80 mg  80 mg Oral Nightly Ria Staples, APRN       • citalopram (CeleXA) tablet 10 mg  10 mg Oral Daily Ria Staples APRN   10 mg at 04/14/21 1009   • clopidogrel (PLAVIX) tablet 75 mg  75 mg Oral Daily Bijal Pineda, APRN       • gabapentin (NEURONTIN) capsule 300 mg  300 mg Oral TID Ria Staples APRN   300 mg at 04/14/21 1009   • [COMPLETED] iopamidol (ISOVUE-370) 76 % injection 125 mL  125 mL Intravenous Once in imaging Seth Stoner MD   80 mL at 04/13/21 1838   • ipratropium-albuterol (DUO-NEB) nebulizer solution 3 mL  3 mL Nebulization 4x Daily - RT Ria Staples APRN   3 mL at 04/14/21 1022   • meloxicam (MOBIC) tablet 7.5 mg  7.5 mg Oral Daily Ria Staples, APRN   7.5 mg at 04/14/21 1009   • nitroglycerin (NITRODUR) 0.2 MG/HR patch 1 patch  1 patch Transdermal Daily Ria Staples APRN   1 patch at 04/14/21 1010    And   • nitroglycerin (NITRODUR) 0.4 MG/HR patch 1 patch  1 patch Transdermal Daily Ria Staples APRN       • ondansetron (ZOFRAN) tablet 4 mg  4 mg Oral Q6H PRN Ria Staples APRN        Or   • ondansetron (ZOFRAN) injection 4 mg  4 mg  Intravenous Q6H PRN Ria Staples, APRN       • sodium chloride 0.9 % flush 10 mL  10 mL Intravenous PRN Ria Staples, APRN       • sodium chloride 0.9 % flush 10 mL  10 mL Intravenous Q12H Ria Staples, APRN   10 mL at 04/14/21 1014   • sodium chloride 0.9 % flush 10 mL  10 mL Intravenous PRN Ria Staples, APRN         Lab Results (last 48 hours)     Procedure Component Value Units Date/Time    Hemoglobin A1c [[id number]]  (Abnormal) Collected: 04/14/21 0621    Specimen: Blood Updated: 04/14/21 0705     Hemoglobin A1C 6.40 %     Narrative:      Hemoglobin A1C Ranges:    Increased Risk for Diabetes  5.7% to 6.4%  Diabetes                     >= 6.5%  Diabetic Goal                < 7.0%    Lipid Panel [[id number]]  (Abnormal) Collected: 04/14/21 0621    Specimen: Blood Updated: 04/14/21 0658     Total Cholesterol 111 mg/dL      Triglycerides 122 mg/dL      HDL Cholesterol 25 mg/dL      LDL Cholesterol  64 mg/dL      VLDL Cholesterol 22 mg/dL      LDL/HDL Ratio 2.46    Narrative:      Cholesterol Reference Ranges  (U.S. Department of Health and Human Services ATP III Classifications)    Desirable          <200 mg/dL  Borderline High    200-239 mg/dL  High Risk          >240 mg/dL      Triglyceride Reference Ranges  (U.S. Department of Health and Human Services ATP III Classifications)    Normal           <150 mg/dL  Borderline High  150-199 mg/dL  High             200-499 mg/dL  Very High        >500 mg/dL    HDL Reference Ranges  (U.S. Department of Health and Human Services ATP III Classifcations)    Low     <40 mg/dl (major risk factor for CHD)  High    >60 mg/dl ('negative' risk factor for CHD)        LDL Reference Ranges  (U.S. Department of Health and Human Services ATP III Classifcations)    Optimal          <100 mg/dL  Near Optimal     100-129 mg/dL  Borderline High  130-159 mg/dL  High             160-189 mg/dL  Very High        >189 mg/dL    Comprehensive Metabolic Panel [[id number]]   (Abnormal) Collected: 04/14/21 0621    Specimen: Blood Updated: 04/14/21 0658     Glucose 91 mg/dL      BUN 14 mg/dL      Creatinine 0.56 mg/dL      Sodium 145 mmol/L      Potassium 4.1 mmol/L      Comment: Slight hemolysis detected by analyzer. Results may be affected.        Chloride 104 mmol/L      CO2 31.0 mmol/L      Calcium 8.8 mg/dL      Total Protein 6.6 g/dL      Albumin 3.80 g/dL      ALT (SGPT) 19 U/L      AST (SGOT) 17 U/L      Alkaline Phosphatase 160 U/L      Total Bilirubin 0.3 mg/dL      eGFR Non African Amer 110 mL/min/1.73      Globulin 2.8 gm/dL      A/G Ratio 1.4 g/dL      BUN/Creatinine Ratio 25.0     Anion Gap 10.0 mmol/L     Narrative:      GFR Normal >60  Chronic Kidney Disease <60  Kidney Failure <15      TSH [[id number]]  (Normal) Collected: 04/14/21 0621    Specimen: Blood Updated: 04/14/21 0658     TSH 0.763 uIU/mL     CBC (No Diff) [[id number]]  (Abnormal) Collected: 04/14/21 0621    Specimen: Blood Updated: 04/14/21 0629     WBC 5.57 10*3/mm3      RBC 5.07 10*6/mm3      Hemoglobin 13.4 g/dL      Hematocrit 42.2 %      MCV 83.2 fL      MCH 26.4 pg      MCHC 31.8 g/dL      RDW 15.3 %      RDW-SD 45.6 fl      MPV 9.2 fL      Platelets 258 10*3/mm3     Vitamin B12 [[id number]] Collected: 04/14/21 0621    Specimen: Blood Updated: 04/14/21 0626    POC Glucose Once [[id number]]  (Normal) Collected: 04/13/21 2344    Specimen: Blood Updated: 04/13/21 2358     Glucose 95 mg/dL      Comment: : [id number] [place] KristaMeter ID: [id number]       COVID-19, [facility] IN-HOUSE,NASAL Swab (NO TRANSPORT MEDIA) 2 HR TAT - Swab, Nasopharynx [[id number]]  (Normal) Collected: 04/13/21 2058    Specimen: Swab from Nasopharynx Updated: 04/13/21 2126     COVID19 Presumptive Negative    Narrative:      Fact sheet for providers: https://www.fda.gov/media/928375/download     Fact sheet for patients: https://www.fda.gov/media/027368/download    Test performed by PCR.  If inconsistent with clinical signs and symptoms  patient should be tested with different authorized molecular test.    Urine Drug Screen - Urine, Clean Catch [[id number]]  (Abnormal) Collected: 04/13/21 2022    Specimen: Urine, Clean Catch Updated: 04/13/21 2056     THC, Screen, Urine Positive     Phencyclidine (PCP), Urine Negative     Cocaine Screen, Urine Negative     Methamphetamine, Ur Negative     Opiate Screen Negative     Amphetamine Screen, Urine Negative     Benzodiazepine Screen, Urine Negative     Tricyclic Antidepressants Screen Positive     Methadone Screen, Urine Negative     Barbiturates Screen, Urine Negative     Oxycodone Screen, Urine Negative     Propoxyphene Screen Negative     Buprenorphine, Screen, Urine Negative    Narrative:      Cutoff For Drugs Screened:    Amphetamines               500 ng/ml  Barbiturates               200 ng/ml  Benzodiazepines            150 ng/ml  Cocaine                    150 ng/ml  Methadone                  200 ng/ml  Opiates                    100 ng/ml  Phencyclidine               25 ng/ml  THC                            50 ng/ml  Methamphetamine            500 ng/ml  Tricyclic Antidepressants  300 ng/ml  Oxycodone                  100 ng/ml  Propoxyphene               300 ng/ml  Buprenorphine               10 ng/ml    The normal value for all drugs tested is negative. This report includes unconfirmed screening results, with the cutoff values listed, to be used for medical treatment purposes only.  Unconfirmed results must not be used for non-medical purposes such as employment or legal testing.  Clinical consideration should be applied to any drug of abuse test, particularly when unconfirmed results are used.      Urinalysis With Culture If Indicated - Urine, Clean Catch [[id number]]  (Abnormal) Collected: 04/13/21 2023    Specimen: Urine, Clean Catch Updated: 04/13/21 2032     Color, UA Yellow     Appearance, UA Clear     pH, UA 6.0     Specific Gravity, UA >1.030     Glucose, UA Negative     Ketones, UA  Negative     Bilirubin, UA Negative     Blood, UA Negative     Protein, UA Negative     Leuk Esterase, UA Negative     Nitrite, UA Negative     Urobilinogen, UA 1.0 E.U./dL    Narrative:      Urine microscopic not indicated.    [facility] Draw [[id number]] Collected: 04/13/21 1832    Specimen: Blood Updated: 04/13/21 1945    Narrative:      The following orders were created for panel order [place] Draw.  Procedure                               Abnormality         Status                     ---------                               -----------         ------                     Light Blue Top[[id number]]                                   Final result               Green Top (Gel)[[id number]]                                  Final result               Lavender Top[[id number]]                                     Final result               Red Top[[id number]]                                          Final result                 Please view results for these tests on the individual orders.    Green Top (Gel) [[id number]] Collected: 04/13/21 1832    Specimen: Blood Updated: 04/13/21 1945     Extra Tube Hold for add-ons.     Comment: Auto resulted.       Red Top [[id number]] Collected: 04/13/21 1832    Specimen: Blood Updated: 04/13/21 1945     Extra Tube Hold for add-ons.     Comment: Auto resulted.       Light Blue Top [[id number]] Collected: 04/13/21 1832    Specimen: Blood Updated: 04/13/21 1945     Extra Tube hold for add-on     Comment: Auto resulted       Lavender Top [[id number]] Collected: 04/13/21 1832    Specimen: Blood Updated: 04/13/21 1945     Extra Tube hold for add-on     Comment: Auto resulted       Ethanol [[id number]] Collected: 04/13/21 1832    Specimen: Blood Updated: 04/13/21 1916     Ethanol % <0.010 %     Narrative:      Not for legal purposes. Chain of Custody not followed.     TSH [[id number]]  (Normal) Collected: 04/13/21 1832    Specimen: Blood Updated: 04/13/21 1914     TSH 0.813 uIU/mL     Comprehensive  Metabolic Panel [[id number]]  (Abnormal) Collected: 04/13/21 1832    Specimen: Blood Updated: 04/13/21 1909     Glucose 102 mg/dL      BUN 17 mg/dL      Creatinine 0.68 mg/dL      Sodium 144 mmol/L      Potassium 4.5 mmol/L      Chloride 105 mmol/L      CO2 30.0 mmol/L      Calcium 8.9 mg/dL      Total Protein 6.7 g/dL      Albumin 4.00 g/dL      ALT (SGPT) 22 U/L      AST (SGOT) 16 U/L      Alkaline Phosphatase 176 U/L      Total Bilirubin 0.2 mg/dL      eGFR Non African Amer 88 mL/min/1.73      eGFR  African Amer 106 mL/min/1.73      Globulin 2.7 gm/dL      A/G Ratio 1.5 g/dL      BUN/Creatinine Ratio 25.0     Anion Gap 9.0 mmol/L     Narrative:      GFR Normal >60  Chronic Kidney Disease <60  Kidney Failure <15      Protime-INR [[id number]]  (Normal) Collected: 04/13/21 1832    Specimen: Blood Updated: 04/13/21 1858     Protime 12.9 Seconds      INR 1.01    CBC & Differential [[id number]]  (Abnormal) Collected: 04/13/21 1832    Specimen: Blood Updated: 04/13/21 1849    Narrative:      The following orders were created for panel order CBC & Differential.  Procedure                               Abnormality         Status                     ---------                               -----------         ------                     CBC Auto Differential[[id number]]        Abnormal            Final result                 Please view results for these tests on the individual orders.    CBC Auto Differential [[id number]]  (Abnormal) Collected: 04/13/21 1832    Specimen: Blood Updated: 04/13/21 1849     WBC 6.95 10*3/mm3      RBC 5.01 10*6/mm3      Hemoglobin 13.2 g/dL      Hematocrit 41.7 %      MCV 83.2 fL      MCH 26.3 pg      MCHC 31.7 g/dL      RDW 15.2 %      RDW-SD 45.6 fl      MPV 9.3 fL      Platelets 280 10*3/mm3      Neutrophil % 59.7 %      Lymphocyte % 32.7 %      Monocyte % 6.6 %      Eosinophil % 0.1 %      Basophil % 0.6 %      Immature Grans % 0.3 %      Neutrophils, Absolute 4.15 10*3/mm3      Lymphocytes,  Absolute 2.27 10*3/mm3      Monocytes, Absolute 0.46 10*3/mm3      Eosinophils, Absolute 0.01 10*3/mm3      Basophils, Absolute 0.04 10*3/mm3      Immature Grans, Absolute 0.02 10*3/mm3      nRBC 0.0 /100 WBC           Imaging Results (Last 48 Hours)     Procedure Component Value Units Date/Time    MRI Brain Without Contrast [[id number]] Collected: 04/14/21 0857     Updated: 04/14/21 0908    Narrative:      EXAM: MR BRAIN WITHOUT IV CONTRAST 4/14/2021     COMPARISON: Head CT dated 4/13/2021      HISTORY: 61 years-old Female. Stroke      TECHNIQUE:   Routine pulse sequences of the brain were obtained without IV contrast.      REPORT:     Focus of diffusion restriction in the left occipital lobe, in an area  measuring 1 cm, consistent with acute lacunar infarct. There is no  evidence of hemorrhagic conversion.     Remote lacunar infarct in the right basal ganglia.     Mild chronic microvascular changes.     Mild generalized cerebral volume loss.     The flow-voids of [place] are maintained centrally.     The sella, parasellar region, brainstem and cerebellum demonstrate no  acute findings.     The basal cisterns are preserved.     The intraorbital structures demonstrate no acute findings.     Noncoalescent mastoid effusions, right greater than left.     The paranasal sinuses are free of obstructive disease.             Impression:      1.  Acute lacunar infarct in the left occipital lobe. No hemorrhagic  conversion.  2.  Remote right basal ganglia infarct.  3.  Mild chronic microvascular changes.  This report was finalized on 04/14/2021 09:05 by Dr. Jessica Arambula MD.    XR Chest 1 View [[id number]] Collected: 04/13/21 2015     Updated: 04/13/21 2019    Narrative:      EXAMINATION: Chest 1 view 4/13/2021     HISTORY: Stroke protocol     FINDINGS: Upright frontal projection of chest demonstrates a right  basilar granuloma as well as calcified right perihilar lymph nodes.  Lungs are otherwise clear. There is no  effusion or free air present.       Impression:      1. No acute disease.  This report was finalized on 04/13/2021 20:16 by Dr. Teodoro Mays MD.    CT Angiogram Head [[id number]] Collected: 04/13/21 2004     Updated: 04/13/21 2016    Narrative:      EXAMINATION: CT angiogram of the head 4/13/2021     DOSE: 182.5 mGycm. Automated exposure control was utilized to diminish  patient radiation dose..     HISTORY: Weakness now resolving.     FINDINGS: CT angiography was performed of the [place] following  intravenous contrast administration with reformatted images obtained in  the sagittal and coronal projections from the original data set as well  as three-dimensional reconstructions. MIPS are also obtained.     There is mild calcific plaquing involving the distal extracranial aspect  of both vertebral arteries and the proximal intracranial aspect of the  right vertebral artery without evidence of rate limiting stenosis. The  right vertebral artery is dominant.     The posterior inferior cerebellar arteries are patent. The basilar  artery is normal in caliber. The superior cerebellar arteries and  posterior cerebral arteries are widely patent. There is a persistent  fetal origin of the left posterior cerebral artery.     Both distal ICAs are widely patent. There is calcific plaquing involving  the cavernous segment of both ICAs as well as calcific plaquing  involving the supraclinoid aspect of both ICAs with mild associated  stenosis. The anterior and middle cerebral arteries are normal in  caliber without evidence of focal stenosis or discrete berry aneurysm.     There is no evidence of dural venous sinus thrombosis.       Impression:      1.. Mild calcific plaquing involving the distal extracranial aspect of  both vertebral arteries and the proximal intracranial aspect of the  right vertebral artery without evidence of rate limiting stenosis.  2. Persistent fetal origin of the left posterior cerebral  artery. There  is also a P1 segment on the left contributing to the left posterior  cerebral circulation. The posterior circulation is otherwise  unremarkable.  3. Mild calcific plaquing involving the cavernous and supraclinoid  aspect of both ICAs with mild associated stenosis. The anterior and  middle cerebral arteries demonstrate normal enhancement with no evidence  of intraluminal thrombus, focal steno-occlusive disease or discrete  berry aneurysm.  4. Normal enhancement of the dural venous sinuses with no evidence of  dural venous sinus thrombosis.  This report was finalized on 04/13/2021 20:13 by Dr. Teodoro Mays MD.    CT Angiogram Neck [[id number]] Collected: 04/13/21 1931     Updated: 04/13/21 1947    Narrative:      EXAMINATION: CT angiogram of the neck 4/13/2021     HISTORY: Code stroke.     DOSE: 182.5 mGycm. Automated exposure control was utilized to diminish  patient radiation dose..     FINDINGS: Multiple contiguous axials are obtained from the aortic arch  through the skull base at 1 mm intervals following intravenous contrast  infusion with reformatted images obtained in the sagittal and coronal  projections from the original data set as well as MIPS.     There are changes of centrilobular emphysema within the lung apices.  There is atheromatous calcification of the aortic arch. The origin of  the great vessels are unremarkable. The right vertebral artery is  dominant.     There is diffuse enlargement of the thyroid gland with heterogeneity  within the right lobe suggesting a nodule measuring approximately 1.3 cm  in size. This may be related to a multinodular goiter. The level of the  true and false cords is unremarkable. No enlarged cervical chain or  posterior triangle lymphadenopathy is demonstrated. The nasopharynx and  lateral parapharyngeal spaces are symmetric with no mass or mass effect.  Visualized orbits as well as the paranasal sinuses and mastoid air cells  are normally aerated.      As previously noted the right vertebral artery is dominant. There is  mild calcific plaquing at the distal extracranial aspect of the left  vertebral artery and right vertebral artery as well as within the  proximal intracranial aspect of the right vertebral artery without  evidence of rate limiting stenosis.     Examination of the right common carotid artery demonstrates no focal  stenosis or plaquing to the level of the carotid bifurcation. There is  mixed plaquing involving the carotid bulb and proximal ICA without  evidence of a hemodynamically significant stenosis utilizing NASCET  criteria.     Examination of the left common carotid artery demonstrates mild  scattered calcific plaquing involving the mid segment of the CCA with an  approximate 25% cross-sectional stenosis within the distal CCA just  proximal to the carotid bifurcation. There is mixed plaquing involving  the carotid bulb and extending into the proximal aspect of the left ICA  resulting in a 31% cross-sectional stenosis based on NASCET criteria.  The more distal ICAs are widely patent.       Impression:      1.. There is mild calcific plaquing of the aortic arch. The origin of  the great vessels are unremarkable.  2. The right vertebral artery is dominant. There is mild calcific  plaquing involving the proximal intracranial aspect of the right  vertebral artery as well as the distal extracranial aspect of both  vertebral arteries without evidence of associated rate limiting  stenosis.  3. There is mixed plaquing involving the right carotid bulb and proximal  ICA. Based on NASCET criteria there is no significant stenosis. The more  distal right ICA is widely patent. On the left there is scattered  plaquing involving the mid and distal left CCA with a mild stenosis of  approximately 25% just below the left carotid bifurcation. There is  mixed plaquing involving the carotid bulb and proximal aspect of the  left ICA with an approximate 31%  cross-sectional stenosis within the  proximal left ICA. The more distal left ICA is widely patent.  This report was finalized on 04/13/2021 19:44 by Dr. Teodoro Mays MD.    CT CEREBRAL PERFUSION WITH & WITHOUT CONTRAST [[id number]] Collected: 04/13/21 1908     Updated: 04/13/21 1913    Narrative:      Indication: Slurred speech. Weakness now resolving.     Exam:      1. Perfusion CT is performed to acquire images tracking the temporal  course of iodinated contrast material passing through the cerebral  circulation. Perfusion parameters, such as cerebral blood flow (CBF),  cerebral blood volume (CBV), mean transit time (MTT), etc. are  calculated by [employer] with additional provided perfusion maps and  estimated stroke volumes.  2. Automated exposure control (AEC) protocols are utilized on the  scanner to ensure dose lowered technique.      Comparison: Noncontrast CT brain and brain angiogram dated 4/13/2021  6:47 PM CDT     Findings:     Normal, symmetric and MTT, TTP, CBV and CBF.      Distribution: No perfusion abnormality is demonstrated..     Tmax >6.0 seconds volume: 0 ml     CBF < 30% volume: 0 ml     Mismatch volume: 0 ml      Mismatch ratio: None       Impression:      Impression:  1. Normal, symmetric cerebral perfusion. No discrete infarct detected by  the perfusion software.  This report was finalized on 04/13/2021 19:10 by Dr. Teodoro Mays MD.    CT Head Without Contrast Stroke Protocol [[id number]] Collected: 04/13/21 1831     Updated: 04/13/21 1837    Narrative:      EXAMINATION: CT head without contrast could stroke protocol for 13 2021     DOSE: 1262 mGycm. Automated exposure control was utilized to diminish  patient radiation dose..     HISTORY: Transient difficulty with motion.     FINDINGS: Multiple contiguous axials are obtained from the skull base to  the vertex per protocol without intravenous contrast-enhancement with  reformatted images obtained in the sagittal and coronal projections  from  the original data set.     There is evidence of a remote infarct involving the right basal ganglia  with encephalomalacia. There is no evidence of mass, mass effect or  shift of the midline. No evidence of acute infarct or hemorrhage.     Visualized paranasal sinuses and mastoid air cells are normally aerated.  No acute calvarial abnormalities are present.       Impression:      1.. Remote right basal ganglia infarct with focal encephalomalacia.  2. Otherwise unremarkable nonenhanced CT of the brain.  3. The code stroke report was phoned to Dr. Stoner in the emergency  room at 6:31 PM.  This report was finalized on 2021 18:34 by Dr. Teodoro Mays MD.          Orders (last 48 hrs)      Start     Ordered    21 1200  clopidogrel (PLAVIX) tablet 75 mg  Daily      21 1112    21 0946  Inpatient Admission  Once      21 0947    21 0943  Diet Soft Texture; Ground; Thin  Diet Effective Now      21 0942    21 0942  SLP Plan of Care Cert / Re-Cert  Once     Comments: Speech Language Pathology Plan of Care  Initial Certification  Certification Period: 2021 - 2021    Patient Name: Loren Doll  : 1960    (G45.9) TIA (transient ischemic attack)  (primary encounter diagnosis)  (R13.10) Dysphagia, unspecified type                Assessment  SLP Assessment  Prior Level of Function-Communication: WFL  Prior Level of Function-Swallowing: no diet consistency restrictions  SLP Swallowing Diagnosis: R/O pharyngeal dysphagia  Plan of Care Reviewed With: patient, other (see comments) (RN)  Swallow Criteria for Skilled Therapeutic Interventions Met: demonstrates skilled criteria      IP SLP Goals     Row Name 21 0920             Oral Nutrition/Hydration Goal 1 (SLP)    Oral Nutrition/Hydration Goal 1, SLP  Patient will tolerate LRD without   s/s of aspiration.  -MM      Time Frame (Oral Nutrition/Hydration Goal 1, SLP)  by discharge  -MM      Barriers  (Oral Nutrition/Hydration Goal 1, SLP)  none  -MM      Progress/Outcomes (Oral Nutrition/Hydration Goal 1, SLP)  goal ongoing    -MM        User Key  (r) = Recorded By, (t) = Taken By, (c) = Cosigned By    Initials Name Provider Type    Stella Rosa, MS CCC-SLP Speech and Language Pathologist                  Plan    SLP Plan  Therapy Frequency (Swallow): at least, PRN  Predicted Duration Therapy Intervention (Days): until discharge        Stella Osman MS CCC-SLP  4/14/2021      By cosigning this order, either electronically or physically, I certify that the therapy services are furnished while this patient is under my care, the services outline above are required by this patient, and will be reviewed every 90 days.        M.D.:__________________________________________ Date: ______________    04/14/21 0942    04/14/21 0900  aspirin tablet 325 mg  Daily,   Status:  Discontinued      04/13/21 2216 04/14/21 0900  atenolol (TENORMIN) tablet 50 mg  Daily      04/13/21 2216 04/14/21 0900  citalopram (CeleXA) tablet 10 mg  Daily      04/13/21 2216 04/14/21 0900  meloxicam (MOBIC) tablet 7.5 mg  Daily      04/13/21 2216 04/14/21 0900  nitroglycerin (NITRODUR) 0.3 MG/HR patch 2 patch  Daily,   Status:  Discontinued      04/13/21 2216 04/14/21 0900  nitroglycerin (NITRODUR) 0.2 MG/HR patch 1 patch  Daily      04/14/21 0700    04/14/21 0900  nitroglycerin (NITRODUR) 0.4 MG/HR patch 1 patch  Daily      04/14/21 0700    04/14/21 0616  Hemoglobin A1c  Morning Draw      04/13/21 2216 04/14/21 0616  Lipid Panel  Morning Draw      04/13/21 2216 04/14/21 0616  Comprehensive Metabolic Panel  Morning Draw      04/13/21 2216 04/14/21 0616  CBC (No Diff)  Morning Draw      04/13/21 2216 04/14/21 0616  TSH  Morning Draw      04/13/21 2216 04/14/21 0616  Vitamin B12  Morning Draw      04/13/21 2216 04/14/21 0600  Incentive Spirometry  Every 2 Hours While Awake      04/13/21 2216     04/14/21 0000  Vital Signs  Every 4 Hours      04/13/21 2216 04/14/21 0000  Intake and Output  Every 4 Hours      04/13/21 2216 04/14/21 0000  POC Glucose Q6H  Every 6 Hours,   Status:  Canceled     Comments: May Discontinue After 2 Consecutive Readings Less Than 140Notify Provider if 2 Readings Greater Than 140      04/13/21 2216 04/14/21 0000  MRI Brain Without Contrast  1 Time Imaging      04/13/21 2216 04/14/21 0000  US Carotid Bilateral  1 Time Imaging,   Status:  Canceled      04/13/21 2216 04/14/21 0000  Adult Transthoracic Echo Complete W/ Cont if Necessary Per Protocol (With Agitated Saline)  Once      04/13/21 2216 04/13/21 2359  POC Glucose Once  Once      04/13/21 2344    04/13/21 2218  ipratropium-albuterol (DUO-NEB) nebulizer solution 3 mL  4 Times Daily - RT      04/13/21 2216 04/13/21 2218  gabapentin (NEURONTIN) capsule 300 mg  3 Times Daily      04/13/21 2216 04/13/21 2218  sodium chloride 0.9 % flush 10 mL  Every 12 Hours Scheduled      04/13/21 2216 04/13/21 2218  atorvastatin (LIPITOR) tablet 80 mg  Nightly      04/13/21 2216 04/13/21 2217  Intake & Output  Every Shift      04/13/21 2216 04/13/21 2217  Weigh Patient  Once      04/13/21 2216 04/13/21 2217  Oxygen Therapy- Nasal Cannula; Titrate for SPO2: 90% - 95%  Continuous      04/13/21 2216 04/13/21 2217  Insert Peripheral IV  Once      04/13/21 2216 04/13/21 2217  Saline Lock & Maintain IV Access  Continuous      04/13/21 2216 04/13/21 2217  Vital Signs  Per Order Details,   Status:  Canceled     Comments: For ICU Admission: Vital Signs Every 2 Hours  For Telemetry Unit Admission: Vital Signs Every 4 Hours  Keep Systolic Blood Pressure Less Than 220, Diastolic Blood Pressure Less Than 110    04/13/21 2216 04/13/21 2217  Pulse Oximetry, Continuous  Continuous      04/13/21 2216 04/13/21 2217  Cardiac Monitoring  Continuous      04/13/21 2216 04/13/21 2217  Notify Provider  Until  Discontinued      04/13/21 2216 04/13/21 2217  Turn Patient  Now Then Every 2 Hours      04/13/21 2216 04/13/21 2217  Nursing Dysphagia Screening (Complete Prior to Giving Anything By Mouth)  Once      04/13/21 2216 04/13/21 2217  RN to Place Order SLP Consult - Eval & Treat Choosing Reason of RN Dysphagia Screen Failed  Per Order Details     Comments: RN to Place Order SLP Consult - Eval & Treat Choosing Reason of RN Dysphagia Screen Failed    04/13/21 2216 04/13/21 2217  Nurse to Call MD or Nutrition Services for Diet if Patient Passes Dysphagia Screen  Once      04/13/21 2216 04/13/21 2217  NPO Diet  Diet Effective Now,   Status:  Canceled     Comments: Strict NPO Until Nursing Dysphagia Screen Passed    04/13/21 2216 04/13/21 2217  Neuro Checks  Per Order Details     Comments: For ICU Admission: Neuro Checks to Include All Stroke Deficits Every Hour x10 Hours Then Every 2 Hours  For Telemetry Unit Admission: Neuro Checks to Include All Stroke Deficits Every 4 Hours    04/13/21 2216 04/13/21 2217  NIHSS Assessment  Every Shift     Comments: Turn off all sedation medications prior to performing assessment. Assessment to be performed upon admission, transfer to another unit, discharge, and with neurological decline. If NIHSS change is greater than or equal to 4 and/or neurological decline is noted notify physician.    04/13/21 2216 04/13/21 2217  Provide Stroke Education Material  Prior to Discharge     Comments: Educate patient PRN and daily during hospitalization.    04/13/21 2216 04/13/21 2217  OT Consult: Eval & Treat  Once      04/13/21 2216 04/13/21 2217  PT Consult: Eval & Treat As Tolerated  Once      04/13/21 2216 04/13/21 2217  SLP Consult: Eval & Treat Communication Disorder  Once     Comments: Per stroke protocol.    04/13/21 2216 04/13/21 2217  Inpatient Case Management  Consult  Once     Provider:  (Not yet assigned)    04/13/21 2216 04/13/21 2217   Inpatient Diabetes Educator Consult  Once     Provider:  (Not yet assigned)    04/13/21 2216 04/13/21 2217  Assessed for Rehabilitation Services  Once      04/13/21 2216 04/13/21 2217  Reason for Not Administering IV Thrombolytic  Once      04/13/21 2216 04/13/21 2217  Place Sequential Compression Device  Once      04/13/21 2216 04/13/21 2217  Maintain Sequential Compression Device  Continuous      04/13/21 2216 04/13/21 2217  Ok for cardiac diet if passes nurse swallow test  Misc Nursing Order (Specify)  Once     Comments: Ok for cardiac diet if passes nurse swallow test    04/13/21 2216 04/13/21 2217  Reason For No Antithrombotic On Admission  Once      04/13/21 2216 04/13/21 2216  acetaminophen (TYLENOL) tablet 650 mg  Every 4 Hours PRN      04/13/21 2216 04/13/21 2216  acetaminophen (TYLENOL) 160 MG/5ML solution 650 mg  Every 4 Hours PRN      04/13/21 2216 04/13/21 2216  acetaminophen (TYLENOL) suppository 650 mg  Every 4 Hours PRN      04/13/21 2216 04/13/21 2216  ondansetron (ZOFRAN) tablet 4 mg  Every 6 Hours PRN      04/13/21 2216 04/13/21 2216  ondansetron (ZOFRAN) injection 4 mg  Every 6 Hours PRN      04/13/21 2216 04/13/21 2216  sodium chloride 0.9 % flush 10 mL  As Needed      04/13/21 2216 04/13/21 2145  Code Status and Medical Interventions:  Continuous      04/13/21 2149 04/13/21 2105  Initiate Observation Status  Once      04/13/21 2105 04/13/21 2052  COVID-19, ABBOTT IN-HOUSE,NASAL Swab (NO TRANSPORT MEDIA) 2 HR TAT - Swab, Nasopharynx  Once      04/13/21 2051 04/13/21 2035  Inpatient Neurology Consult Stroke  Once     Specialty:  Neurology  Provider:  (Not yet assigned)    04/13/21 2034 04/13/21 1917  SLP Consult: Eval & Treat RN Dysphagia Screen Failed  Once      04/13/21 1916 04/13/21 1901  Ethanol  STAT      04/13/21 1900    04/13/21 1852  TSH  Once      04/13/21 1851 04/13/21 1851  Urine Drug Screen - Urine, Clean Catch  Once       04/13/21 1851    04/13/21 1851  Urinalysis With Culture If Indicated - Urine, Clean Catch  Once      04/13/21 1851    04/13/21 1850  iopamidol (ISOVUE-370) 76 % injection 125 mL  Once in Imaging      04/13/21 1848    04/13/21 1818  CT Angiogram Neck  1 Time Imaging      04/13/21 1817    04/13/21 1818  CT CEREBRAL PERFUSION WITH & WITHOUT CONTRAST  1 Time Imaging      04/13/21 1817    04/13/21 1817  Undress and Gown  Once      04/13/21 1817    04/13/21 1817  Cardiac Monitoring  Once,   Status:  Canceled      04/13/21 1817    04/13/21 1817  Continuous Pulse Oximetry  Continuous,   Status:  Canceled      04/13/21 1817    04/13/21 1817  Vital Signs  Every 30 Minutes,   Status:  Canceled      04/13/21 1817    04/13/21 1817  CT Head Without Contrast Stroke Protocol  1 Time Imaging      04/13/21 1817    04/13/21 1817  XR Chest 1 View  1 Time Imaging      04/13/21 1817    04/13/21 1817  ECG 12 Lead  Once      04/13/21 1817    04/13/21 1817  POC Glucose Once  Once      04/13/21 1817    04/13/21 1817  CBC & Differential  Once      04/13/21 1817    04/13/21 1817  Comprehensive Metabolic Panel  Once      04/13/21 1817    04/13/21 1817  Protime-INR  Once      04/13/21 1817    04/13/21 1817  Type & Screen  Once      04/13/21 1817    04/13/21 1817  Insert Peripheral IV  Once     Comments: 20 G Minimum - Right AC Preferred    04/13/21 1817    04/13/21 1817  [place] Draw  Once      04/13/21 1817    04/13/21 1817  NPO Diet  Diet Effective Now,   Status:  Canceled      04/13/21 1817 04/13/21 1817  Nursing Swallow Assessment  Once      04/13/21 1817    04/13/21 1817  CT Angiogram Head  1 Time Imaging      04/13/21 1817    04/13/21 1817  CBC Auto Differential  PROCEDURE ONCE      04/13/21 1817    04/13/21 1817  Light Blue Top  PROCEDURE ONCE      04/13/21 1817 04/13/21 1817  Green Top (Gel)  PROCEDURE ONCE      04/13/21 1817 04/13/21 1817  Lavender Top  PROCEDURE ONCE      04/13/21 1817 04/13/21 1817  Red Top  PROCEDURE ONCE       04/13/21 1817 04/13/21 1816  Oxygen Therapy- Nasal Cannula; 2 LPM; Titrate for SPO2: 92%, Greater Than or Equal To  Continuous PRN,   Status:  Canceled      04/13/21 1817 04/13/21 1816  sodium chloride 0.9 % flush 10 mL  As Needed      04/13/21 1817    Unscheduled  Order CT Head Without Contrast for Neurological Decline  As Needed      04/13/21 2216    Unscheduled  Up With Assistance  As Needed      04/13/21 2216    --  HYDROcodone-acetaminophen (NORCO) 5-325 MG per tablet  2 times daily,   Status:  Discontinued      04/13/21 1901    --  cyclobenzaprine (FLEXERIL) 10 MG tablet  2 Times Daily PRN      04/13/21 1901    --  atenolol (TENORMIN) 50 MG tablet  Daily      04/13/21 1901    --  meloxicam (MOBIC) 7.5 MG tablet  Daily      04/13/21 1901    --  aspirin 325 MG tablet  Daily      04/13/21 1901    --  omeprazole (priLOSEC) 20 MG capsule  Daily      04/13/21 1901    --  citalopram (CeleXA) 10 MG tablet  Daily      04/13/21 1901    --  nitroglycerin (NITRODUR) 0.6 MG/HR patch  Daily      04/13/21 1901    --  Fluticasone Furoate-Vilanterol (Breo Ellipta) 100-25 MCG/INH inhaler  Daily - RT      04/13/21 1901    --  gabapentin (NEURONTIN) 300 MG capsule  3 Times Daily      04/13/21 2140    --  albuterol sulfate  (90 Base) MCG/ACT inhaler  Every 4 Hours PRN      04/13/21 2140    --  atorvastatin (LIPITOR) 20 MG tablet  Nightly      04/13/21 2140    --  SCANNED - TELEMETRY        04/13/21 0000              Physician Progress Notes (last 24 hours) (Notes from 04/13/21 1128 through 04/14/21 1128)    No notes of this type exist for this encounter.            Consult Notes (last 24 hours) (Notes from 04/13/21 1128 through 04/14/21 1128)      Rosie Farah MD at 04/13/21 1845              Neurology Consult Note    Patient:  Loren Doll   YOB: 1960  MRN:  [id number]  Date of Admission:  4/13/2021  6:15 PM    Date: 4/13/2021    Referring Provider: Seth Stoner MD  Reason  for Consultation: Code stroke      History of present illness:     This is a 61 y.o. right handed female with a H/O previous stroke, CAD, mixed  hyperlipidemia, hypertension, smoker, PVD, Ventricular fibrillation.who was last known well at 5 PM today.  She had some slurred speech and EMTs thought they saw a right facial droop which was not apparent on her arrival to our ER  Dr Stoner called a code stroke as the dysarthria persisted.    Patient states that she took CBD candy this morning around 11 AM.  She threw up and was not feeling well and then took a nap around 2 PM stating she felt dizzy.  She got up around 4 PM and daughter saw her and thought she was normal then.  She went to a BBQ and was not there long before she began to walk funny leaning over to the right with head tilted and turned to the left  Daughter thinks this as 6:15 but EMTs state 5 PM and it is currently 7:30 PM Patient reports she felt dizzy--lightheaded and drunk. Daughter noted she was not talking right and had a right facial droop. EMTs noted a right facial droop.  Nursing here noted left arm drift but that patient could not talk right.  She would read a sentence given to her and miss many of the words. She tested with decreased sensation on the right.  However when Dr Stoner saw her she only had dysarthria.     Head CT was unremarkable for acute changes but revealed old BG stroke on the right.     Past Medical History:   Diagnosis Date   • Stroke (CMS/HCC)       Per Cardiology [facility] from [facility]   • CAD (coronary artery disease)   • Chest pain   • Cholelithiasis   • Cigarette nicotine dependence without complication 11/30/2017   • Fluid retention   • History of myocardial infarction   • History of tobacco abuse   • Hyperlipemia   • Tobacco abuse 5/31/2017   • Ventricular fibrillation (HCC)     History reviewed. No pertinent surgical history.  • CARDIAC CATHETERIZATION 10/24/1994   EF in excess of 50%.   • CARDIAC CATHETERIZATION 07/14/1993    Emergent - [facility] - EF in excess of 50%    Prior to Admission medications    Not on File   Home medications include the following   · Aspirin 325 mg daily  · Atenolol 50 mg daily  · Citalopram 10 mg daily  · Cyclobenzaprine 10 mg   2 times a day as needed  · Hydrocodone 5/325 mg twice a day  · Meloxicam 7.5 mg daily  · Nitroglycerin 0.6 mg/h patch  · Prilosec 20 mg daily  · Fluticasone furoate    Hospital scheduled medications:      Hospital PRN medications:  •  sodium chloride    No Known Allergies    Social History     Socioeconomic History   • Marital status:      Spouse name: Not on file   • Number of children: Not on file   • Years of education: Not on file   • Highest education level: Not on file   Tobacco Use   • Smoking status: Current Every Day Smoker     Packs/day: 1.50     Types: Cigarettes   Substance and Sexual Activity   • Alcohol use: Not Currently   • Drug use: Yes     History reviewed. No pertinent family history.    Review of Systems  A 14 point review of systems was reviewed and was negative except for her transient symptoms of right facial droop and dysarthria with ? Of weakness on the left    Vital Signs   Temp:  [97.6 °F (36.4 °C)] 97.6 °F (36.4 °C)  Heart Rate:  [78-82] 78  Resp:  [20] 20  BP: (120-129)/(59-61) 120/61    General Exam:  Head:  Normal cephalic, atraumatic  HEENT:  Neck supple  Fundoscopic Exam:  No signs of disc edema  CVS:  Regular rate and rhythm.  No murmurs  Carotid Examination:  No bruits  Lungs:  Clear to auscultation  Abdomen:  Non-tender, Non-distended  Extremities:  No signs of peripheral edema  Skin:  No rashes    Neurologic Exam:    Mental Status:    -Awake, Alert, Oriented X 3.  She could read all the sentences name of the items and describe in detail what was going on in the picture with the woman doing dishes  -No word finding difficulties  -No aphasia  -No dysarthria  -Follows simple and complex commands    CN II:  Visual fields full.  No visual extinction  pupils equally reactive to light  CN III, IV, VI:  Extraocular Muscles full with no signs of nystagmus  CN V:  Facial sensory is symmetric   CN VII:  Facial motor symmetric  CN VIII:  Gross hearing intact bilaterally  CN IX/X:  Palate elevates symmetrically  CN XI:  Shoulder shrug symmetric  CN XII:  Tongue is midline on protrusion    Motor: (strength out of 5:  1= minimal movement, 2 = movement in plane of gravity, 3 = movement against gravity, 4 = movement against some resistance, 5 = full strength)    -Right Upper Ext: Proximal: 5 Distal: 5  -Left Upper Ext: Proximal: 5 Distal: 5    -Right Lower Ext: Proximal: 5 Distal: 5  -Left Lower Ext: Proximal: 5 Distal: 5    DTR:  -Right   Bicep: 2+ Triceps: 2+ Brachioradialis: 2+   Patella: 2+ Ankle: 2+ Neg Babinski  -Left   Bicep: 2+ Triceps: 2+ Brachioradialis: 2+   Patella: 2+ Ankle: 2+ Neg Babinski    Sensory:  -Intact to light touch, pinprick.  No tactile extinction    Coordination:  -Finger to nose intact  -Heel to shin intact  -No ataxia    Gait  -Not tested during a code stroke      Results Review:    Lab Results (last 7 days)     Procedure Component Value Units Date/Time    Ethanol [[id number]] Collected: 04/13/21 1832    Specimen: Blood Updated: 04/13/21 1916     Ethanol % <0.010 %     Narrative:      Not for legal purposes. Chain of Custody not followed.     TSH [[id number]]  (Normal) Collected: 04/13/21 1832    Specimen: Blood Updated: 04/13/21 1914     TSH 0.813 uIU/mL     Comprehensive Metabolic Panel [[id number]]  (Abnormal) Collected: 04/13/21 1832    Specimen: Blood Updated: 04/13/21 1909     Glucose 102 mg/dL      BUN 17 mg/dL      Creatinine 0.68 mg/dL      Sodium 144 mmol/L      Potassium 4.5 mmol/L      Chloride 105 mmol/L      CO2 30.0 mmol/L      Calcium 8.9 mg/dL      Total Protein 6.7 g/dL      Albumin 4.00 g/dL      ALT (SGPT) 22 U/L      AST (SGOT) 16 U/L      Alkaline Phosphatase 176 U/L      Total Bilirubin 0.2 mg/dL      eGFR Non  Amer 88  mL/min/1.73      eGFR  Kenny Tay 106 mL/min/1.73      Globulin 2.7 gm/dL      A/G Ratio 1.5 g/dL      BUN/Creatinine Ratio 25.0     Anion Gap 9.0 mmol/L     Narrative:      GFR Normal >60  Chronic Kidney Disease <60  Kidney Failure <15      Protime-INR [[id number]]  (Normal) Collected: 04/13/21 1832    Specimen: Blood Updated: 04/13/21 1858     Protime 12.9 Seconds      INR 1.01    CBC & Differential [[id number]]  (Abnormal) Collected: 04/13/21 1832    Specimen: Blood Updated: 04/13/21 1849    Narrative:      The following orders were created for panel order CBC & Differential.  Procedure                               Abnormality         Status                     ---------                               -----------         ------                     CBC Auto Differential[[id number]]        Abnormal            Final result                 Please view results for these tests on the individual orders.    CBC Auto Differential [[id number]]  (Abnormal) Collected: 04/13/21 1832    Specimen: Blood Updated: 04/13/21 1849     WBC 6.95 10*3/mm3      RBC 5.01 10*6/mm3      Hemoglobin 13.2 g/dL      Hematocrit 41.7 %      MCV 83.2 fL      MCH 26.3 pg      MCHC 31.7 g/dL      RDW 15.2 %      RDW-SD 45.6 fl      MPV 9.3 fL      Platelets 280 10*3/mm3      Neutrophil % 59.7 %      Lymphocyte % 32.7 %      Monocyte % 6.6 %      Eosinophil % 0.1 %      Basophil % 0.6 %      Immature Grans % 0.3 %      Neutrophils, Absolute 4.15 10*3/mm3      Lymphocytes, Absolute 2.27 10*3/mm3      Monocytes, Absolute 0.46 10*3/mm3      Eosinophils, Absolute 0.01 10*3/mm3      Basophils, Absolute 0.04 10*3/mm3      Immature Grans, Absolute 0.02 10*3/mm3      nRBC 0.0 /100 WBC           .  Imaging Results (Last 24 Hours)     Procedure Component Value Units Date/Time    CT CEREBRAL PERFUSION WITH & WITHOUT CONTRAST [[id number]] Collected: 04/13/21 1908     Updated: 04/13/21 1913    Narrative:      Indication: Slurred speech. Weakness now resolving.      Exam:      1. Perfusion CT is performed to acquire images tracking the temporal  course of iodinated contrast material passing through the cerebral  circulation. Perfusion parameters, such as cerebral blood flow (CBF),  cerebral blood volume (CBV), mean transit time (MTT), etc. are  calculated by [employer] with additional provided perfusion maps and  estimated stroke volumes.  2. Automated exposure control (AEC) protocols are utilized on the  scanner to ensure dose lowered technique.      Comparison: Noncontrast CT brain and brain angiogram dated 4/13/2021  6:47 PM CDT     Findings:     Normal, symmetric and MTT, TTP, CBV and CBF.      Distribution: No perfusion abnormality is demonstrated..     Tmax >6.0 seconds volume: 0 ml     CBF < 30% volume: 0 ml     Mismatch volume: 0 ml      Mismatch ratio: None       Impression:      Impression:  1. Normal, symmetric cerebral perfusion. No discrete infarct detected by  the perfusion software.  This report was finalized on 04/13/2021 19:10 by Dr. Teodoro Mays MD.    CT Angiogram Head [[id number]] Resulted: 04/13/21 1830     Updated: 04/13/21 1849    CT Angiogram Neck [[id number]] Resulted: 04/13/21 1830     Updated: 04/13/21 1849    CT Head Without Contrast Stroke Protocol [[id number]] Collected: 04/13/21 1831     Updated: 04/13/21 1837    Narrative:      EXAMINATION: CT head without contrast could stroke protocol for 13 2021     DOSE: 1262 mGycm. Automated exposure control was utilized to diminish  patient radiation dose..     HISTORY: Transient difficulty with motion.     FINDINGS: Multiple contiguous axials are obtained from the skull base to  the vertex per protocol without intravenous contrast-enhancement with  reformatted images obtained in the sagittal and coronal projections from  the original data set.     There is evidence of a remote infarct involving the right basal ganglia  with encephalomalacia. There is no evidence of mass, mass effect or  shift of the  midline. No evidence of acute infarct or hemorrhage.     Visualized paranasal sinuses and mastoid air cells are normally aerated.  No acute calvarial abnormalities are present.       Impression:      1.. Remote right basal ganglia infarct with focal encephalomalacia.  2. Otherwise unremarkable nonenhanced CT of the brain.  3. The code stroke report was phoned to Dr. Stoner in the emergency  room at 6:31 PM.  This report was finalized on 04/13/2021 18:34 by Dr. Teodoro Mays MD.              Impression    1. Stroke like symptoms that resolved c/w TIA  2. No TPA as symptoms resolved  3. Multiple medications that may have contributed to symptoms  4. Smoker of 1.5 ppd      Plan  · Smoking cessation education  · MRI of brain without  · Cardiac monitoring  · SCD's  · Aspirin  · Lipitor 80 mg  · Hemoglobin A1C  · Lipid panel  · UDS  · U/A  · TSH  · Echo with bubble        I discussed the patients findings and my recommendations with patient, family, nursing staff and Dr Herbie TRACY. Alan Mobley MD  04/13/21  19:29 CDT      Electronically signed by Rosie Farah MD at 04/13/21 3915

## 2021-04-14 NOTE — H&P
[facility] Medicine Services  HISTORY AND PHYSICAL    Date of Admission: 2021  Primary Care Physician: Nolan Hoffman Jr., MD    Subjective     Chief Complaint: TIA    History of Present Illness  Loren Doll is a 61-year-old female with a history of stroke, coronary artery disease, hyperlipidemia, hypertension, tobacco dependence, peripheral vascular disease, ventricular fibrillation last known well approximately 5 PM today.  She presented to [facility] emergency department via EMTs with right facial droop, slurred speech, code stroke was called.  Patient was evaluated by neurology.  Patient did take CBD candy approximately 11 AM she reported to neurology that she threw them up, dizziness, was not feeling well followed with a nap around 2 PM.  Patient went to have function and had neurological changes.  EMT were called.  By the time neurology arrived patient reported symptoms had resolved.  Neurology wishes for patient to be admitted for TIA work-up.  Patient declines NicoDerm patch as she states it causes her to have a dizziness.  Laboratory studies revealed elevated alkaline phosphatase of 176, urine drug screen positive for marijuana and tricyclic antidepressant.  Neurological studies without acute findings.  She is admitted for further evaluation treatment.    Review of Systems   A 10 point review of systems was completed, all negative except for those discussed in HPI    Past Medical History:   Past Medical History:   Diagnosis Date   • Coronary artery disease    • Hyperlipidemia    • Hypertension    • Myocardial infarction (CMS/HCC)    • Peripheral vascular disease (CMS/HCC)    • Stroke (CMS/HCC)    • Tobacco dependence    • VF (ventricular fibrillation) (CMS/HCC)        Past Surgical History:   Past Surgical History:   Procedure Laterality Date   •  SECTION     • CHOLECYSTECTOMY         Family History: family history includes COPD in  "her mother; Heart disease in her father.    Social History:  reports that she has been smoking cigarettes. She has been smoking about 1.50 packs per day. She does not have any smokeless tobacco history on file. She reports previous alcohol use. She reports current drug use.    Code Status: Full, if unable speak for herself her daughter Bharti will speak for her      Allergies:  No Known Allergies    Medications:  Prior to Admission medications    Medication Sig Start Date End Date Taking? Authorizing Provider   aspirin 325 MG tablet Take 325 mg by mouth Daily.   Yes Jodee Taylor MD   atenolol (TENORMIN) 50 MG tablet Take 50 mg by mouth Daily.   Yes Jodee Taylor MD   citalopram (CeleXA) 10 MG tablet Take 10 mg by mouth Daily.   Yes Jodee Taylor MD   cyclobenzaprine (FLEXERIL) 10 MG tablet Take 10 mg by mouth 2 (Two) Times a Day As Needed for Muscle Spasms.   Yes Jodee Taylor MD   Fluticasone Furoate-Vilanterol (Breo Ellipta) 100-25 MCG/INH inhaler Inhale 1 puff Daily.   Yes Jodee Taylor MD   HYDROcodone-acetaminophen (NORCO) 5-325 MG per tablet Take 1 tablet by mouth 2 (two) times a day.   Yes Jodee Taylor MD   meloxicam (MOBIC) 7.5 MG tablet Take 7.5 mg by mouth Daily.   Yes Jodee Taylor MD   nitroglycerin (NITRODUR) 0.6 MG/HR patch Place 1 patch on the skin as directed by provider Daily.   Yes Jodee Taylor MD   omeprazole (priLOSEC) 20 MG capsule Take 20 mg by mouth Daily.   Yes Jodee Taylor MD   Albuterol inhaler every 4 hours as needed for shortness of breathing  Neurontin 300 mg 3 times a day  Lipitor 20 mg daily    Objective     /53 (BP Location: Right arm, Patient Position: Lying)   Pulse 73   Temp 98 °F (36.7 °C) (Oral)   Resp 20   Ht 152.4 cm (60\")   Wt 89 kg (196 lb 3.2 oz)   SpO2 98%   BMI 38.32 kg/m²   Physical Exam  Vitals reviewed.   Constitutional:       Appearance: She is obese.   HENT:      Head: " Normocephalic and atraumatic.      Mouth/Throat:      Mouth: Mucous membranes are dry.      Pharynx: Oropharynx is clear.   Eyes:      Extraocular Movements: Extraocular movements intact.      Pupils: Pupils are equal, round, and reactive to light.   Cardiovascular:      Rate and Rhythm: Normal rate and regular rhythm.   Pulmonary:      Effort: Pulmonary effort is normal.      Comments: Diminished throughout without adventitious breath sounds  Abdominal:      General: Bowel sounds are normal.      Palpations: Abdomen is soft.   Musculoskeletal:      Cervical back: Normal range of motion and neck supple.      Right lower leg: No edema.      Left lower leg: No edema.      Comments: Generalized weakness and debility   Skin:     General: Skin is warm and dry.   Neurological:      General: No focal deficit present.      Mental Status: She is alert and oriented to person, place, and time.   Psychiatric:         Mood and Affect: Mood normal.         Behavior: Behavior normal.         Pertinent Data:   Lab Results (last 72 hours)     Procedure Component Value Units Date/Time    COVID-19, ABBOTT IN-HOUSE,NASAL Swab (NO TRANSPORT MEDIA) 2 HR TAT - Swab, Nasopharynx [[id number]]  (Normal) Collected: 04/13/21 2058    Specimen: Swab from Nasopharynx Updated: 04/13/21 2126     COVID19 Presumptive Negative    Urine Drug Screen - Urine, Clean Catch [[id number]]  (Abnormal) Collected: 04/13/21 2022    Specimen: Urine, Clean Catch Updated: 04/13/21 2056     THC, Screen, Urine Positive     Phencyclidine (PCP), Urine Negative     Cocaine Screen, Urine Negative     Methamphetamine, Ur Negative     Opiate Screen Negative     Amphetamine Screen, Urine Negative     Benzodiazepine Screen, Urine Negative     Tricyclic Antidepressants Screen Positive     Methadone Screen, Urine Negative     Barbiturates Screen, Urine Negative     Oxycodone Screen, Urine Negative     Propoxyphene Screen Negative     Buprenorphine, Screen, Urine Negative     Urinalysis With Culture If Indicated - Urine, Clean Catch [[id number]]  (Abnormal) Collected: 04/13/21 2023    Specimen: Urine, Clean Catch Updated: 04/13/21 2032     Color, UA Yellow     Appearance, UA Clear     pH, UA 6.0     Specific Gravity, UA >1.030     Glucose, UA Negative     Ketones, UA Negative     Bilirubin, UA Negative     Blood, UA Negative     Protein, UA Negative     Leuk Esterase, UA Negative     Nitrite, UA Negative     Urobilinogen, UA 1.0 E.U./dL    Ethanol [[id number]] Collected: 04/13/21 1832    Specimen: Blood Updated: 04/13/21 1916     Ethanol % <0.010 %     Narrative:      Not for legal purposes. Chain of Custody not followed.     TSH [[id number]]  (Normal) Collected: 04/13/21 1832    Specimen: Blood Updated: 04/13/21 1914     TSH 0.813 uIU/mL     Comprehensive Metabolic Panel [[id number]]  (Abnormal) Collected: 04/13/21 1832    Specimen: Blood Updated: 04/13/21 1909     Glucose 102 mg/dL      BUN 17 mg/dL      Creatinine 0.68 mg/dL      Sodium 144 mmol/L      Potassium 4.5 mmol/L      Chloride 105 mmol/L      CO2 30.0 mmol/L      Calcium 8.9 mg/dL      Total Protein 6.7 g/dL      Albumin 4.00 g/dL      ALT (SGPT) 22 U/L      AST (SGOT) 16 U/L      Alkaline Phosphatase 176 U/L      Total Bilirubin 0.2 mg/dL      eGFR Non African Amer 88 mL/min/1.73      eGFR  African Amer 106 mL/min/1.73      Globulin 2.7 gm/dL      A/G Ratio 1.5 g/dL      BUN/Creatinine Ratio 25.0     Anion Gap 9.0 mmol/L     Protime-INR [[id number]]  (Normal) Collected: 04/13/21 1832    Specimen: Blood Updated: 04/13/21 1858     Protime 12.9 Seconds      INR 1.01    CBC Auto Differential [[id number]]  (Abnormal) Collected: 04/13/21 1832    Specimen: Blood Updated: 04/13/21 1849     WBC 6.95 10*3/mm3      RBC 5.01 10*6/mm3      Hemoglobin 13.2 g/dL      Hematocrit 41.7 %      MCV 83.2 fL      MCH 26.3 pg      MCHC 31.7 g/dL      RDW 15.2 %      RDW-SD 45.6 fl      MPV 9.3 fL      Platelets 280 10*3/mm3      Neutrophil % 59.7 %       Lymphocyte % 32.7 %      Monocyte % 6.6 %      Eosinophil % 0.1 %      Basophil % 0.6 %      Immature Grans % 0.3 %      Neutrophils, Absolute 4.15 10*3/mm3      Lymphocytes, Absolute 2.27 10*3/mm3      Monocytes, Absolute 0.46 10*3/mm3      Eosinophils, Absolute 0.01 10*3/mm3      Basophils, Absolute 0.04 10*3/mm3      Immature Grans, Absolute 0.02 10*3/mm3      nRBC 0.0 /100 WBC         Imaging Results (Last 24 Hours)     Procedure Component Value Units Date/Time    XR Chest 1 View [[id number]] Collected: 04/13/21 2015     Updated: 04/13/21 2019    Narrative:      EXAMINATION: Chest 1 view 4/13/2021     HISTORY: Stroke protocol     FINDINGS: Upright frontal projection of chest demonstrates a right  basilar granuloma as well as calcified right perihilar lymph nodes.  Lungs are otherwise clear. There is no effusion or free air present.       Impression:      1. No acute disease.  This report was finalized on 04/13/2021 20:16 by Dr. Teodoro Mays MD.    CT Angiogram Head [[id number]] Collected: 04/13/21 2004     Updated: 04/13/21 2016    Narrative:      EXAMINATION: CT angiogram of the head 4/13/2021     DOSE: 182.5 mGycm. Automated exposure control was utilized to diminish  patient radiation dose..     HISTORY: Weakness now resolving.     FINDINGS: CT angiography was performed of the [place] of [place] following  intravenous contrast administration with reformatted images obtained in  the sagittal and coronal projections from the original data set as well  as three-dimensional reconstructions. MIPS are also obtained.     There is mild calcific plaquing involving the distal extracranial aspect  of both vertebral arteries and the proximal intracranial aspect of the  right vertebral artery without evidence of rate limiting stenosis. The  right vertebral artery is dominant.     The posterior inferior cerebellar arteries are patent. The basilar  artery is normal in caliber. The superior cerebellar arteries  and  posterior cerebral arteries are widely patent. There is a persistent  fetal origin of the left posterior cerebral artery.     Both distal ICAs are widely patent. There is calcific plaquing involving  the cavernous segment of both ICAs as well as calcific plaquing  involving the supraclinoid aspect of both ICAs with mild associated  stenosis. The anterior and middle cerebral arteries are normal in  caliber without evidence of focal stenosis or discrete berry aneurysm.     There is no evidence of dural venous sinus thrombosis.       Impression:      1.. Mild calcific plaquing involving the distal extracranial aspect of  both vertebral arteries and the proximal intracranial aspect of the  right vertebral artery without evidence of rate limiting stenosis.  2. Persistent fetal origin of the left posterior cerebral artery. There  is also a P1 segment on the left contributing to the left posterior  cerebral circulation. The posterior circulation is otherwise  unremarkable.  3. Mild calcific plaquing involving the cavernous and supraclinoid  aspect of both ICAs with mild associated stenosis. The anterior and  middle cerebral arteries demonstrate normal enhancement with no evidence  of intraluminal thrombus, focal steno-occlusive disease or discrete  berry aneurysm.  4. Normal enhancement of the dural venous sinuses with no evidence of  dural venous sinus thrombosis.  This report was finalized on 04/13/2021 20:13 by Dr. Teodoro Mays MD.    CT Angiogram Neck [[id number]] Collected: 04/13/21 1931     Updated: 04/13/21 1947    Narrative:      EXAMINATION: CT angiogram of the neck 4/13/2021     HISTORY: Code stroke.     DOSE: 182.5 mGycm. Automated exposure control was utilized to diminish  patient radiation dose..     FINDINGS: Multiple contiguous axials are obtained from the aortic arch  through the skull base at 1 mm intervals following intravenous contrast  infusion with reformatted images obtained in the sagittal  and coronal  projections from the original data set as well as MIPS.     There are changes of centrilobular emphysema within the lung apices.  There is atheromatous calcification of the aortic arch. The origin of  the great vessels are unremarkable. The right vertebral artery is  dominant.     There is diffuse enlargement of the thyroid gland with heterogeneity  within the right lobe suggesting a nodule measuring approximately 1.3 cm  in size. This may be related to a multinodular goiter. The level of the  true and false cords is unremarkable. No enlarged cervical chain or  posterior triangle lymphadenopathy is demonstrated. The nasopharynx and  lateral parapharyngeal spaces are symmetric with no mass or mass effect.  Visualized orbits as well as the paranasal sinuses and mastoid air cells  are normally aerated.     As previously noted the right vertebral artery is dominant. There is  mild calcific plaquing at the distal extracranial aspect of the left  vertebral artery and right vertebral artery as well as within the  proximal intracranial aspect of the right vertebral artery without  evidence of rate limiting stenosis.     Examination of the right common carotid artery demonstrates no focal  stenosis or plaquing to the level of the carotid bifurcation. There is  mixed plaquing involving the carotid bulb and proximal ICA without  evidence of a hemodynamically significant stenosis utilizing NASCET  criteria.     Examination of the left common carotid artery demonstrates mild  scattered calcific plaquing involving the mid segment of the CCA with an  approximate 25% cross-sectional stenosis within the distal CCA just  proximal to the carotid bifurcation. There is mixed plaquing involving  the carotid bulb and extending into the proximal aspect of the left ICA  resulting in a 31% cross-sectional stenosis based on NASCET criteria.  The more distal ICAs are widely patent.       Impression:      1.. There is mild calcific  plaquing of the aortic arch. The origin of  the great vessels are unremarkable.  2. The right vertebral artery is dominant. There is mild calcific  plaquing involving the proximal intracranial aspect of the right  vertebral artery as well as the distal extracranial aspect of both  vertebral arteries without evidence of associated rate limiting  stenosis.  3. There is mixed plaquing involving the right carotid bulb and proximal  ICA. Based on NASCET criteria there is no significant stenosis. The more  distal right ICA is widely patent. On the left there is scattered  plaquing involving the mid and distal left CCA with a mild stenosis of  approximately 25% just below the left carotid bifurcation. There is  mixed plaquing involving the carotid bulb and proximal aspect of the  left ICA with an approximate 31% cross-sectional stenosis within the  proximal left ICA. The more distal left ICA is widely patent.  This report was finalized on 04/13/2021 19:44 by Dr. Teodoro Mays MD.    CT CEREBRAL PERFUSION WITH & WITHOUT CONTRAST [[id number]] Collected: 04/13/21 1908     Updated: 04/13/21 1913    Narrative:      Indication: Slurred speech. Weakness now resolving.     Exam:      1. Perfusion CT is performed to acquire images tracking the temporal  course of iodinated contrast material passing through the cerebral  circulation. Perfusion parameters, such as cerebral blood flow (CBF),  cerebral blood volume (CBV), mean transit time (MTT), etc. are  calculated by [employer] with additional provided perfusion maps and  estimated stroke volumes.  2. Automated exposure control (AEC) protocols are utilized on the  scanner to ensure dose lowered technique.      Comparison: Noncontrast CT brain and brain angiogram dated 4/13/2021  6:47 PM CDT     Findings:     Normal, symmetric and MTT, TTP, CBV and CBF.      Distribution: No perfusion abnormality is demonstrated..     Tmax >6.0 seconds volume: 0 ml     CBF < 30% volume: 0 ml      Mismatch volume: 0 ml      Mismatch ratio: None       Impression:      Impression:  1. Normal, symmetric cerebral perfusion. No discrete infarct detected by  the perfusion software.  This report was finalized on 04/13/2021 19:10 by Dr. Teodoro Mays MD.    CT Head Without Contrast Stroke Protocol [[id number]] Collected: 04/13/21 1831     Updated: 04/13/21 1837    Narrative:      EXAMINATION: CT head without contrast could stroke protocol for 13 2021     DOSE: 1262 mGycm. Automated exposure control was utilized to diminish  patient radiation dose..     HISTORY: Transient difficulty with motion.     FINDINGS: Multiple contiguous axials are obtained from the skull base to  the vertex per protocol without intravenous contrast-enhancement with  reformatted images obtained in the sagittal and coronal projections from  the original data set.     There is evidence of a remote infarct involving the right basal ganglia  with encephalomalacia. There is no evidence of mass, mass effect or  shift of the midline. No evidence of acute infarct or hemorrhage.     Visualized paranasal sinuses and mastoid air cells are normally aerated.  No acute calvarial abnormalities are present.       Impression:      1.. Remote right basal ganglia infarct with focal encephalomalacia.  2. Otherwise unremarkable nonenhanced CT of the brain.  3. The code stroke report was phoned to Dr. Stoner in the emergency  room at 6:31 PM.  This report was finalized on 04/13/2021 18:34 by Dr. Teodoro Mays MD.        I have personally reviewed and interpreted the radiology studies and ECG obtained at time of admission.     Assessment / Plan     Assessment:   Active Hospital Problems    Diagnosis    • **TIA (transient ischemic attack)    • History of stroke, right basal ganglia    • Coronary artery disease involving native coronary artery of native heart    • Hyperlipidemia    • Essential hypertension        Plan:   1.  Admit as observation  2.  Follow  TIA/stroke protocol  3.  MRI of the brain without contrast, lateral ultrasound carotids and echocardiogram in a.m.  4.  Home medications reviewed and restarted as appropriate  5.  DVT prophylaxis with SCDs  6.  Labs in a.m.  7.  Incentive spirometry, supplemental oxygen, continuous pulse oximetry, duo nebs  8.  Neurology saw in the emergency department will follow    I discussed the patient's findings and my recommendations with: Yusuf Keene MD  Time spent: 35 minutes    Patient seen and examined by me on 4/13/2021 at 8:50 PM.    Electronically signed by BEN Blandon, 04/13/21, 22:48 CDT.

## 2021-04-14 NOTE — PLAN OF CARE
Goal Outcome Evaluation:  Plan of Care Reviewed With: patient  Progress: improving  Outcome Summary: PT evaluation completed. The patient presents alert and oriented x4. She demonstrates slight L leg weakness at 4+/5 and she reports numbness in the C7 dermatome B that is chronic. Otherwise she has no focal neurological deficits in strength, sensation, or coordiantion. She is steady during gait and has no further needs for PT at this time. Her O2 saturation is on the low side when on room air and even when fully awake. She hovers around 90%. She reports no breathing difficulty.

## 2021-04-14 NOTE — PLAN OF CARE
Goal Outcome Evaluation:  Plan of Care Reviewed With: patient, other (see comments) (RN)  Progress: no change (Initial Evaluation)       Clinical bedside swallow evaluation completed. The patient was alert and cooperative.     Primary problem: Failed RN dysphagia screening, R labial droop, slurred speech, CXR clear, CT of head with remote R basal ganglia infarct, MRI with acute lacunar infarct L occipital lobe and remote R basal ganglia infarct.   Medical history: CVA, CAD, mixed HLD, HTN, smoker, PVD, ventricular fibrillation, MI.    Oral motor assessment was WFL. Patient is edentulous and dentures are broken and unable to be used per patient report. She reports prior difficulty with food occasionally filling stuck. The patient completed a full range of consistencies except for mech soft. Delayed cough observed inconsistently throughout evaluation. Vocal quality remained strong and unchanged throughout. Patient reports coughing is chronic in nature and due to smoking history. Increased prep time with regular solids due to edentulous status.     SLP recommends:   1) Mechanical soft diet   2) Thin liquids   3) Meds whole as tolerated   4) Oral care and standard swallow precautions    SLP will follow up to ensure diet toleration and to complete speech/language evaluation if warranted.     Stella Osman, MS CCC-SLP 4/14/2021 09:41 CDT

## 2021-04-14 NOTE — PLAN OF CARE
Goal Outcome Evaluation:        Outcome Summary: OT screen completed.  Per PT, pt is independent and has no focal neurological deficits warranting OT at this time.  OT will sign off at this time.

## 2021-04-14 NOTE — ED NOTES
Patient states that at 11 this morning she took a CBD gummy and has not felt right since. Patient states that she felt nauseated and felt like she was drunk.      Judie Lott, RN  04/13/21 0676

## 2021-04-14 NOTE — PLAN OF CARE
Goal Outcome Evaluation:  Plan of Care Reviewed With: patient  Progress: improving  Outcome Summary: Patient denies pain; up with assistance; voiding; IID; Neuros in tact; MRI completed; awaiting echo TBD; may be d/c'd after echo is completed; will contijnue to monitor.

## 2021-04-14 NOTE — NURSING NOTE
Stroke rounding completed. Patient sleeping in bed. I left stroke booklet at bedside. Advised primary nurse stroke booklet was left. Will followup later on patient.     MARIA FERNANDA LuceroN, RN, NREMT  Neuroscience Coordinator

## 2021-04-14 NOTE — PLAN OF CARE
Goal Outcome Evaluation:  Plan of Care Reviewed With: patient  Progress: improving  Outcome Summary: assumed care at 1500, patient A&Ox4, VSS, neuro assessment unchanged. Discharge teaching and paperwork reviewed with second RN, paperwork reviewed with patient, no additional questions at this time. Daughter at bedside to give ride home to patient.

## 2021-04-14 NOTE — ED NOTES
Talked to [facility]er[facility]. Said they would call back when available for report     Haris Hedrick, RN  04/13/21 6113

## 2021-04-14 NOTE — PROGRESS NOTES
Neurology Progress Note      Chief Complaint:  F/u code stroke    Subjective     Subjective:  Sitting up in chair. She states she is back to baseline. She reports a prior stroke in 2007. She was nearly bedridden for 2 months and had MRI brain by her PCP showing prior stroke. She unsure if she had unilateral weakness at that time. She never saw neurology. But feels like she made complete recovery from that. Denies missing doses of  mg recently. She does normally use CBD and states yesterday was first time to try for chronic pain. UDS showed presence of THC and tricyclics. She takes Flexeril for chronic pain.     MRI brain this AM shows acute left occipital stroke. And remote right basal ganglia stroke.     Transthoracic echo to be done.     LDL- 64  TSH- 0.763  A1C- 6.4    Medications:  Current Facility-Administered Medications   Medication Dose Route Frequency Provider Last Rate Last Admin   • acetaminophen (TYLENOL) tablet 650 mg  650 mg Oral Q4H PRN Ria Staples, APRN        Or   • acetaminophen (TYLENOL) 160 MG/5ML solution 650 mg  650 mg Oral Q4H PRN Ria Staples, APRN        Or   • acetaminophen (TYLENOL) suppository 650 mg  650 mg Rectal Q4H PRN Ria Staples, APRN       • atenolol (TENORMIN) tablet 50 mg  50 mg Oral Daily Ria Staples APRN   50 mg at 04/14/21 1009   • atorvastatin (LIPITOR) tablet 80 mg  80 mg Oral Nightly Ria Staples, APRN       • citalopram (CeleXA) tablet 10 mg  10 mg Oral Daily Ria Staples, APRN   10 mg at 04/14/21 1009   • clopidogrel (PLAVIX) tablet 75 mg  75 mg Oral Daily Bijal Pineda, APRN   75 mg at 04/14/21 1224   • gabapentin (NEURONTIN) capsule 300 mg  300 mg Oral TID Ria Staples APRN   300 mg at 04/14/21 1009   • ipratropium-albuterol (DUO-NEB) nebulizer solution 3 mL  3 mL Nebulization 4x Daily - RT Ria Staples, APRN   3 mL at 04/14/21 1022   • meloxicam (MOBIC) tablet 7.5 mg  7.5 mg Oral Daily Ria Staples, APRN    7.5 mg at 04/14/21 1009   • nitroglycerin (NITRODUR) 0.2 MG/HR patch 1 patch  1 patch Transdermal Daily Ria Staples APRN   1 patch at 04/14/21 1010    And   • nitroglycerin (NITRODUR) 0.4 MG/HR patch 1 patch  1 patch Transdermal Daily Ria Staples APRN       • ondansetron (ZOFRAN) tablet 4 mg  4 mg Oral Q6H PRN Ria Staples APRN        Or   • ondansetron (ZOFRAN) injection 4 mg  4 mg Intravenous Q6H PRN Ria Staples APRN       • sodium chloride 0.9 % flush 10 mL  10 mL Intravenous PRN iRa Staples APRN       • sodium chloride 0.9 % flush 10 mL  10 mL Intravenous Q12H Ria Staples APRN   10 mL at 04/14/21 1014   • sodium chloride 0.9 % flush 10 mL  10 mL Intravenous PRN Ria Staples APRN           Review of Systems:   -A 14 point review of systems is completed and is negative except for edentulous. She has dentures but they are broken.       Objective      Vital Signs  Temp:  [97.4 °F (36.3 °C)-98.7 °F (37.1 °C)] 98.7 °F (37.1 °C)  Heart Rate:  [64-82] 71  Resp:  [16-20] 16  BP: (103-131)/(47-72) 119/59    Telemetry: S 66-87      Physical Exam:    General Exam:  Head:  Normocephalic, atraumatic  HEENT:  Neck supple. Edentulous.  Fundoscopic Exam:  No signs of disc edema  CVS:  Regular rate and rhythm.  No murmurs  Carotid Examination:  No bruits  Lungs:  Clear to auscultation  Abdomen:  Nontender, nondistended  Extremities:  No signs of peripheral edema  Skin:  No rashes    Neurologic Exam:    Mental Status:    -Alert, Oriented X 3  -No word-finding difficulties  -No aphasia  -No dysarthria  -Follows simple and complex commands    CN II:  Visual fields full.  Pupils equally reactive to light  CN III, IV, VI:  Extraocular Muscles full with no signs of nystagmus  CN V:  Facial sensory is symmetric with no asymmetries.  CN VII:  Facial motor symmetric  CN VIII:  Gross hearing intact bilaterally  CN IX:  Palate elevates symmetrically  CN X:  Palate elevates symmetrically  CN XI:   Shoulder shrug symmetric  CN XII:  Tongue protrudes to midline  Motor: (strength out of 5:  1= minimal movement, 2 = movement in plane of gravity, 3 = movement against gravity, 4 = movement against some resistance, 5 = full strength)    -Right Upper Ext: Proximal: 5 Distal: 5  -Left Upper Ext: Proximal: 5 Distal: 5    -Right Lower Ext: Proximal: 5 Distal: 5  -Left Lower Ext: Proximal: 5 Distal: 5    DTR:  -Right   Bicep: 2+ Tricep: 2+ Brachioradialis: 2+   Patella: 2+ Ankle: 2+ Neg Babinski  -Left   Bicep: 2+ Tricep: 2+ Brachioradialis: 2+   Patella: 2+ Ankle: 2+ Neg Babinski    Sensory:  -Intact to light touch, pinprick, temperature, pain, and proprioception    Coordination:  -Finger to nose intact  -Heel to shin intact      Gait  -No signs of ataxia  -ambulates unassisted     Results Review:    I reviewed the patient's new clinical results.    Results from last 7 days   Lab Units 04/14/21  0621 04/13/21  1832   WBC 10*3/mm3 5.57 6.95   HEMOGLOBIN g/dL 13.4 13.2   HEMATOCRIT % 42.2 41.7   PLATELETS 10*3/mm3 258 280        Results from last 7 days   Lab Units 04/14/21  0621 04/13/21  1832   SODIUM mmol/L 145 144   POTASSIUM mmol/L 4.1 4.5   CHLORIDE mmol/L 104 105   CO2 mmol/L 31.0* 30.0*   BUN mg/dL 14 17   CREATININE mg/dL 0.56* 0.68   CALCIUM mg/dL 8.8 8.9   BILIRUBIN mg/dL 0.3 0.2   ALK PHOS U/L 160* 176*   ALT (SGPT) U/L 19 22   AST (SGOT) U/L 17 16   GLUCOSE mg/dL 91 102*        Lab Results   Component Value Date    PROTIME 12.9 04/13/2021    INR 1.01 04/13/2021     No components found for: POCGLUC  No components found for: A1C  Lab Results   Component Value Date    HDL 25 (L) 04/14/2021    LDL 64 04/14/2021     No components found for: B12  Lab Results   Component Value Date    TSH 0.763 04/14/2021     CT Angiogram Neck    Result Date: 4/13/2021  1.. There is mild calcific plaquing of the aortic arch. The origin of the great vessels are unremarkable. 2. The right vertebral artery is dominant. There is mild  calcific plaquing involving the proximal intracranial aspect of the right vertebral artery as well as the distal extracranial aspect of both vertebral arteries without evidence of associated rate limiting stenosis. 3. There is mixed plaquing involving the right carotid bulb and proximal ICA. Based on NASCET criteria there is no significant stenosis. The more distal right ICA is widely patent. On the left there is scattered plaquing involving the mid and distal left CCA with a mild stenosis of approximately 25% just below the left carotid bifurcation. There is mixed plaquing involving the carotid bulb and proximal aspect of the left ICA with an approximate 31% cross-sectional stenosis within the proximal left ICA. The more distal left ICA is widely patent. This report was finalized on 04/13/2021 19:44 by Dr. Teodoro Mays MD.    MRI Brain Without Contrast    Result Date: 4/14/2021  1.  Acute lacunar infarct in the left occipital lobe. No hemorrhagic conversion. 2.  Remote right basal ganglia infarct. 3.  Mild chronic microvascular changes. This report was finalized on 04/14/2021 09:05 by Dr. Jessica Arambula MD.    XR Chest 1 View    Result Date: 4/13/2021  1. No acute disease. This report was finalized on 04/13/2021 20:16 by Dr. Teodoro Mays MD.    CT Angiogram Head    Result Date: 4/13/2021  1.. Mild calcific plaquing involving the distal extracranial aspect of both vertebral arteries and the proximal intracranial aspect of the right vertebral artery without evidence of rate limiting stenosis. 2. Persistent fetal origin of the left posterior cerebral artery. There is also a P1 segment on the left contributing to the left posterior cerebral circulation. The posterior circulation is otherwise unremarkable. 3. Mild calcific plaquing involving the cavernous and supraclinoid aspect of both ICAs with mild associated stenosis. The anterior and middle cerebral arteries demonstrate normal enhancement with no evidence  of intraluminal thrombus, focal steno-occlusive disease or discrete berry aneurysm. 4. Normal enhancement of the dural venous sinuses with no evidence of dural venous sinus thrombosis. This report was finalized on 04/13/2021 20:13 by Dr. Teodoro Mays MD.    CT Head Without Contrast Stroke Protocol    Result Date: 4/13/2021  1.. Remote right basal ganglia infarct with focal encephalomalacia. 2. Otherwise unremarkable nonenhanced CT of the brain. 3. The code stroke report was phoned to Dr. Stoner in the emergency room at 6:31 PM. This report was finalized on 04/13/2021 18:34 by Dr. Teodoro Mays MD.    CT CEREBRAL PERFUSION WITH & WITHOUT CONTRAST    Result Date: 4/13/2021  Impression: 1. Normal, symmetric cerebral perfusion. No discrete infarct detected by the perfusion software. This report was finalized on 04/13/2021 19:10 by Dr. Teodoro Mays MD.        MRI brain personally reviewed by me showing remote right basal ganglia stroke and acute left occipital stroke.       Assessment/Plan     Hospital Problem List      Acute lacunar stroke left occipital lobe (CMS/HCC)    TIA (transient ischemic attack)    History of stroke, right basal ganglia    Coronary artery disease involving native coronary artery of native heart    Hyperlipidemia    Essential hypertension    CVA (cerebral vascular accident) (CMS/HCC)    Tobacco use    Impression:  1. Acute left occipital stroke and remote right basal ganglia stroke.   2. CAD with history of V fib.  3. Hypertension  4. Hyperlipidemia. LDL at goal at 64.  4. DM. A1C 6.4  5. Tobacco use.  6. Dysphagia secondary to edentulous    Plan:  1. Discontinue ASA and start Plavix 75 mg daily.  2. Lipitor 80 mg and continue this for 3 months then may lower back to home dose as LDL is at goal at 64.  3. Improve DM   4. Tobacco cessation counseling.  5. Will recommend 14 day ZIO at discharge.  6. ST has recommended mechanical soft diet with thin liquids.  7. Evaluated by therapy and  no PT/OT needs.  8. Ok to discharge after TTE and no abnormalities.  9. F/u neurology in 3 weeks.   10. Counseled on cessation of CBD products. She denies marijuana use.         Bijal Pineda, APRN  04/14/21  12:43 CDT

## 2021-04-14 NOTE — THERAPY EVALUATION
Acute Care - Speech Language Pathology   Swallow Initial Evaluation [facility]     Patient Name: Loren Doll  : 1960  MRN: [id number]  Today's Date: 2021               Admit Date: 2021  Clinical bedside swallow evaluation completed. The patient was alert and cooperative.     Primary problem: Failed RN dysphagia screening, R labial droop, slurred speech, CXR clear, CT of head with remote R basal ganglia infarct, MRI with acute lacunar infarct L occipital lobe and remote R basal ganglia infarct.   Medical history: CVA, CAD, mixed HLD, HTN, smoker, PVD, ventricular fibrillation, MI.    Oral motor assessment was WFL. Patient is edentulous and dentures are broken and unable to be used per patient report. She reports prior difficulty with food occasionally filling stuck. The patient completed a full range of consistencies except for mech soft. Delayed cough observed inconsistently throughout evaluation. Vocal quality remained strong and unchanged throughout. Patient reports coughing is chronic in nature and due to smoking history. Increased prep time with regular solids due to edentulous status.     SLP recommends:   1) Mechanical soft diet   2) Thin liquids   3) Meds whole as tolerated   4) Oral care and standard swallow precautions    SLP will follow up to ensure diet toleration and to complete speech/language evaluation if warranted.     Stella Osman MS CCC-SLP 2021 09:43 CDT    Visit Dx:     ICD-10-CM ICD-9-CM   1. TIA (transient ischemic attack)  G45.9 435.9   2. Dysphagia, unspecified type  R13.10 787.20     Patient Active Problem List   Diagnosis   • TIA (transient ischemic attack)   • History of stroke, right basal ganglia   • Coronary artery disease involving native coronary artery of native heart   • Hyperlipidemia   • Essential hypertension     Past Medical History:   Diagnosis Date   • Coronary artery disease    • Hyperlipidemia    • Hypertension    • Myocardial infarction  (CMS/[facility])    • Peripheral vascular disease (CMS/[facility])    • Stroke (CMS/[facility])    • Tobacco dependence    • VF (ventricular fibrillation) (CMS/[facility])      Past Surgical History:   Procedure Laterality Date   •  SECTION     • CHOLECYSTECTOMY          SWALLOW EVALUATION (last 72 hours)      SLP Adult Swallow Evaluation     Row Name 21 0920                   Rehab Evaluation    Document Type  evaluation  -MM        Subjective Information  no complaints  -MM        Patient Observations  alert;cooperative;agree to therapy  -MM        Patient/Family/Caregiver Comments/Observations  No family present.  -MM        Care Plan Review  evaluation/treatment results reviewed  -MM        Care Plan Review, Other Participant(s)  other (see comments) RN   -MM        Patient Effort  good  -MM        Symptoms Noted During/After Treatment  none  -MM           General Information    Patient Profile Reviewed  yes  -MM        Pertinent History Of Current Problem  Primary problem: Failed RN dysphagia screening, R labial droop, slurred speech, CXR clear, CT of head with remote R basal ganglia infarct, MRI with acute lacunar infarct L occipital lobe and remote R basal ganglia infarct. Medical history: CVA, CAD, mixed HLD, HTN, smoker, PVD, ventricular fibrillation, MI.  -MM        Current Method of Nutrition  NPO  -MM        Precautions/Limitations, Vision  WFL;for purposes of eval  -MM        Precautions/Limitations, Hearing  WFL;for purposes of eval  -MM        Prior Level of Function-Communication  WFL  -MM        Prior Level of Function-Swallowing  no diet consistency restrictions  -MM        Plans/Goals Discussed with  patient;other (see comments);agreed upon RN   -MM        Barriers to Rehab  none identified  -MM        Patient's Goals for Discharge  return to PO diet  -MM           Pain    Additional Documentation  Pain Scale: FACES Pre/Post-Treatment (Group)  -MM           Pain Scale: FACES Pre/Post-Treatment    Pain:  FACES Scale, Pretreatment  0-->no hurt  -MM        Posttreatment Pain Rating  0-->no hurt  -MM           Oral Motor Structure and Function    Dentition Assessment  edentulous, does not have dentures broken   -MM        Secretion Management  WNL/WFL  -MM        Mucosal Quality  moist, healthy  -MM        Volitional Swallow  WFL  -MM        Volitional Cough  WFL  -MM           Oral Musculature and Cranial Nerve Assessment    Oral Motor General Assessment  WFL  -MM           General Eating/Swallowing Observations    Respiratory Support Currently in Use  nasal cannula  -MM        Eating/Swallowing Skills  fed by SLP  -MM        Positioning During Eating  upright in bed  -MM        Utensils Used  spoon;straw  -MM        Consistencies Trialed  regular textures;honey-thick liquids;nectar/syrup-thick liquids;thin liquids;pureed  -MM           Clinical Swallow Eval    Oral Prep Phase  impaired  -MM        Oral Transit  WFL  -MM        Oral Residue  WFL  -MM        Pharyngeal Phase  -- Cannot rule out   -MM        Esophageal Phase  unremarkable reports food sticking occasionally   -MM        Clinical Swallow Evaluation Summary  See note  -MM           Clinical Impression    SLP Swallowing Diagnosis  R/O pharyngeal dysphagia  -MM        Functional Impact  risk of aspiration/pneumonia  -MM        Rehab Potential/Prognosis, Swallowing  good, to achieve stated therapy goals  -MM        Swallow Criteria for Skilled Therapeutic Interventions Met  demonstrates skilled criteria  -MM           Recommendations    Therapy Frequency (Swallow)  at least;PRN  -MM        Predicted Duration Therapy Intervention (Days)  until discharge  -MM        SLP Diet Recommendation  soft textures;ground;thin liquids  -MM        Recommended Diagnostics  SLE/Cog/Motor Speech Evaluation  -MM        Recommended Precautions and Strategies  upright posture during/after eating;small bites of food and sips of liquid;general aspiration precautions  -MM         Oral Care Recommendations  Oral Care BID/PRN;Toothbrush  -MM        SLP Rec. for Method of Medication Administration  meds whole;as tolerated  -MM        Monitor for Signs of Aspiration  yes;notify SLP if any concerns  -MM        Anticipated Discharge Disposition (SLP)  unknown  -MM           Swallow Goals (SLP)    Oral Nutrition/Hydration Goal Selection (SLP)  oral nutrition/hydration, SLP goal 1  -MM           Oral Nutrition/Hydration Goal 1 (SLP)    Oral Nutrition/Hydration Goal 1, SLP  Patient will tolerate LRD without s/s of aspiration.  -MM        Time Frame (Oral Nutrition/Hydration Goal 1, SLP)  by discharge  -MM        Barriers (Oral Nutrition/Hydration Goal 1, SLP)  none  -MM        Progress/Outcomes (Oral Nutrition/Hydration Goal 1, SLP)  goal ongoing  -MM          User Key  (r) = Recorded By, (t) = Taken By, (c) = Cosigned By    Initials Name Effective Dates    Stella Rosa, MS CCC-SLP 07/12/20 -           EDUCATION  The patient has been educated in the following areas:   Dysphagia (Swallowing Impairment) Oral Care/Hydration Modified Diet Instruction.    SLP Recommendation and Plan  SLP Swallowing Diagnosis: R/O pharyngeal dysphagia  SLP Diet Recommendation: soft textures, ground, thin liquids  Recommended Precautions and Strategies: upright posture during/after eating, small bites of food and sips of liquid, general aspiration precautions  SLP Rec. for Method of Medication Administration: meds whole, as tolerated     Monitor for Signs of Aspiration: yes, notify SLP if any concerns  Recommended Diagnostics: SLE/Cog/Motor Speech Evaluation  Swallow Criteria for Skilled Therapeutic Interventions Met: demonstrates skilled criteria  Anticipated Discharge Disposition (SLP): unknown  Rehab Potential/Prognosis, Swallowing: good, to achieve stated therapy goals  Therapy Frequency (Swallow): at least, PRN  Predicted Duration Therapy Intervention (Days): until discharge                         Plan of  Care Reviewed With: patient, other (see comments) (RN)  Progress: no change (Initial Evaluation)    SLP GOALS     Row Name 04/14/21 0920             Oral Nutrition/Hydration Goal 1 (SLP)    Oral Nutrition/Hydration Goal 1, SLP  Patient will tolerate LRD without s/s of aspiration.  -MM      Time Frame (Oral Nutrition/Hydration Goal 1, SLP)  by discharge  -MM      Barriers (Oral Nutrition/Hydration Goal 1, SLP)  none  -MM      Progress/Outcomes (Oral Nutrition/Hydration Goal 1, SLP)  goal ongoing  -MM        User Key  (r) = Recorded By, (t) = Taken By, (c) = Cosigned By    Initials Name Provider Type    Stella Rosa MS CCC-SLP Speech and Language Pathologist             Time Calculation:   Time Calculation- SLP     Row Name 04/14/21 0942             Time Calculation- SLP    SLP Start Time  0920  -MM      SLP Stop Time  1015  -MM      SLP Time Calculation (min)  55 min  -MM      SLP Received On  04/14/21  -MM      SLP Goal Re-Cert Due Date  04/24/21  -MM        User Key  (r) = Recorded By, (t) = Taken By, (c) = Cosigned By    Initials Name Provider Type    Stella Rosa MS CCC-SLP Speech and Language Pathologist          Therapy Charges for Today     Code Description Service Date Service Provider Modifiers Qty    [id number] HC ST EVAL ORAL PHARYNG SWALLOW 4 4/14/2021 Stella Osman MS CCC-SLP GN 1               Stella Osman MS CCC-SLP  4/14/2021

## 2021-04-15 ENCOUNTER — READMISSION MANAGEMENT (OUTPATIENT)
Dept: CALL CENTER | Facility: HOSPITAL | Age: 61
End: 2021-04-15

## 2021-04-15 NOTE — PAYOR COMM NOTE
"[facility]  JAYME,  [phone]  OR  FAX   [phone]    Chuy Doll (61 y.o. Female)     Date of Birth Social Security Number Address Home Phone MRN    1960  [id number]  [facility] [address] [phone] [id number]    Congregation Marital Status          Jewish        Admission Date Admission Type Admitting Provider Attending Provider Department, Room/Bed    4/13/21 Emergency Yusuf Keene MD  [facility] 3A, 325/1    Discharge Date Discharge Disposition Discharge Destination        4/14/2021 Home or Self Care              Attending Provider: (none)   Allergies: No Known Allergies    Isolation: None   Infection: None   Code Status: Prior    Ht: 152.4 cm (60\")   Wt: 89 kg (196 lb 3.2 oz)    Admission Cmt: None   Principal Problem: Acute lacunar stroke left occipital lobe (CMS/HCC) [I63.81]                 Active Insurance as of 4/13/2021     Primary Coverage     Payor Plan Insurance Group Employer/Plan Group    MEDICARE MEDICARE A & B      Payor Plan Address Payor Plan Phone Number Payor Plan Fax Number Effective Dates    [address] [phone]  4/1/2013 - None Entered    [facility] [id number]       Subscriber Name Subscriber Birth Date Member ID       CHUY DOLL 1960 [id number]           Secondary Coverage     Payor Plan Insurance Group Employer/Plan Group    [employer] [place]      Payor Plan Address Payor Plan Phone Number Payor Plan Fax Number Effective Dates    [address] [address] [phone]  4/13/2021 - None Entered    [facility] [id number]       Subscriber Name Subscriber Birth Date Member ID       CHUY DOLL 1960 [id number]                 Emergency Contacts      (Rel.) Home Phone Work Phone Mobile Phone    CRISTINE NGUYỄN (Daughter) -- -- [phone]               Discharge Summary      Yumiko Santiago, APRN at 04/14/21 1452     Attestation signed by Ang Acosta DO at " 04/14/21 3416    I personally evaluated and examined the patient in conjunction with BEN Cain and agree with the assessment, treatment plan, and disposition of the patient as recorded by her. My history, exam, and further recommendations are:     Seen and evaluated.  Agree with discharge plan.    Electronically signed by Ang Acosta DO, 4/14/2021, 17:05 CDT.                        [facility] Medicine Services  DISCHARGE SUMMARY     Date of Admission: 4/13/2021  Date of Discharge:  4/14/2021  Primary Care Physician: Nolan Hoffman Jr., MD    Presenting Problem/History of Present Illness:  Presented with right facial drooping and slurred speech    Discharge Diagnoses:  Active Hospital Problems    Diagnosis    • **Acute lacunar stroke left occipital lobe (CMS/HCC)    • CVA (cerebral vascular accident) (CMS/HCC)    • Tobacco use    • History of stroke, right basal ganglia    • Coronary artery disease involving native coronary artery of native heart    • Hyperlipidemia    • Essential hypertension      Chief Complaint on Day of Discharge: No complaints    History of Present Illness on Day of Discharge:   Patient is lying in bed and reports that she is at baseline.  Denies dizziness, word finding issues, facial asymmetry, facial drooping, weakness of extremities and reports that she is at baseline.  I discussed discontinuation of aspirin and starting Plavix 75 mg daily.  Also discussed increasing Lipitor to 80 mg nightly for 3 months and will return to Lipitor 20 mg nightly.  Discussed smoking cessation with patient.  She tells me she wears nicotine patches at home.    Consults: Dr. Alan Mobley, neurology    Procedures Performed:   Transthoracic echocardiogram 4/14/2021  · Left ventricular ejection fraction appears to be 61 - 65%. Left ventricular systolic function is normal.  · Left ventricular diastolic dysfunction is noted.  · Normal right ventricular cavity size  and systolic function noted.  · There is no significant (greater than mild) valvular dysfunction.  · There is no evidence of right-to-left shunt on bubble study.  · There is no evidence of intracardiac mass or thrombus    Pertinent Test Results:     Laboratory Data Last 7 Days:  Results from last 7 days   Lab Units 04/14/21  0621 04/13/21  1832   WBC 10*3/mm3 5.57 6.95   HEMOGLOBIN g/dL 13.4 13.2   HEMATOCRIT % 42.2 41.7   PLATELETS 10*3/mm3 258 280     Results from last 7 days   Lab Units 04/14/21  0621 04/13/21  1832   SODIUM mmol/L 145 144   POTASSIUM mmol/L 4.1 4.5   CHLORIDE mmol/L 104 105   CO2 mmol/L 31.0* 30.0*   BUN mg/dL 14 17   CREATININE mg/dL 0.56* 0.68   GLUCOSE mg/dL 91 102*   CALCIUM mg/dL 8.8 8.9   ALT (SGPT) U/L 19 22     Laboratory Data Last 7 Days:    Lab Results (last 7 days)     Procedure Component Value Units Date/Time    Vitamin B12 [[id number]]  (Normal) Collected: 04/14/21 0621    Specimen: Blood Updated: 04/14/21 1431     Vitamin B-12 372 pg/mL     Narrative:      Results may be falsely increased if patient taking Biotin.      Hemoglobin A1c [[id number]]  (Abnormal) Collected: 04/14/21 0621    Specimen: Blood Updated: 04/14/21 0705     Hemoglobin A1C 6.40 %     Narrative:      Hemoglobin A1C Ranges:    Increased Risk for Diabetes  5.7% to 6.4%  Diabetes                     >= 6.5%  Diabetic Goal                < 7.0%    Lipid Panel [[id number]]  (Abnormal) Collected: 04/14/21 0621    Specimen: Blood Updated: 04/14/21 0658     Total Cholesterol 111 mg/dL      Triglycerides 122 mg/dL      HDL Cholesterol 25 mg/dL      LDL Cholesterol  64 mg/dL      VLDL Cholesterol 22 mg/dL      LDL/HDL Ratio 2.46    Narrative:          Comprehensive Metabolic Panel [[id number]]  (Abnormal) Collected: 04/14/21 0621    Specimen: Blood Updated: 04/14/21 0658     Glucose 91 mg/dL      BUN 14 mg/dL      Creatinine 0.56 mg/dL      Sodium 145 mmol/L      Potassium 4.1 mmol/L      Comment: Slight hemolysis detected by  analyzer. Results may be affected.        Chloride 104 mmol/L      CO2 31.0 mmol/L      Calcium 8.8 mg/dL      Total Protein 6.6 g/dL      Albumin 3.80 g/dL      ALT (SGPT) 19 U/L      AST (SGOT) 17 U/L      Alkaline Phosphatase 160 U/L      Total Bilirubin 0.3 mg/dL      eGFR Non African Amer 110 mL/min/1.73      Globulin 2.8 gm/dL      A/G Ratio 1.4 g/dL      BUN/Creatinine Ratio 25.0     Anion Gap 10.0 mmol/L     Narrative:      GFR Normal >60  Chronic Kidney Disease <60  Kidney Failure <15      TSH [[id number]]  (Normal) Collected: 04/14/21 0621    Specimen: Blood Updated: 04/14/21 0658     TSH 0.763 uIU/mL     CBC (No Diff) [[id number]]  (Abnormal) Collected: 04/14/21 0621    Specimen: Blood Updated: 04/14/21 0629     WBC 5.57 10*3/mm3      RBC 5.07 10*6/mm3      Hemoglobin 13.4 g/dL      Hematocrit 42.2 %      MCV 83.2 fL      MCH 26.4 pg      MCHC 31.8 g/dL      RDW 15.3 %      RDW-SD 45.6 fl      MPV 9.2 fL      Platelets 258 10*3/mm3     POC Glucose Once [[id number]]  (Normal) Collected: 04/13/21 2344    Specimen: Blood Updated: 04/13/21 2358     Glucose 95 mg/dL      Comment: : [id number] Quinn KristaMeter ID: [id number]       COVID-19, ABBOTT IN-HOUSE,NASAL Swab (NO TRANSPORT MEDIA) 2 HR TAT - Swab, Nasopharynx [[id number]]  (Normal) Collected: 04/13/21 2058    Specimen: Swab from Nasopharynx Updated: 04/13/21 2126     COVID19 Presumptive Negative    Narrative:      Fact sheet for providers: https://www.fda.gov/media/424256/download     Fact sheet for patients: https://www.fda.gov/media/283712/download    Test performed by PCR.  If inconsistent with clinical signs and symptoms patient should be tested with different authorized molecular test.    Urine Drug Screen - Urine, Clean Catch [[id number]]  (Abnormal) Collected: 04/13/21 2022    Specimen: Urine, Clean Catch Updated: 04/13/21 2056     THC, Screen, Urine Positive     Phencyclidine (PCP), Urine Negative     Cocaine Screen, Urine Negative      Methamphetamine, Ur Negative     Opiate Screen Negative     Amphetamine Screen, Urine Negative     Benzodiazepine Screen, Urine Negative     Tricyclic Antidepressants Screen Positive     Methadone Screen, Urine Negative     Barbiturates Screen, Urine Negative     Oxycodone Screen, Urine Negative     Propoxyphene Screen Negative     Buprenorphine, Screen, Urine Negative    Narrative:      Cutoff For Drugs Screened:    Amphetamines               500 ng/ml  Barbiturates               200 ng/ml  Benzodiazepines            150 ng/ml  Cocaine                    150 ng/ml  Methadone                  200 ng/ml  Opiates                    100 ng/ml  Phencyclidine               25 ng/ml  THC                            50 ng/ml  Methamphetamine            500 ng/ml  Tricyclic Antidepressants  300 ng/ml  Oxycodone                  100 ng/ml  Propoxyphene               300 ng/ml  Buprenorphine               10 ng/ml    The normal value for all drugs tested is negative. This report includes unconfirmed screening results, with the cutoff values listed, to be used for medical treatment purposes only.  Unconfirmed results must not be used for non-medical purposes such as employment or legal testing.  Clinical consideration should be applied to any drug of abuse test, particularly when unconfirmed results are used.      Urinalysis With Culture If Indicated - Urine, Clean Catch [[id number]]  (Abnormal) Collected: 04/13/21 2023    Specimen: Urine, Clean Catch Updated: 04/13/21 2032     Color, UA Yellow     Appearance, UA Clear     pH, UA 6.0     Specific Gravity, UA >1.030     Glucose, UA Negative     Ketones, UA Negative     Bilirubin, UA Negative     Blood, UA Negative     Protein, UA Negative     Leuk Esterase, UA Negative     Nitrite, UA Negative     Urobilinogen, UA 1.0 E.U./dL    Narrative:      Urine microscopic not indicated.    Superior Draw [[id number]] Collected: 04/13/21 1832    Specimen: Blood Updated: 04/13/21 1945     Narrative:      The following orders were created for panel order [place] Draw.  Procedure                               Abnormality         Status                     ---------                               -----------         ------                     Light Blue Top[[id number]]                                   Final result               Green Top (Gel)[[id number]]                                  Final result               Lavender Top[[id number]]                                     Final result               Red Top[[id number]]                                          Final result                 Please view results for these tests on the individual orders.    Green Top (Gel) [[id number]] Collected: 04/13/21 1832    Specimen: Blood Updated: 04/13/21 1945     Extra Tube Hold for add-ons.     Comment: Auto resulted.       Red Top [[id number]] Collected: 04/13/21 1832    Specimen: Blood Updated: 04/13/21 1945     Extra Tube Hold for add-ons.     Comment: Auto resulted.       Light Blue Top [[id number]] Collected: 04/13/21 1832    Specimen: Blood Updated: 04/13/21 1945     Extra Tube hold for add-on     Comment: Auto resulted       Lavender Top [[id number]] Collected: 04/13/21 1832    Specimen: Blood Updated: 04/13/21 1945     Extra Tube hold for add-on     Comment: Auto resulted       Ethanol [[id number]] Collected: 04/13/21 1832    Specimen: Blood Updated: 04/13/21 1916     Ethanol % <0.010 %     Narrative:      Not for legal purposes. Chain of Custody not followed.     TSH [[id number]]  (Normal) Collected: 04/13/21 1832    Specimen: Blood Updated: 04/13/21 1914     TSH 0.813 uIU/mL     Comprehensive Metabolic Panel [[id number]]  (Abnormal) Collected: 04/13/21 1832    Specimen: Blood Updated: 04/13/21 1909     Glucose 102 mg/dL      BUN 17 mg/dL      Creatinine 0.68 mg/dL      Sodium 144 mmol/L      Potassium 4.5 mmol/L      Chloride 105 mmol/L      CO2 30.0 mmol/L      Calcium 8.9 mg/dL      Total Protein 6.7 g/dL       Albumin 4.00 g/dL      ALT (SGPT) 22 U/L      AST (SGOT) 16 U/L      Alkaline Phosphatase 176 U/L      Total Bilirubin 0.2 mg/dL      eGFR Non African Amer 88 mL/min/1.73      eGFR  African Amer 106 mL/min/1.73      Globulin 2.7 gm/dL      A/G Ratio 1.5 g/dL      BUN/Creatinine Ratio 25.0     Anion Gap 9.0 mmol/L     Narrative:      GFR Normal >60  Chronic Kidney Disease <60  Kidney Failure <15      Protime-INR [[id number]]  (Normal) Collected: 04/13/21 1832    Specimen: Blood Updated: 04/13/21 1858     Protime 12.9 Seconds      INR 1.01    CBC & Differential [[id number]]  (Abnormal) Collected: 04/13/21 1832    Specimen: Blood Updated: 04/13/21 1849    Narrative:      The following orders were created for panel order CBC & Differential.  Procedure                               Abnormality         Status                     ---------                               -----------         ------                     CBC Auto Differential[[id number]]        Abnormal            Final result                 Please view results for these tests on the individual orders.    CBC Auto Differential [[id number]]  (Abnormal) Collected: 04/13/21 1832    Specimen: Blood Updated: 04/13/21 1849     WBC 6.95 10*3/mm3      RBC 5.01 10*6/mm3      Hemoglobin 13.2 g/dL      Hematocrit 41.7 %      MCV 83.2 fL      MCH 26.3 pg      MCHC 31.7 g/dL      RDW 15.2 %      RDW-SD 45.6 fl      MPV 9.3 fL      Platelets 280 10*3/mm3      Neutrophil % 59.7 %      Lymphocyte % 32.7 %      Monocyte % 6.6 %      Eosinophil % 0.1 %      Basophil % 0.6 %      Immature Grans % 0.3 %      Neutrophils, Absolute 4.15 10*3/mm3      Lymphocytes, Absolute 2.27 10*3/mm3      Monocytes, Absolute 0.46 10*3/mm3      Eosinophils, Absolute 0.01 10*3/mm3      Basophils, Absolute 0.04 10*3/mm3      Immature Grans, Absolute 0.02 10*3/mm3      nRBC 0.0 /100 WBC         Imaging Results (All)     Procedure Component Value Units Date/Time    MRI Brain Without Contrast [[id number]]  Collected: 04/14/21 0857     Updated: 04/14/21 0908    Narrative:      EXAM: MR BRAIN WITHOUT IV CONTRAST 4/14/2021     COMPARISON: Head CT dated 4/13/2021      HISTORY: 61 years-old Female. Stroke      TECHNIQUE:   Routine pulse sequences of the brain were obtained without IV contrast.      REPORT:     Focus of diffusion restriction in the left occipital lobe, in an area  measuring 1 cm, consistent with acute lacunar infarct. There is no  evidence of hemorrhagic conversion.     Remote lacunar infarct in the right basal ganglia.     Mild chronic microvascular changes.     Mild generalized cerebral volume loss.     The flow-voids of [place] are maintained centrally.     The sella, parasellar region, brainstem and cerebellum demonstrate no  acute findings.     The basal cisterns are preserved.     The intraorbital structures demonstrate no acute findings.     Noncoalescent mastoid effusions, right greater than left.     The paranasal sinuses are free of obstructive disease.             Impression:      1.  Acute lacunar infarct in the left occipital lobe. No hemorrhagic  conversion.  2.  Remote right basal ganglia infarct.  3.  Mild chronic microvascular changes.  This report was finalized on 04/14/2021 09:05 by Dr. Jessica Arambula MD.    XR Chest 1 View [[id number]] Collected: 04/13/21 2015     Updated: 04/13/21 2019    Narrative:      EXAMINATION: Chest 1 view 4/13/2021     HISTORY: Stroke protocol     FINDINGS: Upright frontal projection of chest demonstrates a right  basilar granuloma as well as calcified right perihilar lymph nodes.  Lungs are otherwise clear. There is no effusion or free air present.       Impression:      1. No acute disease.  This report was finalized on 04/13/2021 20:16 by Dr. Teodoro Mays MD.    CT Angiogram Head [[id number]] Collected: 04/13/21 2004     Updated: 04/13/21 2016    Narrative:      EXAMINATION: CT angiogram of the head 4/13/2021     DOSE: 182.5 mGycm. Automated  exposure control was utilized to diminish  patient radiation dose..     HISTORY: Weakness now resolving.     FINDINGS: CT angiography was performed of the [place] following  intravenous contrast administration with reformatted images obtained in  the sagittal and coronal projections from the original data set as well  as three-dimensional reconstructions. MIPS are also obtained.     There is mild calcific plaquing involving the distal extracranial aspect  of both vertebral arteries and the proximal intracranial aspect of the  right vertebral artery without evidence of rate limiting stenosis. The  right vertebral artery is dominant.     The posterior inferior cerebellar arteries are patent. The basilar  artery is normal in caliber. The superior cerebellar arteries and  posterior cerebral arteries are widely patent. There is a persistent  fetal origin of the left posterior cerebral artery.     Both distal ICAs are widely patent. There is calcific plaquing involving  the cavernous segment of both ICAs as well as calcific plaquing  involving the supraclinoid aspect of both ICAs with mild associated  stenosis. The anterior and middle cerebral arteries are normal in  caliber without evidence of focal stenosis or discrete berry aneurysm.     There is no evidence of dural venous sinus thrombosis.       Impression:      1.. Mild calcific plaquing involving the distal extracranial aspect of  both vertebral arteries and the proximal intracranial aspect of the  right vertebral artery without evidence of rate limiting stenosis.  2. Persistent fetal origin of the left posterior cerebral artery. There  is also a P1 segment on the left contributing to the left posterior  cerebral circulation. The posterior circulation is otherwise  unremarkable.  3. Mild calcific plaquing involving the cavernous and supraclinoid  aspect of both ICAs with mild associated stenosis. The anterior and  middle cerebral arteries demonstrate  normal enhancement with no evidence  of intraluminal thrombus, focal steno-occlusive disease or discrete  berry aneurysm.  4. Normal enhancement of the dural venous sinuses with no evidence of  dural venous sinus thrombosis.  This report was finalized on 04/13/2021 20:13 by Dr. Teodoro Mays MD.    CT Angiogram Neck [[id number]] Collected: 04/13/21 1931     Updated: 04/13/21 1947    Narrative:      EXAMINATION: CT angiogram of the neck 4/13/2021     HISTORY: Code stroke.     DOSE: 182.5 mGycm. Automated exposure control was utilized to diminish  patient radiation dose..     FINDINGS: Multiple contiguous axials are obtained from the aortic arch  through the skull base at 1 mm intervals following intravenous contrast  infusion with reformatted images obtained in the sagittal and coronal  projections from the original data set as well as MIPS.     There are changes of centrilobular emphysema within the lung apices.  There is atheromatous calcification of the aortic arch. The origin of  the great vessels are unremarkable. The right vertebral artery is  dominant.     There is diffuse enlargement of the thyroid gland with heterogeneity  within the right lobe suggesting a nodule measuring approximately 1.3 cm  in size. This may be related to a multinodular goiter. The level of the  true and false cords is unremarkable. No enlarged cervical chain or  posterior triangle lymphadenopathy is demonstrated. The nasopharynx and  lateral parapharyngeal spaces are symmetric with no mass or mass effect.  Visualized orbits as well as the paranasal sinuses and mastoid air cells  are normally aerated.     As previously noted the right vertebral artery is dominant. There is  mild calcific plaquing at the distal extracranial aspect of the left  vertebral artery and right vertebral artery as well as within the  proximal intracranial aspect of the right vertebral artery without  evidence of rate limiting stenosis.     Examination of the  right common carotid artery demonstrates no focal  stenosis or plaquing to the level of the carotid bifurcation. There is  mixed plaquing involving the carotid bulb and proximal ICA without  evidence of a hemodynamically significant stenosis utilizing NASCET  criteria.     Examination of the left common carotid artery demonstrates mild  scattered calcific plaquing involving the mid segment of the CCA with an  approximate 25% cross-sectional stenosis within the distal CCA just  proximal to the carotid bifurcation. There is mixed plaquing involving  the carotid bulb and extending into the proximal aspect of the left ICA  resulting in a 31% cross-sectional stenosis based on NASCET criteria.  The more distal ICAs are widely patent.       Impression:      1.. There is mild calcific plaquing of the aortic arch. The origin of  the great vessels are unremarkable.  2. The right vertebral artery is dominant. There is mild calcific  plaquing involving the proximal intracranial aspect of the right  vertebral artery as well as the distal extracranial aspect of both  vertebral arteries without evidence of associated rate limiting  stenosis.  3. There is mixed plaquing involving the right carotid bulb and proximal  ICA. Based on NASCET criteria there is no significant stenosis. The more  distal right ICA is widely patent. On the left there is scattered  plaquing involving the mid and distal left CCA with a mild stenosis of  approximately 25% just below the left carotid bifurcation. There is  mixed plaquing involving the carotid bulb and proximal aspect of the  left ICA with an approximate 31% cross-sectional stenosis within the  proximal left ICA. The more distal left ICA is widely patent.  This report was finalized on 04/13/2021 19:44 by Dr. Teodoro Mays MD.    CT CEREBRAL PERFUSION WITH & WITHOUT CONTRAST [[id number]] Collected: 04/13/21 1908     Updated: 04/13/21 1913    Narrative:      Indication: Slurred speech. Weakness  now resolving.     Exam:      1. Perfusion CT is performed to acquire images tracking the temporal  course of iodinated contrast material passing through the cerebral  circulation. Perfusion parameters, such as cerebral blood flow (CBF),  cerebral blood volume (CBV), mean transit time (MTT), etc. are  calculated by [employer] with additional provided perfusion maps and  estimated stroke volumes.  2. Automated exposure control (AEC) protocols are utilized on the  scanner to ensure dose lowered technique.      Comparison: Noncontrast CT brain and brain angiogram dated 4/13/2021  6:47 PM CDT     Findings:     Normal, symmetric and MTT, TTP, CBV and CBF.      Distribution: No perfusion abnormality is demonstrated..     Tmax >6.0 seconds volume: 0 ml     CBF < 30% volume: 0 ml     Mismatch volume: 0 ml      Mismatch ratio: None       Impression:      Impression:  1. Normal, symmetric cerebral perfusion. No discrete infarct detected by  the perfusion software.  This report was finalized on 04/13/2021 19:10 by Dr. Teodoro Mays MD.    CT Head Without Contrast Stroke Protocol [[id number]] Collected: 04/13/21 1831     Updated: 04/13/21 1837    Narrative:      EXAMINATION: CT head without contrast could stroke protocol for 13 2021     DOSE: 1262 mGycm. Automated exposure control was utilized to diminish  patient radiation dose..     HISTORY: Transient difficulty with motion.     FINDINGS: Multiple contiguous axials are obtained from the skull base to  the vertex per protocol without intravenous contrast-enhancement with  reformatted images obtained in the sagittal and coronal projections from  the original data set.     There is evidence of a remote infarct involving the right basal ganglia  with encephalomalacia. There is no evidence of mass, mass effect or  shift of the midline. No evidence of acute infarct or hemorrhage.     Visualized paranasal sinuses and mastoid air cells are normally aerated.  No acute calvarial  abnormalities are present.       Impression:      1.. Remote right basal ganglia infarct with focal encephalomalacia.  2. Otherwise unremarkable nonenhanced CT of the brain.  3. The code stroke report was phoned to Dr. Stoner in the emergency  room at 6:31 PM.  This report was finalized on 04/13/2021 18:34 by Dr. Teodoro Mays MD.        Hospital Course  Patient is a 61 y.o. female presented to [facility] emergency room 4/13/21 with complaints of right facial droop, slurred speech.  She has a history of previous stroke, coronary artery disease, hyperlipidemia, hypertension, tobacco dependence, peripheral vascular disease ventricular fibrillation.  Patient reported using CBD candy at 11 AM.  She reported she threw the medication up and was dizzy and was not feeling well.  She laid down for nap at 2 PM.  Patient reported she got up at 4 PM and her daughter thought that she was normal then she went to a barbecue and was not there long before she began to walk funny leaning to the right and head tilted and turned to the left.  Daughter thought this was at 6:15 but EMS reported it was at 5 PM.  Patient reported feeling dizzy, lightheaded and drunk feeling.  Daughter noted that patient was not talking right and had a mild right facial drooping.  EMS also noted right facial drooping.  Upon arrival nursing staff noted patient would read a sentence given to her and miss many of the words.  She had decreased sensation on the right.  Code stroke called as patient dysarthric and she was evaluated by Dr. Mobley neurology.  Stat CT scan of the head showed remote right basal ganglia infarct with focal encephalomalacia's.  Otherwise unremarkable.  Urine drug screen positive for THC tricyclic antidepressants.  Chest x-ray no active disease.  CT angiogram of the head noted mild calcific plaquing involving the distal extracranial aspect of both vertebral arteries and proximal intracranial aspect of right vertebral  artery without evidence of rate limiting stenosis.  Persistent fetal origin of the left posterior cerebral artery.  P1 segment on the left contributing to the left posterior cerebral circumflex.  Posterior circulation otherwise unremarkable.  Mild calcific plaquing involving the cavernous and supraclinoid aspect of both ICAs and mild associated stenosis.  Anterior and middle cerebral arteries demonstrate normal enhancement with no evidence of intraluminal thrombus, focal stenosis occlusive disease or discrete berry aneurysm.  No evidence of dural venous sinus thrombosis.  CT angiogram of the neck noted mixed plaquing involving the right carotid bulb and proximal ICA.  Based onNASCET criteria there is no significant stenosis.  The more distal right ICA widely patent.  Left scattered plaquing involving the mid and distal left CCA with mild stenosis of approximately 25% below left carotid bifurcation.  Mixed plaquing involving the carotid bulb and proximal aspect of the left ICA with approximate 31% cross-sectional stenosis within the left ICA.  Distal left ICA patent.    She was admitted to the neurology floor with strokelike symptoms that resolved compatible with TIA per neurology.  Dr. Mobley noted no TPA as symptoms resolved, multiple medications may have contributed to symptoms.  Recommendations to proceed with MRI, cardiac monitoring, aspirin and Lipitor.  She was placed on telemetry and remained normal sinus rhythm 64-87.  SCDs for deep vein thrombosis prophylaxis.  Aspirin 81 mg and Lipitor 80 mg nightly initiated.  Ongoing work-up included MRI of the brain that showed acute lacunar infarct in the left occipital lobe.  No hemorrhagic conversion.  Remote right basal ganglia infarct.  Mild chronic microvascular changes.  After results of MRI available, neurology recommended to discontinue aspirin and start Plavix 75 mg orally daily. Recommend Lipitor 80 mg nightly and continue for 3 months and then may lower  "back to home dose Lipitor 20 mg nightly as LDL is at goal 64.  14-day Zio patch recommended at discharge.    Transthoracic echocardiogram 4/14/21 reported ejection fraction 61-65%.  Left ventricular systolic function normal.  Left ventricular diastolic dysfunction noted.  Normal right ventricular cavity size and systolic function.  No significant valvular dysfunction.  No evidence of right to left shunt on bubble study.  No evidence of intracardiac mass or thrombus.  Results discussed with patient.    Speech therapy consulted and recommended mechanical soft diet.  Physical therapy noted slight left leg weakness.  Otherwise no focal neurological deficits in strength, sensation or coordination.  Patient steady during gait and no further needs for physical therapy.    Hemoglobin A1c 6.4.  TSH 0.763.    Lipid panel LDL 64 at goal.  Cholesterol 111, triglycerides 122.  Neurology recommends 80 mg of Lipitor for 3 months and then may reduce to home dose Lipitor 20 mg orally nightly.    Discussed cessation of CBD products.  Patient denies marijuana use.    On 4/14/2021 she is stable for discharge.  Patient was evaluated by neurology.  MRI positive for acute lacunar infarct left occipital region.  Aspirin discontinued and patient was started on Plavix 75 mg orally daily.  Lipitor increased to 80 mg nightly for 3 months.  Follow-up with neurology 6/30/2021.  Follow-up with Dr. Nolan Hoffman 4/22/2021.    Physical Exam on Discharge:  /46 (BP Location: Left arm, Patient Position: Lying)   Pulse 72   Temp 98 °F (36.7 °C) (Oral)   Resp 16   Ht 152.4 cm (60\")   Wt 89 kg (196 lb 3.2 oz)   SpO2 95%   BMI 38.32 kg/m²   Physical Exam  Vitals and nursing note reviewed.   Constitutional:       Appearance: She is obese.      Comments: Lying in bed.  No oxygen use.   HENT:      Head: Normocephalic and atraumatic.      Nose: No congestion.      Mouth/Throat:      Mouth: Mucous membranes are moist.      Pharynx: No oropharyngeal " exudate.      Comments: Edentulous.  Eyes:      Extraocular Movements: Extraocular movements intact.      Pupils: Pupils are equal, round, and reactive to light.   Cardiovascular:      Rate and Rhythm: Normal rate and regular rhythm.      Heart sounds: No murmur heard.        Comments: Normal sinus rhythm 64-87 on telemetry  Pulmonary:      Breath sounds: No wheezing, rhonchi or rales.      Comments: No oxygen in use.  Abdominal:      Palpations: Abdomen is soft.   Genitourinary:     Comments: Voiding.  Musculoskeletal:         General: No swelling or tenderness.      Cervical back: Normal range of motion and neck supple.   Skin:     General: Skin is warm and dry.   Neurological:      General: No focal deficit present.      Mental Status: She is alert and oriented to person, place, and time. Mental status is at baseline.      Comments: No word finding issues.  No facial asymmetry.  No drooping noted.  Tongue midline.  Speech fluent.  Follows all commands equally.  Moves extremities equally.   Psychiatric:         Mood and Affect: Mood normal.         Behavior: Behavior normal.         Thought Content: Thought content normal.         Judgment: Judgment normal.       Condition on Discharge: Stable    Discharge Disposition: Home    Discharge Diet:   Diet Instructions     Advance Diet As Tolerated        As tolerated    Activity at Discharge:   Activity Instructions     Activity as Tolerated        As tolerated    Discharge Care Plan / Instructions:   1.  For worsening or returning symptoms of slurred speech, facial drooping extremity weakness seek medical attention.  2.  Discontinue aspirin  3.  Plavix 75 mg orally daily  4.  Lipitor 80 mg orally nightly for 3 months then may lower back to home dose Lipitor 20 mg nightly as LDL is at goal at 64.  5.  14-day Zio patch at discharge.  6.  Smoking cessation discussed.  Discussed discontinuation of CBD products.   7.  Follow-up with Dr. Nolan Hoffman/22/21  8.  Follow-up with  neurology 6/30/2021  9.  Discontinue meloxicam    Discharge Medications:     Discharge Medications      New Medications      Instructions Start Date   clopidogrel 75 MG tablet  Commonly known as: PLAVIX   75 mg, Oral, Daily   Start Date: April 15, 2021        Changes to Medications      Instructions Start Date   atorvastatin 80 MG tablet  Commonly known as: LIPITOR  What changed:   · medication strength  · how much to take   80 mg, Oral, Nightly         Continue These Medications      Instructions Start Date   albuterol sulfate  (90 Base) MCG/ACT inhaler  Commonly known as: PROVENTIL HFA;VENTOLIN HFA;PROAIR HFA   2 puffs, Inhalation, Every 4 Hours PRN      atenolol 50 MG tablet  Commonly known as: TENORMIN   50 mg, Oral, Daily      Breo Ellipta 100-25 MCG/INH inhaler  Generic drug: Fluticasone Furoate-Vilanterol   1 puff, Inhalation, Daily - RT      citalopram 10 MG tablet  Commonly known as: CeleXA   10 mg, Oral, Daily      cyclobenzaprine 10 MG tablet  Commonly known as: FLEXERIL   10 mg, Oral, 2 Times Daily PRN      gabapentin 300 MG capsule  Commonly known as: NEURONTIN   300 mg, Oral, 3 Times Daily      nitroglycerin 0.6 MG/HR patch  Commonly known as: NITRODUR   1 patch, Transdermal, Daily      omeprazole 20 MG capsule  Commonly known as: priLOSEC   20 mg, Oral, Daily         Stop These Medications    aspirin 325 MG tablet     meloxicam 7.5 MG tablet  Commonly known as: MOBIC          Follow-up Appointments:   Follow-up Information     [facility] NEUROLOGY Follow up in 3 week(s).    Specialty: Neurology  Why: 3-4 weeks, any provider  Contact information:  [address] [place] [address] [address] [address] [phone]  Additional information:  Phone Number: [phone].  Located at [facility], building [address], across from parking garage.           Bijal Pineda APRN Follow up in 3 week(s).    Specialty: Neurology  Why: 1st available appointment  is June 30, 2021 at 3:15 p.m. if a time opens up the office will call you  Contact information:  [address] [facility]  [address]  [facility] [address]  [phone]             Nolan Hoffman Jr., MD Follow up in 1 week(s).    Specialty: Family Medicine  Why: April 22, 2021 at 9:30 a.m.  Contact information:  [address]  [facility] [address]  [phone]                 Future Appointments:  Future Appointments   Date Time Provider Department Center   6/30/2021  3:15 PM Bijal Pineda APRN [facility]     Test Results Pending at Discharge: None    The above documentation resulted from a face-to-face encounter by me Yumiko CONTRERAS, [facility].    Electronically signed by BEN Duffy, 4/14/2021, 16:04 CDT.    Time: This discharge process required greater than 35 minutes for completion.    Plan discussed with Viri Torres APRN and patient    Time spent in face-to-face evaluation, chart review, planning and education greater than 35 minutes.          Electronically signed by Ang Acosta DO at 04/14/21 1866

## 2021-04-15 NOTE — OUTREACH NOTE
Prep Survey      Responses   [facility] facility patient discharged from?  [place]   Is LACE score < 7 ?  Yes   Emergency Room discharge w/ pulse ox?  No   Eligibility  Readm Mgmt   Discharge diagnosis  Acute lacunar stroke left occipital lobe    Does the patient have one of the following disease processes/diagnoses(primary or secondary)?  Stroke (TIA)   Does the patient have Home health ordered?  No   Is there a DME ordered?  No   Medication alerts for this patient  Plavix    Prep survey completed?  Yes          Angelique Padilla RN

## 2021-04-16 NOTE — THERAPY DISCHARGE NOTE
Acute Care - Speech Language Pathology Discharge Summary  [facility]       Patient Name: Loren Doll  : 1960  MRN: [id number]    Today's Date: 2021                   Admit Date: 2021      SLP Recommendation and Plan  Mechanical soft solids and thin liquids    Visit Dx:    ICD-10-CM ICD-9-CM   1. TIA (transient ischemic attack)  G45.9 435.9   2. Dysphagia, unspecified type  R13.10 787.20   3. Acute lacunar stroke left occipital lobe (CMS/HCC)  I63.81 434.91               SLP GOALS     Row Name 21 0900 21 0920          Oral Nutrition/Hydration Goal 1 (SLP)    Oral Nutrition/Hydration Goal 1, SLP  Patient will tolerate LRD without s/s of aspiration.  -MB  Patient will tolerate LRD without s/s of aspiration.  -MM     Time Frame (Oral Nutrition/Hydration Goal 1, SLP)  by discharge  -MB  by discharge  -MM     Barriers (Oral Nutrition/Hydration Goal 1, SLP)  none  -MB  none  -MM     Progress/Outcomes (Oral Nutrition/Hydration Goal 1, SLP)  goal not met  -MB  goal ongoing  -MM       User Key  (r) = Recorded By, (t) = Taken By, (c) = Cosigned By    Initials Name Provider Type    Sherine Guerra CCC-SLP Speech and Language Pathologist    Stella Rosa, MS CCC-SLP Speech and Language Pathologist                  SLP Discharge Summary  Anticipated Discharge Disposition (SLP): unknown  Reason for Discharge: discharge from this facility  Progress Toward Achieving Short/long Term Goals: goals not met within established timelines  Discharge Destination: home      Sherine Hope CCC-SLP  2021

## 2021-04-20 ENCOUNTER — HOSPITAL ENCOUNTER (OUTPATIENT)
Dept: CARDIOLOGY | Facility: HOSPITAL | Age: 61
Discharge: HOME | End: 2021-04-20
Admitting: CLINICAL NURSE SPECIALIST

## 2021-04-20 ENCOUNTER — READMISSION MANAGEMENT (OUTPATIENT)
Dept: CALL CENTER | Facility: HOSPITAL | Age: 61
End: 2021-04-20

## 2021-04-20 DIAGNOSIS — I63.81 ACUTE LACUNAR STROKE (HCC): ICD-10-CM

## 2021-04-20 NOTE — OUTREACH NOTE
Stroke Week 1 Survey      Responses   [facility] patient discharged from?  [place]   Does the patient have one of the following disease processes/diagnoses(primary or secondary)?  Stroke (TIA)   Week 1 attempt successful?  Yes   Call start time  1514   Call end time  1518   Discharge diagnosis  Acute lacunar stroke left occipital lobe    Meds reviewed with patient/caregiver?  Yes   Is the patient having any side effects they believe may be caused by any medication additions or changes?  No   Does the patient have all medications ordered at discharge?  Yes   Is the patient taking all medications as directed (includes completed medication regime)?  Yes   Does the patient have a primary care provider?   Yes   Does the patient have an appointment with their PCP within 7 days of discharge?  Yes   Comments regarding PCP  patient will see PCP on 04/22/2021   Has the patient kept scheduled appointments due by today?  Yes   Psychosocial issues?  No   Does the patient require any assistance with activities of daily living such as eating, bathing, dressing, walking, etc.?  No   Does the patient have any residual symptoms from stroke/TIA?  No   Does the patient understand the diet ordered at discharge?  No   Did the patient receive a copy of their discharge instructions?  Yes   Nursing interventions  Reviewed instructions with patient   What is the patient's perception of their health status since discharge?  Improving   Is the patient able to teach back FAST for Stroke?  Yes   If the patient is a current smoker, are they able to teach back resources for cessation?  [phone] [Patient reports she is trying to quit.  she has not smoked since going home.  ]   Is the patient/caregiver able to teach back the hierarchy of who to call/visit for symptoms/problems? PCP, Specialist, Home health nurse, Urgent Care, ED, 911  Yes   Week 1 call completed?  Yes   Wrap up additional comments  Patient reports she is doing well.  She  denies needs during this call.           Aisha Piedra RN

## 2021-04-28 ENCOUNTER — READMISSION MANAGEMENT (OUTPATIENT)
Dept: CALL CENTER | Facility: HOSPITAL | Age: 61
End: 2021-04-28

## 2021-04-28 NOTE — OUTREACH NOTE
Stroke Week 2 Survey      Responses   [facility] patient discharged from?  [place]   Does the patient have one of the following disease processes/diagnoses(primary or secondary)?  Stroke (TIA)   Week 2 attempt successful?  No   Unsuccessful attempts  Attempt 1          Zoya White LPN

## 2021-05-17 DIAGNOSIS — I25.10 CAD (CORONARY ARTERY DISEASE): ICD-10-CM

## 2021-05-17 RX ORDER — NITROGLYCERIN 40 MG/1
PATCH TRANSDERMAL
Qty: 90 PATCH | Refills: 3 | Status: SHIPPED | OUTPATIENT
Start: 2021-05-17 | End: 2021-05-24 | Stop reason: SDUPTHER

## 2021-05-17 RX ORDER — ATENOLOL 50 MG/1
TABLET ORAL
Qty: 90 TABLET | Refills: 3 | Status: SHIPPED | OUTPATIENT
Start: 2021-05-17 | End: 2021-05-24 | Stop reason: SDUPTHER

## 2021-05-24 DIAGNOSIS — I25.10 CAD (CORONARY ARTERY DISEASE): ICD-10-CM

## 2021-05-24 RX ORDER — NITROGLYCERIN 40 MG/1
PATCH TRANSDERMAL
Qty: 90 PATCH | Refills: 3 | Status: SHIPPED | OUTPATIENT
Start: 2021-05-24

## 2021-05-24 RX ORDER — ATENOLOL 50 MG/1
TABLET ORAL
Qty: 90 TABLET | Refills: 3 | Status: SHIPPED | OUTPATIENT
Start: 2021-05-24

## 2021-05-27 ENCOUNTER — TELEPHONE (OUTPATIENT)
Dept: CARDIOLOGY CLINIC | Age: 61
End: 2021-05-27

## 2021-07-14 ENCOUNTER — OFFICE VISIT (OUTPATIENT)
Dept: NEUROLOGY | Facility: CLINIC | Age: 61
End: 2021-07-14

## 2021-07-14 ENCOUNTER — HOSPITAL ENCOUNTER (OUTPATIENT)
Dept: GENERAL RADIOLOGY | Facility: HOSPITAL | Age: 61
Discharge: HOME | End: 2021-07-14
Admitting: CLINICAL NURSE SPECIALIST

## 2021-07-14 VITALS
RESPIRATION RATE: 17 BRPM | OXYGEN SATURATION: 98 % | SYSTOLIC BLOOD PRESSURE: 118 MMHG | HEIGHT: 60 IN | WEIGHT: 204 LBS | DIASTOLIC BLOOD PRESSURE: 84 MMHG | BODY MASS INDEX: 40.05 KG/M2 | HEART RATE: 64 BPM

## 2021-07-14 DIAGNOSIS — I63.81 ACUTE LACUNAR STROKE (HCC): Primary | ICD-10-CM

## 2021-07-14 DIAGNOSIS — G47.10 HYPERSOMNOLENCE: ICD-10-CM

## 2021-07-14 DIAGNOSIS — Z86.79 HISTORY OF VENTRICULAR FIBRILLATION: ICD-10-CM

## 2021-07-14 DIAGNOSIS — M25.531 RIGHT WRIST PAIN: ICD-10-CM

## 2021-07-14 DIAGNOSIS — R06.83 SNORING: ICD-10-CM

## 2021-07-14 DIAGNOSIS — Z86.73 HISTORY OF STROKE: ICD-10-CM

## 2021-07-14 DIAGNOSIS — I25.2 HISTORY OF MI (MYOCARDIAL INFARCTION): ICD-10-CM

## 2021-07-14 PROCEDURE — 73110 X-RAY EXAM OF WRIST: CPT

## 2021-07-14 PROCEDURE — 99214 OFFICE O/P EST MOD 30 MIN: CPT | Performed by: CLINICAL NURSE SPECIALIST

## 2021-07-14 NOTE — PROGRESS NOTES
Subjective     Chief Complaint   Patient presents with   • Stroke     F/U doing well       Loren Doll is a 61 y.o. female right handed. Patient is here today for hospital follow up for left occipital stroke 4/2021 and history of prior stroke. PMH includes MI, ventricular fibrillation, DM, HTN, HLD, tobacco use since age 14. I am pleased to report that patient has stopped tobacco use since her stroke. Patient has no residual effects of stroke. She is taking Plavix 75 mg daily and denies bleeding. She continues with Lipitor 80 mg and denies myalgias. She does complain of right wrist pain with manipulation and denies falls or injury. She also reports gradual decline in her vision of blurred vision and plans to schedule eye appointment. Overall, She is doing well. She does tell me that PCP had checked her A1C and had crept up to 7.1.   Patient does snore and she tells me her  has not observed apnea but has told her that she breaths very shallow during sleep. She has some day time somnolence with Epwotht= 5 and STOP-BANG = high.   Patient sees [facility] cardiology team for the above and is requesting referral to [facility] cardiology.   Patient did have 14 day ZIO after discharge that showed 1 run of nonsustained SVT. Per that report, patient reported no symptoms.       Stroke  This is a chronic (right basal ganglia and left occipital stroke) problem. Episode onset: left occipital stroke 4/2021. Associated symptoms include arthralgias (right wrist). Pertinent negatives include no fatigue, headaches, nausea, numbness or vomiting. Associated symptoms comments: Right facial weakness and dysarthria. Exacerbated by: HTN, HLD, tobacco use, DM. The treatment provided significant relief.        Current Outpatient Medications   Medication Sig Dispense Refill   • albuterol sulfate  (90 Base) MCG/ACT inhaler Inhale 2 puffs Every 4 (Four) Hours As Needed for Wheezing or Shortness of Air.     • atenolol (TENORMIN) 50 MG  tablet Take 50 mg by mouth Daily.     • atorvastatin (LIPITOR) 80 MG tablet Take 1 tablet by mouth Every Night. 30 tablet 1   • citalopram (CeleXA) 10 MG tablet Take 10 mg by mouth Daily.     • clopidogrel (PLAVIX) 75 MG tablet Take 1 tablet by mouth Daily. 30 tablet 1   • cyclobenzaprine (FLEXERIL) 10 MG tablet Take 10 mg by mouth 2 (Two) Times a Day As Needed for Muscle Spasms.     • Fluticasone Furoate-Vilanterol (Breo Ellipta) 100-25 MCG/INH inhaler Inhale 1 puff Daily.     • gabapentin (NEURONTIN) 300 MG capsule Take 300 mg by mouth 3 (Three) Times a Day.     • nitroglycerin (NITRODUR) 0.6 MG/HR patch Place 1 patch on the skin as directed by provider Daily.     • omeprazole (priLOSEC) 20 MG capsule Take 20 mg by mouth Daily.       No current facility-administered medications for this visit.       Past Medical History:   Diagnosis Date   • Coronary artery disease    • Hyperlipidemia    • Hypertension    • Myocardial infarction (CMS/[facility])    • Peripheral vascular disease (CMS/[facility])    • Stroke (CMS/[facility])    • Tobacco dependence    • VF (ventricular fibrillation) (CMS/[facility])        Past Surgical History:   Procedure Laterality Date   •  SECTION     • CHOLECYSTECTOMY         family history includes COPD in her mother; Heart disease in her father.    Social History     Tobacco Use   • Smoking status: Current Every Day Smoker     Packs/day: 1.50     Types: Cigarettes   Substance Use Topics   • Alcohol use: Not Currently   • Drug use: Yes       Review of Systems   Constitutional: Negative.  Negative for fatigue.   HENT: Negative.  Negative for trouble swallowing.    Eyes: Positive for visual disturbance (blurred vision).   Respiratory: Negative.  Negative for shortness of breath.    Cardiovascular: Negative.  Negative for leg swelling.   Gastrointestinal: Negative.  Negative for blood in stool, constipation, diarrhea, nausea and vomiting.   Endocrine: Negative.    Genitourinary: Negative.  Negative for dysuria.  "  Musculoskeletal: Positive for arthralgias (right wrist).   Skin: Negative.    Allergic/Immunologic: Negative.    Neurological: Negative.  Negative for speech difficulty, numbness and headaches.   Hematological: Negative.    Psychiatric/Behavioral: Negative.  Negative for agitation and confusion.   All other systems reviewed and are negative.      Objective     /84 (BP Location: Left arm, Patient Position: Sitting, Cuff Size: Adult)   Pulse 64   Resp 17   Ht 152.4 cm (60\")   Wt 92.5 kg (204 lb)   SpO2 98%   Breastfeeding No   BMI 39.84 kg/m² , Body mass index is 39.84 kg/m².    Physical Exam  Vitals and nursing note reviewed.   Constitutional:       Appearance: Normal appearance.   HENT:      Head: Normocephalic and atraumatic.      Right Ear: External ear normal.      Left Ear: External ear normal.   Eyes:      General: Lids are normal. No visual field deficit.     Extraocular Movements:      Right eye: Normal extraocular motion and no nystagmus.      Left eye: Normal extraocular motion and no nystagmus.      Pupils: Pupils are equal, round, and reactive to light.   Neck:      Vascular: No carotid bruit.   Cardiovascular:      Rate and Rhythm: Normal rate and regular rhythm.      Pulses: No decreased pulses.      Heart sounds: Normal heart sounds. No murmur heard.     Pulmonary:      Effort: Pulmonary effort is normal.      Breath sounds: Normal breath sounds. No decreased breath sounds or rhonchi.   Abdominal:      General: Bowel sounds are normal.      Palpations: Abdomen is soft.   Musculoskeletal:         General: Normal range of motion.      Cervical back: Full passive range of motion without pain and neck supple.      Comments: Right wrist pain with palpation. Some mild edema   Skin:     General: Skin is warm and dry.   Neurological:      Mental Status: She is alert and oriented to person, place, and time.      GCS: GCS eye subscore is 4. GCS verbal subscore is 5. GCS motor subscore is 6.      " Cranial Nerves: No cranial nerve deficit, dysarthria or facial asymmetry.      Motor: No weakness, tremor, atrophy, abnormal muscle tone or pronator drift.      Coordination: Coordination normal.      Gait: Gait normal.      Deep Tendon Reflexes: Reflexes are normal and symmetric.      Reflex Scores:       Tricep reflexes are 2+ on the right side and 2+ on the left side.       Bicep reflexes are 2+ on the right side and 2+ on the left side.       Brachioradialis reflexes are 2+ on the right side and 2+ on the left side.       Patellar reflexes are 2+ on the right side and 2+ on the left side.       Achilles reflexes are 2+ on the right side and 2+ on the left side.     Comments: General Exam:  Head:  Normocephalic, atraumatic  HEENT:  Neck supple  Fundoscopic Exam:  No signs of disc edema  CVS:  Regular rate and rhythm.  No murmurs  Carotid Examination:  No bruits  Lungs:  Clear to auscultation  Abdomen:  Nontender, nondistended  Extremities:  No signs of peripheral edema  Skin:  No rashes    Neurologic Exam:    Mental Status:    -Alert, Oriented X 3  -No word-finding difficulties  -No aphasia  -No dysarthria  -Follows simple and complex commands    CN II:  Visual fields full.  Pupils equally reactive to light  CN III, IV, VI:  Extraocular Muscles full with no signs of nystagmus  CN V:  Facial sensory is symmetric with no asymmetries.  CN VII:  Facial motor symmetric  CN VIII:  Gross hearing intact bilaterally  CN IX:  Palate elevates symmetrically  CN X:  Palate elevates symmetrically  CN XI:  Shoulder shrug symmetric  CN XII:  Tongue protrudes to midline  Motor: (strength out of 5:  1= minimal movement, 2 = movement in plane of gravity, 3 = movement against gravity, 4 = movement against some resistance, 5 = full strength)    -Right Upper Ext: Proximal: 5 Distal: 5  -Left Upper Ext: Proximal: 5 Distal: 5    -Right Lower Ext: Proximal: 5 Distal: 5  -Left Lower Ext: Proximal: 5 Distal: 5    DTR:  -Right   Bicep:  2+ Tricep: 2+ Brachoradialis: 2+   Patella: 2+ Ankle: 2+ Neg Babinski  -Left   Bicep: 2+ Tricep: 2+ Brachoradialis: 2+   Patella: 2+ Ankle: 2+ Neg Babinski    Sensory:  -Intact to light touch, pinprick, temperature, pain, and proprioception    Coordination:  -Finger to nose intact  -Heel to shin intact      Gait  -No signs of ataxia  -ambulates unassisted     Psychiatric:         Speech: Speech normal.         Behavior: Behavior normal. Behavior is cooperative.         Results for orders placed or performed during the hospital encounter of 04/13/21   COVID-19, [facility] IN-HOUSE,NASAL Swab (NO TRANSPORT MEDIA) 2 HR TAT - Swab, Nasopharynx    Specimen: Nasopharynx; Swab   Result Value Ref Range    COVID19 Presumptive Negative Presumptive Negative - Ref. Range   Comprehensive Metabolic Panel    Specimen: Blood   Result Value Ref Range    Glucose 102 (H) 65 - 99 mg/dL    BUN 17 8 - 23 mg/dL    Creatinine 0.68 0.57 - 1.00 mg/dL    Sodium 144 136 - 145 mmol/L    Potassium 4.5 3.5 - 5.2 mmol/L    Chloride 105 98 - 107 mmol/L    CO2 30.0 (H) 22.0 - 29.0 mmol/L    Calcium 8.9 8.6 - 10.5 mg/dL    Total Protein 6.7 6.0 - 8.5 g/dL    Albumin 4.00 3.50 - 5.20 g/dL    ALT (SGPT) 22 1 - 33 U/L    AST (SGOT) 16 1 - 32 U/L    Alkaline Phosphatase 176 (H) 39 - 117 U/L    Total Bilirubin 0.2 0.0 - 1.2 mg/dL    eGFR Non African Amer 88 >60 mL/min/1.73    eGFR  African Amer 106 >60 mL/min/1.73    Globulin 2.7 gm/dL    A/G Ratio 1.5 g/dL    BUN/Creatinine Ratio 25.0 7.0 - 25.0    Anion Gap 9.0 5.0 - 15.0 mmol/L   Protime-INR    Specimen: Blood   Result Value Ref Range    Protime 12.9 11.9 - 14.6 Seconds    INR 1.01 0.91 - 1.09   CBC Auto Differential    Specimen: Blood   Result Value Ref Range    WBC 6.95 3.40 - 10.80 10*3/mm3    RBC 5.01 3.77 - 5.28 10*6/mm3    Hemoglobin 13.2 12.0 - 15.9 g/dL    Hematocrit 41.7 34.0 - 46.6 %    MCV 83.2 79.0 - 97.0 fL    MCH 26.3 (L) 26.6 - 33.0 pg    MCHC 31.7 31.5 - 35.7 g/dL    RDW 15.2 12.3 - 15.4 %     RDW-SD 45.6 37.0 - 54.0 fl    MPV 9.3 6.0 - 12.0 fL    Platelets 280 140 - 450 10*3/mm3    Neutrophil % 59.7 42.7 - 76.0 %    Lymphocyte % 32.7 19.6 - 45.3 %    Monocyte % 6.6 5.0 - 12.0 %    Eosinophil % 0.1 (L) 0.3 - 6.2 %    Basophil % 0.6 0.0 - 1.5 %    Immature Grans % 0.3 0.0 - 0.5 %    Neutrophils, Absolute 4.15 1.70 - 7.00 10*3/mm3    Lymphocytes, Absolute 2.27 0.70 - 3.10 10*3/mm3    Monocytes, Absolute 0.46 0.10 - 0.90 10*3/mm3    Eosinophils, Absolute 0.01 0.00 - 0.40 10*3/mm3    Basophils, Absolute 0.04 0.00 - 0.20 10*3/mm3    Immature Grans, Absolute 0.02 0.00 - 0.05 10*3/mm3    nRBC 0.0 0.0 - 0.2 /100 WBC   Urine Drug Screen - Urine, Clean Catch    Specimen: Urine, Clean Catch   Result Value Ref Range    THC, Screen, Urine Positive (A) Negative    Phencyclidine (PCP), Urine Negative Negative    Cocaine Screen, Urine Negative Negative    Methamphetamine, Ur Negative Negative    Opiate Screen Negative Negative    Amphetamine Screen, Urine Negative Negative    Benzodiazepine Screen, Urine Negative Negative    Tricyclic Antidepressants Screen Positive (A) Negative    Methadone Screen, Urine Negative Negative    Barbiturates Screen, Urine Negative Negative    Oxycodone Screen, Urine Negative Negative    Propoxyphene Screen Negative Negative    Buprenorphine, Screen, Urine Negative Negative   Urinalysis With Culture If Indicated - Urine, Clean Catch    Specimen: Urine, Clean Catch   Result Value Ref Range    Color, UA Yellow Yellow, Straw    Appearance, UA Clear Clear    pH, UA 6.0 5.0 - 8.0    Specific Gravity, UA >1.030 (H) 1.005 - 1.030    Glucose, UA Negative Negative    Ketones, UA Negative Negative    Bilirubin, UA Negative Negative    Blood, UA Negative Negative    Protein, UA Negative Negative    Leuk Esterase, UA Negative Negative    Nitrite, UA Negative Negative    Urobilinogen, UA 1.0 E.U./dL 0.2 - 1.0 E.U./dL   TSH    Specimen: Blood   Result Value Ref Range    TSH 0.813 0.270 - 4.200 uIU/mL    Ethanol    Specimen: Blood   Result Value Ref Range    Ethanol % <0.010 %   Hemoglobin A1c    Specimen: Blood   Result Value Ref Range    Hemoglobin A1C 6.40 (H) 4.80 - 5.60 %   Lipid Panel    Specimen: Blood   Result Value Ref Range    Total Cholesterol 111 0 - 200 mg/dL    Triglycerides 122 0 - 150 mg/dL    HDL Cholesterol 25 (L) 40 - 60 mg/dL    LDL Cholesterol  64 0 - 100 mg/dL    VLDL Cholesterol 22 5 - 40 mg/dL    LDL/HDL Ratio 2.46    Comprehensive Metabolic Panel    Specimen: Blood   Result Value Ref Range    Glucose 91 65 - 99 mg/dL    BUN 14 8 - 23 mg/dL    Creatinine 0.56 (L) 0.57 - 1.00 mg/dL    Sodium 145 136 - 145 mmol/L    Potassium 4.1 3.5 - 5.2 mmol/L    Chloride 104 98 - 107 mmol/L    CO2 31.0 (H) 22.0 - 29.0 mmol/L    Calcium 8.8 8.6 - 10.5 mg/dL    Total Protein 6.6 6.0 - 8.5 g/dL    Albumin 3.80 3.50 - 5.20 g/dL    ALT (SGPT) 19 1 - 33 U/L    AST (SGOT) 17 1 - 32 U/L    Alkaline Phosphatase 160 (H) 39 - 117 U/L    Total Bilirubin 0.3 0.0 - 1.2 mg/dL    eGFR Non African Amer 110 >60 mL/min/1.73    Globulin 2.8 gm/dL    A/G Ratio 1.4 g/dL    BUN/Creatinine Ratio 25.0 7.0 - 25.0    Anion Gap 10.0 5.0 - 15.0 mmol/L   CBC (No Diff)    Specimen: Blood   Result Value Ref Range    WBC 5.57 3.40 - 10.80 10*3/mm3    RBC 5.07 3.77 - 5.28 10*6/mm3    Hemoglobin 13.4 12.0 - 15.9 g/dL    Hematocrit 42.2 34.0 - 46.6 %    MCV 83.2 79.0 - 97.0 fL    MCH 26.4 (L) 26.6 - 33.0 pg    MCHC 31.8 31.5 - 35.7 g/dL    RDW 15.3 12.3 - 15.4 %    RDW-SD 45.6 37.0 - 54.0 fl    MPV 9.2 6.0 - 12.0 fL    Platelets 258 140 - 450 10*3/mm3   TSH    Specimen: Blood   Result Value Ref Range    TSH 0.763 0.270 - 4.200 uIU/mL   Vitamin B12    Specimen: Blood   Result Value Ref Range    Vitamin B-12 372 211 - 946 pg/mL   POC Glucose Once    Specimen: Blood   Result Value Ref Range    Glucose 95 70 - 130 mg/dL   ECG 12 Lead   Result Value Ref Range    QT Interval 428 ms    QTC Interval 475 ms   Adult Transthoracic Echo Complete W/  Cont if Necessary Per Protocol (With Agitated Saline)   Result Value Ref Range    BSA 1.9 m^2    IVSd 1.3 cm    LVIDd 3.0 cm    LVIDs 2.5 cm    LVPWd 0.9 cm    IVS/LVPW 1.4     FS 18.2 %    EDV(Teich) 35.9 ml    ESV(Teich) 21.9 ml    EF(Teich) 39.0 %    EDV(cubed) 27.8 ml    ESV(cubed) 15.3 ml    EF(cubed) 45.2 %    LV mass(C)d 96.8 grams    LV mass(C)dI 52.3 grams/m^2    SV(Teich) 14.0 ml    SI(Teich) 7.6 ml/m^2    SV(cubed) 12.6 ml    SI(cubed) 6.8 ml/m^2    Ao root diam 2.5 cm    Ao root area 4.9 cm^2    LA dimension 2.8 cm    LA/Ao 1.1     LVOT diam 2.0 cm    LVOT area 3.1 cm^2    LVOT area(traced) 3.1 cm^2    LVLd ap4 7.4 cm    EDV(MOD-sp4) 80.2 ml    LVLs ap4 5.9 cm    ESV(MOD-sp4) 19.7 ml    EF(MOD-sp4) 75.4 %    SV(MOD-sp4) 60.5 ml    SI(MOD-sp4) 32.7 ml/m^2    Ao root area (BSA corrected) 1.4     LV Alvarado Vol (BSA corrected) 43.3 ml/m^2    LV Sys Vol (BSA corrected) 10.6 ml/m^2    MV E max rip 117.0 cm/sec    MV A max rip 144.0 cm/sec    MV E/A 0.81     MV dec time 0.44 sec    Ao pk rip 153.0 cm/sec    Ao max PG 9.4 mmHg    Ao max PG (full) 7.2 mmHg    Ao V2 mean 102.0 cm/sec    Ao mean PG 5.0 mmHg    Ao mean PG (full) 4.0 mmHg    Ao V2 VTI 37.3 cm    ALEXYS(I,A) 1.4 cm^2    ALEXYS(I,D) 1.4 cm^2    ALEXYS(V,A) 1.5 cm^2    ALEXYS(V,D) 1.5 cm^2    LV V1 max PG 2.1 mmHg    LV V1 mean PG 1.0 mmHg    LV V1 max 73.2 cm/sec    LV V1 mean 46.5 cm/sec    LV V1 VTI 17.0 cm    MR max irp 487.3 cm/sec    MR max PG 95.7 mmHg    MR mean rip 402.0 cm/sec    MR mean PG 73.0 mmHg    MR .0 cm    SV(Ao) 183.1 ml    SI(Ao) 98.9 ml/m^2    SV(LVOT) 53.4 ml    SI(LVOT) 28.9 ml/m^2     CV ECHO SHAVONNE - BZI_BMI 38.3 kilograms/m^2     CV ECHO SHAVONNE - BSA(HAYCOCK) 2.0 m^2     CV ECHO SHAVONNE - BZI_METRIC_WEIGHT 88.9 kg     CV ECHO SHAVONNE - BZI_METRIC_HEIGHT 152.4 cm    Target HR (85%) 134 bpm    Max. Pred. HR (100%) 158 bpm    LA volume 43.2 cm3    LA Volume Index 23.4 mL/m2    Avg E/e' ratio 20.54     Lat Peak E' Rip 5.3 cm/sec    Med  Peak E' Rip 6.09 cm/sec   Type & Screen    Specimen: Blood   Result Value Ref Range    ABO Type B     RH type Positive     Antibody Screen Negative     T&S Expiration Date 4/16/2021 11:59:59 PM    Light Blue Top   Result Value Ref Range    Extra Tube hold for add-on    Green Top (Gel)   Result Value Ref Range    Extra Tube Hold for add-ons.    Lavender Top   Result Value Ref Range    Extra Tube hold for add-on    Red Top   Result Value Ref Range    Extra Tube Hold for add-ons.         MRI brain 4/2021 personally reviewed by me showing acute stroke left occipital stroke and prior right basal ganglia stroke.     Results for orders placed during the hospital encounter of 04/13/21    Adult Transthoracic Echo Complete W/ Cont if Necessary Per Protocol (With Agitated Saline)    Interpretation Summary  · Left ventricular ejection fraction appears to be 61 - 65%. Left ventricular systolic function is normal.  · Left ventricular diastolic dysfunction is noted.  · Normal right ventricular cavity size and systolic function noted.  · There is no significant (greater than mild) valvular dysfunction.  · There is no evidence of right-to-left shunt on bubble study.  · There is no evidence of intracardiac mass or thrombus.    ASSESSMENT/PLAN    Diagnoses and all orders for this visit:    Acute lacunar stroke left occipital lobe (CMS/HCC)  -     Holter Monitor - 72 Hour Up To 15 Days; Future    Hypersomnolence  -     Polysomnography 4 or More Parameters; Future    History of MI (myocardial infarction)  -     Ambulatory Referral to Cardiology    History of ventricular fibrillation  -     Ambulatory Referral to Cardiology    Snoring  -     Polysomnography 4 or More Parameters; Future    History of stroke, right basal ganglia  -     Holter Monitor - 72 Hour Up To 15 Days; Future    Right wrist pain  -     Cancel: XR wrist 2 vw right; Future          ICD-10-CM ICD-9-CM   1. Acute lacunar stroke left occipital lobe (CMS/HCC)  I63.81  434.91   2. Hypersomnolence  G47.10 780.54   3. History of MI (myocardial infarction)  I25.2 412   4. History of ventricular fibrillation  Z86.79 V12.59   5. Snoring  R06.83 786.09   6. History of stroke, right basal ganglia  Z86.73 V12.54   7. Right wrist pain  M25.531 719.43        PLAN:   1. Continue Plavix 75 mg daily for secondary stroke prevention.  2. Continue Lipitor 80 mg daily for LDL goal less than 70. Even if LDL is low end normal, will recommend continue statin.  3. Patient applauded for tobacco cessation.  4. Will refer to cardiology per patient request as she would like to transition her care to [facility].  5. Will arrange to repeat 14 day Holter monitor looking for cardiac source of stroke.  6. Xray right wrist. I suspect she has underlying arthritis.  7. Patient is petite with a short and thickened neck which may predispose patient to develop YASMINE. Patient does snore and although denies observed apnea during sleep,  reports she does breath shallow. STOP BANG= high and [place]=5.  8. Patient to f/u in 2 months with ALINE CONTRERAS.  9. Patient is counseled on stroke signs and symptoms using FAST and Time Saved is Brain Saved.  10. Patient labs did show low normal B12 372. She may benefit from OTC B12 250 mcg and I discussed with patient.      allergies and all known medications/prescriptions have been reviewed using resources available on this encounter.    Return in about 2 months (around 9/14/2021).        BEN Delong

## 2021-07-14 NOTE — PATIENT INSTRUCTIONS
Stroke Prevention  Some medical conditions and behaviors are associated with a higher chance of having a stroke. You can help prevent a stroke by making nutrition, lifestyle, and other changes, including managing any medical conditions you may have.  What nutrition changes can be made?    · Eat healthy foods. You can do this by:  ? Choosing foods high in fiber, such as fresh fruits and vegetables and whole grains.  ? Eating at least 5 or more servings of fruits and vegetables a day. Try to fill half of your plate at each meal with fruits and vegetables.  ? Choosing lean protein foods, such as lean cuts of meat, poultry without skin, fish, tofu, beans, and nuts.  ? Eating low-fat dairy products.  ? Avoiding foods that are high in salt (sodium). This can help lower blood pressure.  ? Avoiding foods that have saturated fat, trans fat, and cholesterol. This can help prevent high cholesterol.  ? Avoiding processed and premade foods.  · Follow your health care provider's specific guidelines for losing weight, controlling high blood pressure (hypertension), lowering high cholesterol, and managing diabetes. These may include:  ? Reducing your daily calorie intake.  ? Limiting your daily sodium intake to 1,500 milligrams (mg).  ? Using only healthy fats for cooking, such as olive oil, canola oil, or sunflower oil.  ? Counting your daily carbohydrate intake.  What lifestyle changes can be made?  · Maintain a healthy weight. Talk to your health care provider about your ideal weight.  · Get at least 30 minutes of moderate physical activity at least 5 days a week. Moderate activity includes brisk walking, biking, and swimming.  · Do not use any products that contain nicotine or tobacco, such as cigarettes and e-cigarettes. If you need help quitting, ask your health care provider. It may also be helpful to avoid exposure to secondhand smoke.  · Limit alcohol intake to no more than 1 drink a day for nonpregnant women and 2 drinks  a day for men. One drink equals 12 oz of beer, 5 oz of wine, or 1½ oz of hard liquor.  · Stop any illegal drug use.  · Avoid taking birth control pills. Talk to your health care provider about the risks of taking birth control pills if:  ? You are over 35 years old.  ? You smoke.  ? You get migraines.  ? You have ever had a blood clot.  What other changes can be made?  · Manage your cholesterol levels.  ? Eating a healthy diet is important for preventing high cholesterol. If cholesterol cannot be managed through diet alone, you may also need to take medicines.  ? Take any prescribed medicines to control your cholesterol as told by your health care provider.  · Manage your diabetes.  ? Eating a healthy diet and exercising regularly are important parts of managing your blood sugar. If your blood sugar cannot be managed through diet and exercise, you may need to take medicines.  ? Take any prescribed medicines to control your diabetes as told by your health care provider.  · Control your hypertension.  ? To reduce your risk of stroke, try to keep your blood pressure below 130/80.  ? Eating a healthy diet and exercising regularly are an important part of controlling your blood pressure. If your blood pressure cannot be managed through diet and exercise, you may need to take medicines.  ? Take any prescribed medicines to control hypertension as told by your health care provider.  ? Ask your health care provider if you should monitor your blood pressure at home.  ? Have your blood pressure checked every year, even if your blood pressure is normal. Blood pressure increases with age and some medical conditions.  · Get evaluated for sleep disorders (sleep apnea). Talk to your health care provider about getting a sleep evaluation if you snore a lot or have excessive sleepiness.  · Take over-the-counter and prescription medicines only as told by your health care provider. Aspirin or blood thinners (antiplatelets or  anticoagulants) may be recommended to reduce your risk of forming blood clots that can lead to stroke.  · Make sure that any other medical conditions you have, such as atrial fibrillation or atherosclerosis, are managed.  What are the warning signs of a stroke?  The warning signs of a stroke can be easily remembered as BEFAST.  · B is for balance. Signs include:  ? Dizziness.  ? Loss of balance or coordination.  ? Sudden trouble walking.  · E is for eyes. Signs include:  ? A sudden change in vision.  ? Trouble seeing.  · F is for face. Signs include:  ? Sudden weakness or numbness of the face.  ? The face or eyelid drooping to one side.  · A is for arms. Signs include:  ? Sudden weakness or numbness of the arm, usually on one side of the body.  · S is for speech. Signs include:  ? Trouble speaking (aphasia).  ? Trouble understanding.  · T is for time.  ? These symptoms may represent a serious problem that is an emergency. Do not wait to see if the symptoms will go away. Get medical help right away. Call your local emergency services (911 in the [place] Do not drive yourself to the hospital.  · Other signs of stroke may include:  ? A sudden, severe headache with no known cause.  ? Nausea or vomiting.  ? Seizure.  Where to find more information  For more information, visit:  · American Stroke Association: www.strokeassociation.org  · National Stroke Association: www.stroke.org  Summary  · You can prevent a stroke by eating healthy, exercising, not smoking, limiting alcohol intake, and managing any medical conditions you may have.  · Do not use any products that contain nicotine or tobacco, such as cigarettes and e-cigarettes. If you need help quitting, ask your health care provider. It may also be helpful to avoid exposure to secondhand smoke.  · Remember BEFAST for warning signs of stroke. Get help right away if you or a loved one has any of these signs.  This information is not intended to replace advice given to you  "by your health care provider. Make sure you discuss any questions you have with your health care provider.  Document Revised: 11/30/2018 Document Reviewed: 01/23/2018  Elsevier Patient Education © 2021 [employer]  BMI for Adults  What is BMI?  Body mass index (BMI) is a number that is calculated from a person's weight and height. BMI can help estimate how much of a person's weight is composed of fat. BMI does not measure body fat directly. Rather, it is an alternative to procedures that directly measure body fat, which can be difficult and expensive.  BMI can help identify people who may be at higher risk for certain medical problems.  What are BMI measurements used for?  BMI is used as a screening tool to identify possible weight problems. It helps determine whether a person is obese, overweight, a healthy weight, or underweight.  BMI is useful for:  · Identifying a weight problem that may be related to a medical condition or may increase the risk for medical problems.  · Promoting changes, such as changes in diet and exercise, to help reach a healthy weight. BMI screening can be repeated to see if these changes are working.  How is BMI calculated?  BMI involves measuring your weight in relation to your height. Both height and weight are measured, and the BMI is calculated from those numbers. This can be done either in English ([place] or metric measurements. Note that charts and online BMI calculators are available to help you find your BMI quickly and easily without having to do these calculations yourself.  To calculate your BMI in English ([place] measurements:    1. Measure your weight in pounds (lb).  2. Multiply the number of pounds by 703.  ? For example, for a person who weighs 180 lb, multiply that number by 703, which equals 126,540.  3. Measure your height in inches. Then multiply that number by itself to get a measurement called \"inches squared.\"  ? For example, for a person who is 70 inches tall, the " "\"inches squared\" measurement is 70 inches x 70 inches, which equals 4,900 inches squared.  4. Divide the total from step 2 (number of lb x 703) by the total from step 3 (inches squared): 126,540 ÷ 4,900 = 25.8. This is your BMI.  To calculate your BMI in metric measurements:  1. Measure your weight in kilograms (kg).  2. Measure your height in meters (m). Then multiply that number by itself to get a measurement called \"meters squared.\"  ? For example, for a person who is 1.75 m tall, the \"meters squared\" measurement is 1.75 m x 1.75 m, which is equal to 3.1 meters squared.  3. Divide the number of kilograms (your weight) by the meters squared number. In this example: 70 ÷ 3.1 = 22.6. This is your BMI.  What do the results mean?  BMI charts are used to identify whether you are underweight, normal weight, overweight, or obese. The following guidelines will be used:  · Underweight: BMI less than 18.5.  · Normal weight: BMI between 18.5 and 24.9.  · Overweight: BMI between 25 and 29.9.  · Obese: BMI of 30 or above.  Keep these notes in mind:  · Weight includes both fat and muscle, so someone with a muscular build, such as an athlete, may have a BMI that is higher than 24.9. In cases like these, BMI is not an accurate measure of body fat.  · To determine if excess body fat is the cause of a BMI of 25 or higher, further assessments may need to be done by a health care provider.  · BMI is usually interpreted in the same way for men and women.  Where to find more information  For more information about BMI, including tools to quickly calculate your BMI, go to these websites:  · Centers for Disease Control and Prevention: www.cdc.gov  · American Heart Association: www.heart.org  · National Heart, Lung, and [facility] [place]: www.nhlbi.nih.gov  Summary  · Body mass index (BMI) is a number that is calculated from a person's weight and height.  · BMI may help estimate how much of a person's weight is composed of fat. BMI can help " identify those who may be at higher risk for certain medical problems.  · BMI can be measured using English measurements or metric measurements.  · BMI charts are used to identify whether you are underweight, normal weight, overweight, or obese.  This information is not intended to replace advice given to you by your health care provider. Make sure you discuss any questions you have with your health care provider.  Document Revised: 09/09/2020 Document Reviewed: 07/17/2020  Elsevier Patient Education © 2021 [employer]

## 2021-07-19 DIAGNOSIS — R06.83 SNORING: ICD-10-CM

## 2021-07-19 DIAGNOSIS — G47.10 HYPERSOMNOLENCE: Primary | ICD-10-CM

## 2021-07-19 DIAGNOSIS — G47.30 SLEEP APNEA, UNSPECIFIED TYPE: ICD-10-CM

## 2021-09-22 ENCOUNTER — HOSPITAL ENCOUNTER (OUTPATIENT)
Dept: SLEEP MEDICINE | Facility: HOSPITAL | Age: 61
End: 2021-09-22

## 2021-10-19 ENCOUNTER — TELEPHONE (OUTPATIENT)
Dept: NEUROLOGY | Facility: CLINIC | Age: 61
End: 2021-10-19

## 2021-10-19 NOTE — TELEPHONE ENCOUNTER
Dominga with [employer] called stating Viri Pineda would need to put in an order for covid testing prior to her sleep study.

## 2021-10-20 DIAGNOSIS — G47.33 OBSTRUCTIVE SLEEP APNEA: Primary | ICD-10-CM

## 2021-10-27 ENCOUNTER — HOSPITAL ENCOUNTER (OUTPATIENT)
Dept: SLEEP MEDICINE | Facility: HOSPITAL | Age: 61
End: 2021-10-27

## 2021-10-29 ENCOUNTER — OFFICE VISIT (OUTPATIENT)
Dept: CARDIOLOGY | Facility: CLINIC | Age: 61
End: 2021-10-29

## 2021-10-29 VITALS
HEIGHT: 60 IN | WEIGHT: 209 LBS | DIASTOLIC BLOOD PRESSURE: 90 MMHG | SYSTOLIC BLOOD PRESSURE: 160 MMHG | HEART RATE: 67 BPM | OXYGEN SATURATION: 98 % | BODY MASS INDEX: 41.03 KG/M2

## 2021-10-29 DIAGNOSIS — Z72.0 TOBACCO USE: ICD-10-CM

## 2021-10-29 DIAGNOSIS — I25.10 CORONARY ARTERY DISEASE INVOLVING NATIVE CORONARY ARTERY OF NATIVE HEART WITHOUT ANGINA PECTORIS: Primary | ICD-10-CM

## 2021-10-29 DIAGNOSIS — E11.649 TYPE 2 DIABETES MELLITUS WITH HYPOGLYCEMIA WITHOUT COMA, WITHOUT LONG-TERM CURRENT USE OF INSULIN (HCC): ICD-10-CM

## 2021-10-29 DIAGNOSIS — E78.2 MIXED HYPERLIPIDEMIA: ICD-10-CM

## 2021-10-29 DIAGNOSIS — I63.9 CEREBROVASCULAR ACCIDENT (CVA), UNSPECIFIED MECHANISM (HCC): ICD-10-CM

## 2021-10-29 DIAGNOSIS — I10 ESSENTIAL HYPERTENSION: ICD-10-CM

## 2021-10-29 PROCEDURE — 99204 OFFICE O/P NEW MOD 45 MIN: CPT | Performed by: INTERNAL MEDICINE

## 2021-10-29 PROCEDURE — 93000 ELECTROCARDIOGRAM COMPLETE: CPT | Performed by: INTERNAL MEDICINE

## 2021-10-29 RX ORDER — FUROSEMIDE 20 MG/1
TABLET ORAL
COMMUNITY
Start: 2021-10-21

## 2021-10-29 RX ORDER — FLUTICASONE PROPIONATE 50 MCG
2 SPRAY, SUSPENSION (ML) NASAL DAILY
COMMUNITY
Start: 2021-08-26

## 2021-10-29 RX ORDER — TRIAMCINOLONE ACETONIDE 1 MG/G
1 CREAM TOPICAL 2 TIMES DAILY
COMMUNITY

## 2021-10-29 RX ORDER — NITROGLYCERIN 40 MG/1
PATCH TRANSDERMAL
COMMUNITY
Start: 2021-08-19 | End: 2022-05-19 | Stop reason: SDUPTHER

## 2021-10-29 RX ORDER — METFORMIN HYDROCHLORIDE 750 MG/1
TABLET, EXTENDED RELEASE ORAL
COMMUNITY
Start: 2021-09-11

## 2021-10-29 NOTE — PROGRESS NOTES
Referring Provider: Nolan Hoffman Jr., MD    Reason for Consultation: CAD    Chief complaint:   Chief Complaint   Patient presents with   • New Patient     referred by BEN Torres for evaluation of ventricular fibrillation and history of MI   • Atrial Fibrillation     pt wore a monitor in April. she had a echo also.     • Coronary Artery Disease     pt states that she was seeing Dr. Alvarez at [facility] who did a heart cath.  she wanted to switch to Christianity.  pt states no issues now just wanted to establish.   • Hyperlipidemia     last lab done 2021 LDL was 64 on Lipitor 80       Subjective .     History of present illness:  Loren Doll is a 61 y.o. yo female who presents today establish cardiac care. She was previously treated at [facility]. She currently denies any chest pain or SOB. She denies any knowledge of arrhythmia.  Chief Complaint   Patient presents with   • New Patient     referred by BEN Torres for evaluation of ventricular fibrillation and history of MI   • Atrial Fibrillation     pt wore a monitor in April. she had a echo also.     • Coronary Artery Disease     pt states that she was seeing Dr. Alvarez at [facility] who did a heart cath.  she wanted to switch to Christianity.  pt states no issues now just wanted to establish.   • Hyperlipidemia     last lab done 2021 LDL was 64 on Lipitor 80   .     History  Past Medical History:   Diagnosis Date   • Acid reflux    • Angina pectoris ([facility])    • Arthritis    • CHF (congestive heart failure) ([facility])    • Chronic bronchitis (HCC)    • Coronary artery disease    • Depression    • Emphysema lung ([facility])    • Fibromyalgia    • Gall stones    • Hyperlipidemia    • Hypertension    • Myocardial infarction (HCC)    • Peripheral vascular disease ([facility])    • Stroke ([facility])    • Tobacco dependence    • VF (ventricular fibrillation) (Formerly [facility])    ,   Past Surgical History:   Procedure Laterality Date   • CARDIAC CATHETERIZATION     •  SECTION     •  CHOLECYSTECTOMY     ,   Family History   Problem Relation Age of Onset   • COPD Mother    • Heart disease Father    • Heart attack Sister    • Hypertension Brother    • COPD Sister    • Kidney cancer Sister    • No Known Problems Sister    • Hypertension Brother    ,   Social History     Tobacco Use   • Smoking status: Former Smoker     Packs/day: 1.50     Types: Cigarettes     Quit date: 2021     Years since quittin.5   • Smokeless tobacco: Never Used   Vaping Use   • Vaping Use: Never used   Substance Use Topics   • Alcohol use: Not Currently   • Drug use: Never   ,     Medications  Current Outpatient Medications   Medication Sig Dispense Refill   • albuterol sulfate  (90 Base) MCG/ACT inhaler Inhale 2 puffs Every 4 (Four) Hours As Needed for Wheezing or Shortness of Air.     • atenolol (TENORMIN) 50 MG tablet Take 50 mg by mouth Daily.     • atorvastatin (LIPITOR) 80 MG tablet Take 1 tablet by mouth Every Night. 30 tablet 1   • citalopram (CeleXA) 10 MG tablet Take 10 mg by mouth Daily.     • clopidogrel (PLAVIX) 75 MG tablet Take 1 tablet by mouth Daily. 30 tablet 1   • cyclobenzaprine (FLEXERIL) 10 MG tablet Take 10 mg by mouth 2 (Two) Times a Day As Needed for Muscle Spasms.     • fluticasone (FLONASE) 50 MCG/ACT nasal spray 2 sprays by Each Nare route Daily.     • Fluticasone Furoate-Vilanterol (Breo Ellipta) 100-25 MCG/INH inhaler Inhale 1 puff Daily.     • furosemide (LASIX) 20 MG tablet TAKE ONE TABLET BY MOUTH ONCE DAILY FOR EDEMA     • gabapentin (NEURONTIN) 300 MG capsule Take 300 mg by mouth 3 (Three) Times a Day.     • metFORMIN ER (GLUCOPHAGE-XR) 750 MG 24 hr tablet TAKE ONE TABLET BY MOUTH ONCE DAILY EVERY EVENING WITH MEALS     • nitroglycerin (NITRODUR) 0.2 MG/HR patch APPLY 1 PATCH ONTO THE SKIN EVERY DAY     • omeprazole (priLOSEC) 20 MG capsule Take 20 mg by mouth Daily.     • triamcinolone (KENALOG) 0.1 % cream Apply 1 application topically to the appropriate area as directed 2  "(Two) Times a Day.       No current facility-administered medications for this visit.        Allergies:  Patient has no known allergies.    Review of Systems  Review of Systems   HENT: Negative for nosebleeds.    Eyes: Positive for blurred vision and vision loss in left eye.   Cardiovascular: Negative for chest pain, claudication, dyspnea on exertion, leg swelling, near-syncope, orthopnea, palpitations, paroxysmal nocturnal dyspnea and syncope.   Respiratory: Negative for hemoptysis and shortness of breath.    Gastrointestinal: Negative for melena.   Genitourinary: Negative for hematuria.       Objective     Physical Exam:  /90   Pulse 67   Ht 152.4 cm (60\")   Wt 94.8 kg (209 lb)   SpO2 98%   BMI 40.82 kg/m²   Pulmonary:      Effort: Pulmonary effort is normal.      Breath sounds: Normal breath sounds.   Cardiovascular:      Normal rate. Regular rhythm.      Murmurs: There is no murmur.   Edema:     Peripheral edema absent.         Results Review:   I reviewed the patient's new clinical results.    ECG 12 Lead    Date/Time: 10/29/2021 11:11 AM  Performed by: Prashant Moctezuma MD  Authorized by: Prashant Moctezuma MD   Previous ECG: no previous ECG available  Rhythm: sinus rhythm  Rate: normal  Conduction: conduction normal  ST Segments: ST segments normal  T Waves: T waves normal  QRS axis: normal    Clinical impression: normal ECG            Admission on 04/13/2021, Discharged on 04/14/2021   Component Date Value Ref Range Status   • QT Interval 04/13/2021 428  ms Final   • QTC Interval 04/13/2021 475  ms Final   • Glucose 04/13/2021 102* 65 - 99 mg/dL Final   • BUN 04/13/2021 17  8 - 23 mg/dL Final   • Creatinine 04/13/2021 0.68  0.57 - 1.00 mg/dL Final   • Sodium 04/13/2021 144  136 - 145 mmol/L Final   • Potassium 04/13/2021 4.5  3.5 - 5.2 mmol/L Final   • Chloride 04/13/2021 105  98 - 107 mmol/L Final   • CO2 04/13/2021 30.0* 22.0 - 29.0 mmol/L Final   • Calcium 04/13/2021 8.9  8.6 - 10.5 mg/dL Final   • " Total Protein 04/13/2021 6.7  6.0 - 8.5 g/dL Final   • Albumin 04/13/2021 4.00  3.50 - 5.20 g/dL Final   • ALT (SGPT) 04/13/2021 22  1 - 33 U/L Final   • AST (SGOT) 04/13/2021 16  1 - 32 U/L Final   • Alkaline Phosphatase 04/13/2021 176* 39 - 117 U/L Final   • Total Bilirubin 04/13/2021 0.2  0.0 - 1.2 mg/dL Final   • eGFR Non African Amer 04/13/2021 88  >60 mL/min/1.73 Final   • eGFR  African Amer 04/13/2021 106  >60 mL/min/1.73 Final   • Globulin 04/13/2021 2.7  gm/dL Final   • A/G Ratio 04/13/2021 1.5  g/dL Final   • BUN/Creatinine Ratio 04/13/2021 25.0  7.0 - 25.0 Final   • Anion Gap 04/13/2021 9.0  5.0 - 15.0 mmol/L Final   • Protime 04/13/2021 12.9  11.9 - 14.6 Seconds Final   • INR 04/13/2021 1.01  0.91 - 1.09 Final   • ABO Type 04/13/2021 B   Final   • RH type 04/13/2021 Positive   Final   • Antibody Screen 04/13/2021 Negative   Final   • T&S Expiration Date 04/13/2021 4/16/2021 11:59:59 PM   Final   • WBC 04/13/2021 6.95  3.40 - 10.80 10*3/mm3 Final   • RBC 04/13/2021 5.01  3.77 - 5.28 10*6/mm3 Final   • Hemoglobin 04/13/2021 13.2  12.0 - 15.9 g/dL Final   • Hematocrit 04/13/2021 41.7  34.0 - 46.6 % Final   • MCV 04/13/2021 83.2  79.0 - 97.0 fL Final   • MCH 04/13/2021 26.3* 26.6 - 33.0 pg Final   • MCHC 04/13/2021 31.7  31.5 - 35.7 g/dL Final   • RDW 04/13/2021 15.2  12.3 - 15.4 % Final   • RDW-SD 04/13/2021 45.6  37.0 - 54.0 fl Final   • MPV 04/13/2021 9.3  6.0 - 12.0 fL Final   • Platelets 04/13/2021 280  140 - 450 10*3/mm3 Final   • Neutrophil % 04/13/2021 59.7  42.7 - 76.0 % Final   • Lymphocyte % 04/13/2021 32.7  19.6 - 45.3 % Final   • Monocyte % 04/13/2021 6.6  5.0 - 12.0 % Final   • Eosinophil % 04/13/2021 0.1* 0.3 - 6.2 % Final   • Basophil % 04/13/2021 0.6  0.0 - 1.5 % Final   • Immature Grans % 04/13/2021 0.3  0.0 - 0.5 % Final   • Neutrophils, Absolute 04/13/2021 4.15  1.70 - 7.00 10*3/mm3 Final   • Lymphocytes, Absolute 04/13/2021 2.27  0.70 - 3.10 10*3/mm3 Final   • Monocytes, Absolute  04/13/2021 0.46  0.10 - 0.90 10*3/mm3 Final   • Eosinophils, Absolute 04/13/2021 0.01  0.00 - 0.40 10*3/mm3 Final   • Basophils, Absolute 04/13/2021 0.04  0.00 - 0.20 10*3/mm3 Final   • Immature Grans, Absolute 04/13/2021 0.02  0.00 - 0.05 10*3/mm3 Final   • nRBC 04/13/2021 0.0  0.0 - 0.2 /100 WBC Final   • Extra Tube 04/13/2021 hold for add-on   Final    Auto resulted   • Extra Tube 04/13/2021 Hold for add-ons.   Final    Auto resulted.   • Extra Tube 04/13/2021 hold for add-on   Final    Auto resulted   • Extra Tube 04/13/2021 Hold for add-ons.   Final    Auto resulted.   • THC, Screen, Urine 04/13/2021 Positive* Negative Final   • Phencyclidine (PCP), Urine 04/13/2021 Negative  Negative Final   • Cocaine Screen, Urine 04/13/2021 Negative  Negative Final   • Methamphetamine, Ur 04/13/2021 Negative  Negative Final   • Opiate Screen 04/13/2021 Negative  Negative Final   • Amphetamine Screen, Urine 04/13/2021 Negative  Negative Final   • Benzodiazepine Screen, Urine 04/13/2021 Negative  Negative Final   • Tricyclic Antidepressants Screen 04/13/2021 Positive* Negative Final   • Methadone Screen, Urine 04/13/2021 Negative  Negative Final   • Barbiturates Screen, Urine 04/13/2021 Negative  Negative Final   • Oxycodone Screen, Urine 04/13/2021 Negative  Negative Final   • Propoxyphene Screen 04/13/2021 Negative  Negative Final   • Buprenorphine, Screen, Urine 04/13/2021 Negative  Negative Final   • Color, UA 04/13/2021 Yellow  Yellow, Straw Final   • Appearance, UA 04/13/2021 Clear  Clear Final   • pH, UA 04/13/2021 6.0  5.0 - 8.0 Final   • Specific Gravity, UA 04/13/2021 >1.030* 1.005 - 1.030 Final   • Glucose, UA 04/13/2021 Negative  Negative Final   • Ketones, UA 04/13/2021 Negative  Negative Final   • Bilirubin, UA 04/13/2021 Negative  Negative Final   • Blood, UA 04/13/2021 Negative  Negative Final   • Protein, UA 04/13/2021 Negative  Negative Final   • Leuk Esterase, UA 04/13/2021 Negative  Negative Final   •  Nitrite, UA 04/13/2021 Negative  Negative Final   • Urobilinogen, UA 04/13/2021 1.0 E.U./dL  0.2 - 1.0 E.U./dL Final   • TSH 04/13/2021 0.813  0.270 - 4.200 uIU/mL Final   • Ethanol % 04/13/2021 <0.010  % Final   • COVID19 04/13/2021 Presumptive Negative  Presumptive Negative - Ref. Range Final   • Glucose 04/13/2021 95  70 - 130 mg/dL Final    : [id number] [place] KristaMeter ID: [id number]   • Hemoglobin A1C 04/14/2021 6.40* 4.80 - 5.60 % Final   • Total Cholesterol 04/14/2021 111  0 - 200 mg/dL Final   • Triglycerides 04/14/2021 122  0 - 150 mg/dL Final   • HDL Cholesterol 04/14/2021 25* 40 - 60 mg/dL Final   • LDL Cholesterol  04/14/2021 64  0 - 100 mg/dL Final   • VLDL Cholesterol 04/14/2021 22  5 - 40 mg/dL Final   • LDL/HDL Ratio 04/14/2021 2.46   Final   • Glucose 04/14/2021 91  65 - 99 mg/dL Final   • BUN 04/14/2021 14  8 - 23 mg/dL Final   • Creatinine 04/14/2021 0.56* 0.57 - 1.00 mg/dL Final   • Sodium 04/14/2021 145  136 - 145 mmol/L Final   • Potassium 04/14/2021 4.1  3.5 - 5.2 mmol/L Final    Slight hemolysis detected by analyzer. Results may be affected.   • Chloride 04/14/2021 104  98 - 107 mmol/L Final   • CO2 04/14/2021 31.0* 22.0 - 29.0 mmol/L Final   • Calcium 04/14/2021 8.8  8.6 - 10.5 mg/dL Final   • Total Protein 04/14/2021 6.6  6.0 - 8.5 g/dL Final   • Albumin 04/14/2021 3.80  3.50 - 5.20 g/dL Final   • ALT (SGPT) 04/14/2021 19  1 - 33 U/L Final   • AST (SGOT) 04/14/2021 17  1 - 32 U/L Final   • Alkaline Phosphatase 04/14/2021 160* 39 - 117 U/L Final   • Total Bilirubin 04/14/2021 0.3  0.0 - 1.2 mg/dL Final   • eGFR Non African Amer 04/14/2021 110  >60 mL/min/1.73 Final   • Globulin 04/14/2021 2.8  gm/dL Final   • A/G Ratio 04/14/2021 1.4  g/dL Final   • BUN/Creatinine Ratio 04/14/2021 25.0  7.0 - 25.0 Final   • Anion Gap 04/14/2021 10.0  5.0 - 15.0 mmol/L Final   • WBC 04/14/2021 5.57  3.40 - 10.80 10*3/mm3 Final   • RBC 04/14/2021 5.07  3.77 - 5.28 10*6/mm3 Final   • Hemoglobin 04/14/2021  13.4  12.0 - 15.9 g/dL Final   • Hematocrit 04/14/2021 42.2  34.0 - 46.6 % Final   • MCV 04/14/2021 83.2  79.0 - 97.0 fL Final   • MCH 04/14/2021 26.4* 26.6 - 33.0 pg Final   • MCHC 04/14/2021 31.8  31.5 - 35.7 g/dL Final   • RDW 04/14/2021 15.3  12.3 - 15.4 % Final   • RDW-SD 04/14/2021 45.6  37.0 - 54.0 fl Final   • MPV 04/14/2021 9.2  6.0 - 12.0 fL Final   • Platelets 04/14/2021 258  140 - 450 10*3/mm3 Final   • TSH 04/14/2021 0.763  0.270 - 4.200 uIU/mL Final   • Vitamin B-12 04/14/2021 372  211 - 946 pg/mL Final   • BSA 04/14/2021 1.9  m^2 Final   • IVSd 04/14/2021 1.3  cm Final   • LVIDd 04/14/2021 3.0  cm Final   • LVIDs 04/14/2021 2.5  cm Final   • LVPWd 04/14/2021 0.9  cm Final   • IVS/LVPW 04/14/2021 1.4   Final   • FS 04/14/2021 18.2  % Final   • EDV(Teich) 04/14/2021 35.9  ml Final   • ESV(Teich) 04/14/2021 21.9  ml Final   • EF(Teich) 04/14/2021 39.0  % Final   • EDV(cubed) 04/14/2021 27.8  ml Final   • ESV(cubed) 04/14/2021 15.3  ml Final   • EF(cubed) 04/14/2021 45.2  % Final   • LV mass(C)d 04/14/2021 96.8  grams Final   • LV mass(C)dI 04/14/2021 52.3  grams/m^2 Final   • SV(Teich) 04/14/2021 14.0  ml Final   • SI(Teich) 04/14/2021 7.6  ml/m^2 Final   • SV(cubed) 04/14/2021 12.6  ml Final   • SI(cubed) 04/14/2021 6.8  ml/m^2 Final   • Ao root diam 04/14/2021 2.5  cm Final   • Ao root area 04/14/2021 4.9  cm^2 Final   • LA dimension 04/14/2021 2.8  cm Final   • LA/Ao 04/14/2021 1.1   Final   • LVOT diam 04/14/2021 2.0  cm Final   • LVOT area 04/14/2021 3.1  cm^2 Final   • LVOT area(traced) 04/14/2021 3.1  cm^2 Final   • LVLd ap4 04/14/2021 7.4  cm Final   • EDV(MOD-sp4) 04/14/2021 80.2  ml Final   • LVLs ap4 04/14/2021 5.9  cm Final   • ESV(MOD-sp4) 04/14/2021 19.7  ml Final   • EF(MOD-sp4) 04/14/2021 75.4  % Final   • SV(MOD-sp4) 04/14/2021 60.5  ml Final   • SI(MOD-sp4) 04/14/2021 32.7  ml/m^2 Final   • Ao root area (BSA corrected) 04/14/2021 1.4   Final   • LV Alvarado Vol (BSA corrected) 04/14/2021  43.3  ml/m^2 Final   • LV Sys Vol (BSA corrected) 04/14/2021 10.6  ml/m^2 Final   • MV E max rip 04/14/2021 117.0  cm/sec Final   • MV A max rip 04/14/2021 144.0  cm/sec Final   • MV E/A 04/14/2021 0.81   Final   • MV dec time 04/14/2021 0.44  sec Final   • Ao pk rip 04/14/2021 153.0  cm/sec Final   • Ao max PG 04/14/2021 9.4  mmHg Final   • Ao max PG (full) 04/14/2021 7.2  mmHg Final   • Ao V2 mean 04/14/2021 102.0  cm/sec Final   • Ao mean PG 04/14/2021 5.0  mmHg Final   • Ao mean PG (full) 04/14/2021 4.0  mmHg Final   • Ao V2 VTI 04/14/2021 37.3  cm Final   • ALEXYS(I,A) 04/14/2021 1.4  cm^2 Final   • ALEXYS(I,D) 04/14/2021 1.4  cm^2 Final   • ALEXYS(V,A) 04/14/2021 1.5  cm^2 Final   • ALEXYS(V,D) 04/14/2021 1.5  cm^2 Final   • LV V1 max PG 04/14/2021 2.1  mmHg Final   • LV V1 mean PG 04/14/2021 1.0  mmHg Final   • LV V1 max 04/14/2021 73.2  cm/sec Final   • LV V1 mean 04/14/2021 46.5  cm/sec Final   • LV V1 VTI 04/14/2021 17.0  cm Final   • MR max rip 04/14/2021 487.3  cm/sec Final   • MR max PG 04/14/2021 95.7  mmHg Final   • MR mean rip 04/14/2021 402.0  cm/sec Final   • MR mean PG 04/14/2021 73.0  mmHg Final   • MR VTI 04/14/2021 187.0  cm Final   • SV(Ao) 04/14/2021 183.1  ml Final   • SI(Ao) 04/14/2021 98.9  ml/m^2 Final   • SV(LVOT) 04/14/2021 53.4  ml Final   • SI(LVOT) 04/14/2021 28.9  ml/m^2 Final   •  CV ECHO SHAVONNE - BZI_BMI 04/14/2021 38.3  kilograms/m^2 Final   •  CV ECHO SHAVONNE - BSA(HAYCOCK) 04/14/2021 2.0  m^2 Final   •  CV ECHO SHAVONNE - BZI_METRIC_WEIGHT 04/14/2021 88.9  kg Final   •  CV ECHO SHAVONNE - BZI_METRIC_HEIGHT 04/14/2021 152.4  cm Final   • Target HR (85%) 04/14/2021 134  bpm Final   • Max. Pred. HR (100%) 04/14/2021 158  bpm Final   • LA volume 04/14/2021 43.2  cm3 Final   • LA Volume Index 04/14/2021 23.4  mL/m2 Final   • Avg E/e' ratio 04/14/2021 20.54   Final   • Lat Peak E' Rip 04/14/2021 5.3  cm/sec Final   • Med Peak E' Rip 04/14/2021 6.09  cm/sec Final       Assessment/Plan   Problem List  Items Addressed This Visit        Cardiac and Vasculature    Coronary artery disease involving native coronary artery of native heart    Current Assessment & Plan     Has a  of the distal LCX         Relevant Medications    nitroglycerin (NITRODUR) 0.2 MG/HR patch    Hyperlipidemia - Primary    Current Assessment & Plan     Adequate control         Essential hypertension    Current Assessment & Plan     Running high today but runs better at home         Relevant Medications    furosemide (LASIX) 20 MG tablet       Endocrine and Metabolic    Type 2 diabetes mellitus with hypoglycemia without coma, without long-term current use of insulin ([facility])    Current Assessment & Plan     Borderline control         Relevant Medications    metFORMIN ER (GLUCOPHAGE-XR) 750 MG 24 hr tablet       Neuro    CVA (cerebral vascular accident) ([facility])    Current Assessment & Plan     Lacunar stroke, symptoms have resolved            Tobacco    Tobacco use    Current Assessment & Plan     Has quit since April               Medical Complexity  must have 2 out of 3     Moderate Complexity Level 4           1 of the following medical problems:          []One chronic illness with mild exacerbation         []Two or more stable chronic illness          []One new problem  []One acute illness with systemic symptoms    Complexity of Data  Reviewed (1 out of the 3 following categories)      Category 1 tests, documents, historian (must have 3 points)       []Review of prior external records  []Review of results of unique tests  []Ordering unique tests  []Assessment requires an independent historian    Category 2 Interpretation of tests   []Independent interpretation of test read by another doc    Category 3 Discuss Management/tests  []Discussion with external physician    Risk of complications and/or morbidity          []   Prescription Drug Management          []   Decision regarding minor surgery with pt         []   Decision regarding elective  major surgery         []   Diagnosis or treatment of significantly limited by social determinants                      High Complexity Level 5  1 of the following:  []Severe exacerbation of a chronic illness  []Acute or chronic illness that is a threat to life    Complexity of Data Reviewed (2 out of 3)  []Same as Level 4 for all 3 categories    Risk of Complications  []Drug therapy requiring intensive monitoring  []Decision regarding elective major surgery  []Decision for major emergent surgery  []Decision for hospitalization  []Decision for DNR status**

## 2021-12-13 ENCOUNTER — HOSPITAL ENCOUNTER (OUTPATIENT)
Dept: SLEEP MEDICINE | Facility: HOSPITAL | Age: 61
End: 2021-12-13

## 2022-05-19 NOTE — TELEPHONE ENCOUNTER
Caller: CHUY KSENIA    Relationship: SELF    Best call back number: [phone]    Requested Prescriptions:   Requested Prescriptions     Pending Prescriptions Disp Refills   • atenolol (TENORMIN) 50 MG tablet       Sig: Take 1 tablet by mouth Daily.   • nitroglycerin (NITRODUR) 0.2 MG/HR patch          Pharmacy where request should be sent: [id number] - [place], [place] - [address] - [phone] [facility] [phone] FX     Additional details provided by patient: HAS ABOUT 5 PILLS LEFT     Does the patient have less than a 3 day supply:  [] Yes  [x] No    Rodolfo Hutchins Rep   05/19/22 09:25 CDT

## 2022-05-24 RX ORDER — NITROGLYCERIN 40 MG/1
1 PATCH TRANSDERMAL DAILY
Qty: 30 PATCH | Refills: 11 | Status: SHIPPED | OUTPATIENT
Start: 2022-05-24 | End: 2023-03-01

## 2022-05-24 RX ORDER — ATENOLOL 50 MG/1
50 TABLET ORAL DAILY
Qty: 90 TABLET | Refills: 3 | Status: SHIPPED | OUTPATIENT
Start: 2022-05-24

## 2022-11-04 ENCOUNTER — OFFICE VISIT (OUTPATIENT)
Dept: CARDIOLOGY | Facility: CLINIC | Age: 62
End: 2022-11-04

## 2022-11-04 VITALS
BODY MASS INDEX: 40.44 KG/M2 | SYSTOLIC BLOOD PRESSURE: 122 MMHG | HEART RATE: 75 BPM | DIASTOLIC BLOOD PRESSURE: 72 MMHG | OXYGEN SATURATION: 96 % | WEIGHT: 206 LBS | HEIGHT: 60 IN

## 2022-11-04 DIAGNOSIS — I10 ESSENTIAL HYPERTENSION: ICD-10-CM

## 2022-11-04 DIAGNOSIS — E78.2 MIXED HYPERLIPIDEMIA: ICD-10-CM

## 2022-11-04 DIAGNOSIS — I25.10 CORONARY ARTERY DISEASE INVOLVING NATIVE CORONARY ARTERY OF NATIVE HEART WITHOUT ANGINA PECTORIS: Primary | ICD-10-CM

## 2022-11-04 PROCEDURE — 99213 OFFICE O/P EST LOW 20 MIN: CPT | Performed by: INTERNAL MEDICINE

## 2022-11-04 PROCEDURE — 93000 ELECTROCARDIOGRAM COMPLETE: CPT | Performed by: INTERNAL MEDICINE

## 2022-11-04 RX ORDER — FAMOTIDINE 40 MG/1
TABLET, FILM COATED ORAL DAILY
COMMUNITY
Start: 2022-10-03

## 2022-11-04 NOTE — PROGRESS NOTES
Referring Provider: Nolan Hoffman Jr., MD    Reason for Follow-up Visit: CAD    Subjective .   Chief Complaint:   Chief Complaint   Patient presents with   • Coronary Artery Disease     Yearly pt states she has been doing well with no complaints.   • Hypertension     Pt states this has been doing good.   • Hyperlipidemia     Last lab done 2021 LDL was 64 on Lipitor 80 mg.  Pt states her pcp does lab every 6 months and is due for some soon.       History of present illness:  Loren Doll is a 62 y.o. yo female with HTN. She had a cath done at [facility] showing 30-40% LCX disease. She denies any chest pain or SOB.       History  Past Medical History:   Diagnosis Date   • Acid reflux    • Angina pectoris (HCC)    • Arthritis    • CHF (congestive heart failure) ([facility])    • Chronic bronchitis (HCC)    • Coronary artery disease    • Depression    • Emphysema lung (HCC)    • Fibromyalgia    • Gall stones    • Hyperlipidemia    • Hypertension    • Macular degeneration    • Myocardial infarction (HCC)    • Peripheral vascular disease (HCC)    • Stroke ([facility])    • Tobacco dependence    • VF (ventricular fibrillation) ([facility])    ,   Past Surgical History:   Procedure Laterality Date   • CARDIAC CATHETERIZATION     •  SECTION     • CHOLECYSTECTOMY     ,   Family History   Problem Relation Age of Onset   • COPD Mother    • Heart disease Father    • Heart attack Sister    • Hypertension Brother    • COPD Sister    • Kidney cancer Sister    • No Known Problems Sister    • Hypertension Brother    ,   Social History     Tobacco Use   • Smoking status: Former     Packs/day: 1.50     Types: Cigarettes     Quit date: 2021     Years since quittin.5   • Smokeless tobacco: Never   Vaping Use   • Vaping Use: Never used   Substance Use Topics   • Alcohol use: Not Currently   • Drug use: Never   ,     Medications  Current Outpatient Medications   Medication Sig Dispense Refill   • albuterol sulfate  (90 Base)  "MCG/ACT inhaler Inhale 2 puffs Every 4 (Four) Hours As Needed for Wheezing or Shortness of Air.     • atenolol (TENORMIN) 50 MG tablet Take 1 tablet by mouth Daily. 90 tablet 3   • atorvastatin (LIPITOR) 80 MG tablet Take 1 tablet by mouth Every Night. 30 tablet 1   • citalopram (CeleXA) 10 MG tablet Take 10 mg by mouth Daily.     • clopidogrel (PLAVIX) 75 MG tablet Take 1 tablet by mouth Daily. 30 tablet 1   • cyclobenzaprine (FLEXERIL) 10 MG tablet Take 10 mg by mouth 2 (Two) Times a Day As Needed for Muscle Spasms.     • fluticasone (FLONASE) 50 MCG/ACT nasal spray 2 sprays by Each Nare route Daily.     • Fluticasone Furoate-Vilanterol (Breo Ellipta) 100-25 MCG/INH inhaler Inhale 1 puff Daily.     • furosemide (LASIX) 20 MG tablet TAKE ONE TABLET BY MOUTH ONCE DAILY FOR EDEMA     • gabapentin (NEURONTIN) 300 MG capsule Take 300 mg by mouth 3 (Three) Times a Day.     • metFORMIN ER (GLUCOPHAGE-XR) 750 MG 24 hr tablet TAKE ONE TABLET BY MOUTH ONCE DAILY EVERY EVENING WITH MEALS     • nitroglycerin (NITRODUR) 0.2 MG/HR patch Place 1 patch on the skin as directed by provider Daily. 30 patch 11   • omeprazole (priLOSEC) 20 MG capsule Take 20 mg by mouth Daily.     • triamcinolone (KENALOG) 0.1 % cream Apply 1 application topically to the appropriate area as directed 2 (Two) Times a Day.     • famotidine (PEPCID) 40 MG tablet Daily.       No current facility-administered medications for this visit.       Allergies:  Adhesive tape    Review of Systems  Review of Systems   HENT: Negative for nosebleeds.    Cardiovascular: Negative for chest pain, claudication, dyspnea on exertion, leg swelling, near-syncope, orthopnea, palpitations, paroxysmal nocturnal dyspnea and syncope.   Respiratory: Negative for hemoptysis and shortness of breath.    Gastrointestinal: Negative for melena.   Genitourinary: Negative for hematuria.       Objective     Physical Exam:  /72   Pulse 75   Ht 152.4 cm (60\")   Wt 93.4 kg (206 lb)   " SpO2 96%   BMI 40.23 kg/m²   Pulmonary:      Effort: Pulmonary effort is normal.      Breath sounds: Normal breath sounds.   Cardiovascular:      Normal rate. Regular rhythm.      Murmurs: There is no murmur.   Edema:     Peripheral edema absent.         Results Review:    ECG 12 Lead    Date/Time: 11/4/2022 11:05 AM  Performed by: Prashant Moctezuma MD  Authorized by: Prashant Moctezuma MD   Comparison: compared with previous ECG   Similar to previous ECG  Rhythm: sinus rhythm  Rate: normal  Conduction: conduction normal  ST Segments: ST segments normal  T Waves: T waves normal  QRS axis: normal  Other findings: low voltage    Clinical impression: non-specific ECG            Admission on 04/13/2021, Discharged on 04/14/2021   Component Date Value Ref Range Status   • QT Interval 04/13/2021 428  ms Final   • QTC Interval 04/13/2021 475  ms Final   • Glucose 04/13/2021 102 (H)  65 - 99 mg/dL Final   • BUN 04/13/2021 17  8 - 23 mg/dL Final   • Creatinine 04/13/2021 0.68  0.57 - 1.00 mg/dL Final   • Sodium 04/13/2021 144  136 - 145 mmol/L Final   • Potassium 04/13/2021 4.5  3.5 - 5.2 mmol/L Final   • Chloride 04/13/2021 105  98 - 107 mmol/L Final   • CO2 04/13/2021 30.0 (H)  22.0 - 29.0 mmol/L Final   • Calcium 04/13/2021 8.9  8.6 - 10.5 mg/dL Final   • Total Protein 04/13/2021 6.7  6.0 - 8.5 g/dL Final   • Albumin 04/13/2021 4.00  3.50 - 5.20 g/dL Final   • ALT (SGPT) 04/13/2021 22  1 - 33 U/L Final   • AST (SGOT) 04/13/2021 16  1 - 32 U/L Final   • Alkaline Phosphatase 04/13/2021 176 (H)  39 - 117 U/L Final   • Total Bilirubin 04/13/2021 0.2  0.0 - 1.2 mg/dL Final   • eGFR Non African Amer 04/13/2021 88  >60 mL/min/1.73 Final   • eGFR  African Amer 04/13/2021 106  >60 mL/min/1.73 Final   • Globulin 04/13/2021 2.7  gm/dL Final   • A/G Ratio 04/13/2021 1.5  g/dL Final   • BUN/Creatinine Ratio 04/13/2021 25.0  7.0 - 25.0 Final   • Anion Gap 04/13/2021 9.0  5.0 - 15.0 mmol/L Final   • Protime 04/13/2021 12.9  11.9 - 14.6  Seconds Final   • INR 04/13/2021 1.01  0.91 - 1.09 Final   • ABO Type 04/13/2021 B   Final   • RH type 04/13/2021 Positive   Final   • Antibody Screen 04/13/2021 Negative   Final   • T&S Expiration Date 04/13/2021 4/16/2021 11:59:59 PM   Final   • WBC 04/13/2021 6.95  3.40 - 10.80 10*3/mm3 Final   • RBC 04/13/2021 5.01  3.77 - 5.28 10*6/mm3 Final   • Hemoglobin 04/13/2021 13.2  12.0 - 15.9 g/dL Final   • Hematocrit 04/13/2021 41.7  34.0 - 46.6 % Final   • MCV 04/13/2021 83.2  79.0 - 97.0 fL Final   • MCH 04/13/2021 26.3 (L)  26.6 - 33.0 pg Final   • MCHC 04/13/2021 31.7  31.5 - 35.7 g/dL Final   • RDW 04/13/2021 15.2  12.3 - 15.4 % Final   • RDW-SD 04/13/2021 45.6  37.0 - 54.0 fl Final   • MPV 04/13/2021 9.3  6.0 - 12.0 fL Final   • Platelets 04/13/2021 280  140 - 450 10*3/mm3 Final   • Neutrophil % 04/13/2021 59.7  42.7 - 76.0 % Final   • Lymphocyte % 04/13/2021 32.7  19.6 - 45.3 % Final   • Monocyte % 04/13/2021 6.6  5.0 - 12.0 % Final   • Eosinophil % 04/13/2021 0.1 (L)  0.3 - 6.2 % Final   • Basophil % 04/13/2021 0.6  0.0 - 1.5 % Final   • Immature Grans % 04/13/2021 0.3  0.0 - 0.5 % Final   • Neutrophils, Absolute 04/13/2021 4.15  1.70 - 7.00 10*3/mm3 Final   • Lymphocytes, Absolute 04/13/2021 2.27  0.70 - 3.10 10*3/mm3 Final   • Monocytes, Absolute 04/13/2021 0.46  0.10 - 0.90 10*3/mm3 Final   • Eosinophils, Absolute 04/13/2021 0.01  0.00 - 0.40 10*3/mm3 Final   • Basophils, Absolute 04/13/2021 0.04  0.00 - 0.20 10*3/mm3 Final   • Immature Grans, Absolute 04/13/2021 0.02  0.00 - 0.05 10*3/mm3 Final   • nRBC 04/13/2021 0.0  0.0 - 0.2 /100 WBC Final   • Extra Tube 04/13/2021 hold for add-on   Final    Auto resulted   • Extra Tube 04/13/2021 Hold for add-ons.   Final    Auto resulted.   • Extra Tube 04/13/2021 hold for add-on   Final    Auto resulted   • Extra Tube 04/13/2021 Hold for add-ons.   Final    Auto resulted.   • THC, Screen, Urine 04/13/2021 Positive (A)  Negative Final   • Phencyclidine (PCP), Urine  04/13/2021 Negative  Negative Final   • Cocaine Screen, Urine 04/13/2021 Negative  Negative Final   • Methamphetamine, Ur 04/13/2021 Negative  Negative Final   • Opiate Screen 04/13/2021 Negative  Negative Final   • Amphetamine Screen, Urine 04/13/2021 Negative  Negative Final   • Benzodiazepine Screen, Urine 04/13/2021 Negative  Negative Final   • Tricyclic Antidepressants Screen 04/13/2021 Positive (A)  Negative Final   • Methadone Screen, Urine 04/13/2021 Negative  Negative Final   • Barbiturates Screen, Urine 04/13/2021 Negative  Negative Final   • Oxycodone Screen, Urine 04/13/2021 Negative  Negative Final   • Propoxyphene Screen 04/13/2021 Negative  Negative Final   • Buprenorphine, Screen, Urine 04/13/2021 Negative  Negative Final   • Color, UA 04/13/2021 Yellow  Yellow, Straw Final   • Appearance, UA 04/13/2021 Clear  Clear Final   • pH, UA 04/13/2021 6.0  5.0 - 8.0 Final   • Specific Gravity, UA 04/13/2021 >1.030 (H)  1.005 - 1.030 Final   • Glucose, UA 04/13/2021 Negative  Negative Final   • Ketones, UA 04/13/2021 Negative  Negative Final   • Bilirubin, UA 04/13/2021 Negative  Negative Final   • Blood, UA 04/13/2021 Negative  Negative Final   • Protein, UA 04/13/2021 Negative  Negative Final   • Leuk Esterase, UA 04/13/2021 Negative  Negative Final   • Nitrite, UA 04/13/2021 Negative  Negative Final   • Urobilinogen, UA 04/13/2021 1.0 E.U./dL  0.2 - 1.0 E.U./dL Final   • TSH 04/13/2021 0.813  0.270 - 4.200 uIU/mL Final   • Ethanol % 04/13/2021 <0.010  % Final   • COVID19 04/13/2021 Presumptive Negative  Presumptive Negative - Ref. Range Final   • Glucose 04/13/2021 95  70 - 130 mg/dL Final    : [id number] Quinn KristaMeter ID: [id number]   • Hemoglobin A1C 04/14/2021 6.40 (H)  4.80 - 5.60 % Final   • Total Cholesterol 04/14/2021 111  0 - 200 mg/dL Final   • Triglycerides 04/14/2021 122  0 - 150 mg/dL Final   • HDL Cholesterol 04/14/2021 25 (L)  40 - 60 mg/dL Final   • LDL Cholesterol  04/14/2021 64  0  - 100 mg/dL Final   • VLDL Cholesterol 04/14/2021 22  5 - 40 mg/dL Final   • LDL/HDL Ratio 04/14/2021 2.46   Final   • Glucose 04/14/2021 91  65 - 99 mg/dL Final   • BUN 04/14/2021 14  8 - 23 mg/dL Final   • Creatinine 04/14/2021 0.56 (L)  0.57 - 1.00 mg/dL Final   • Sodium 04/14/2021 145  136 - 145 mmol/L Final   • Potassium 04/14/2021 4.1  3.5 - 5.2 mmol/L Final    Slight hemolysis detected by analyzer. Results may be affected.   • Chloride 04/14/2021 104  98 - 107 mmol/L Final   • CO2 04/14/2021 31.0 (H)  22.0 - 29.0 mmol/L Final   • Calcium 04/14/2021 8.8  8.6 - 10.5 mg/dL Final   • Total Protein 04/14/2021 6.6  6.0 - 8.5 g/dL Final   • Albumin 04/14/2021 3.80  3.50 - 5.20 g/dL Final   • ALT (SGPT) 04/14/2021 19  1 - 33 U/L Final   • AST (SGOT) 04/14/2021 17  1 - 32 U/L Final   • Alkaline Phosphatase 04/14/2021 160 (H)  39 - 117 U/L Final   • Total Bilirubin 04/14/2021 0.3  0.0 - 1.2 mg/dL Final   • eGFR Non African Amer 04/14/2021 110  >60 mL/min/1.73 Final   • Globulin 04/14/2021 2.8  gm/dL Final   • A/G Ratio 04/14/2021 1.4  g/dL Final   • BUN/Creatinine Ratio 04/14/2021 25.0  7.0 - 25.0 Final   • Anion Gap 04/14/2021 10.0  5.0 - 15.0 mmol/L Final   • WBC 04/14/2021 5.57  3.40 - 10.80 10*3/mm3 Final   • RBC 04/14/2021 5.07  3.77 - 5.28 10*6/mm3 Final   • Hemoglobin 04/14/2021 13.4  12.0 - 15.9 g/dL Final   • Hematocrit 04/14/2021 42.2  34.0 - 46.6 % Final   • MCV 04/14/2021 83.2  79.0 - 97.0 fL Final   • MCH 04/14/2021 26.4 (L)  26.6 - 33.0 pg Final   • MCHC 04/14/2021 31.8  31.5 - 35.7 g/dL Final   • RDW 04/14/2021 15.3  12.3 - 15.4 % Final   • RDW-SD 04/14/2021 45.6  37.0 - 54.0 fl Final   • MPV 04/14/2021 9.2  6.0 - 12.0 fL Final   • Platelets 04/14/2021 258  140 - 450 10*3/mm3 Final   • TSH 04/14/2021 0.763  0.270 - 4.200 uIU/mL Final   • Vitamin B-12 04/14/2021 372  211 - 946 pg/mL Final   • BSA 04/14/2021 1.9  m^2 Final   • IVSd 04/14/2021 1.3  cm Final   • LVIDd 04/14/2021 3.0  cm Final   • LVIDs  04/14/2021 2.5  cm Final   • LVPWd 04/14/2021 0.9  cm Final   • IVS/LVPW 04/14/2021 1.4   Final   • FS 04/14/2021 18.2  % Final   • EDV(Teich) 04/14/2021 35.9  ml Final   • ESV(Teich) 04/14/2021 21.9  ml Final   • EF(Teich) 04/14/2021 39.0  % Final   • EDV(cubed) 04/14/2021 27.8  ml Final   • ESV(cubed) 04/14/2021 15.3  ml Final   • EF(cubed) 04/14/2021 45.2  % Final   • LV mass(C)d 04/14/2021 96.8  grams Final   • LV mass(C)dI 04/14/2021 52.3  grams/m^2 Final   • SV(Teich) 04/14/2021 14.0  ml Final   • SI(Teich) 04/14/2021 7.6  ml/m^2 Final   • SV(cubed) 04/14/2021 12.6  ml Final   • SI(cubed) 04/14/2021 6.8  ml/m^2 Final   • Ao root diam 04/14/2021 2.5  cm Final   • Ao root area 04/14/2021 4.9  cm^2 Final   • LA dimension (2D)  04/14/2021 2.8  cm Final   • LA/Ao 04/14/2021 1.1   Final   • LVOT diam 04/14/2021 2.0  cm Final   • LVOT area 04/14/2021 3.1  cm^2 Final   • LVOT area(traced) 04/14/2021 3.1  cm^2 Final   • LVLd ap4 04/14/2021 7.4  cm Final   • EDV(MOD-sp4) 04/14/2021 80.2  ml Final   • LVLs ap4 04/14/2021 5.9  cm Final   • ESV(MOD-sp4) 04/14/2021 19.7  ml Final   • EF(MOD-sp4) 04/14/2021 75.4  % Final   • SV(MOD-sp4) 04/14/2021 60.5  ml Final   • SI(MOD-sp4) 04/14/2021 32.7  ml/m^2 Final   • Ao root area (BSA corrected) 04/14/2021 1.4   Final   • LV Alvarado Vol (BSA corrected) 04/14/2021 43.3  ml/m^2 Final   • LV Sys Vol (BSA corrected) 04/14/2021 10.6  ml/m^2 Final   • MV E max norberto 04/14/2021 117.0  cm/sec Final   • MV A max norberto 04/14/2021 144.0  cm/sec Final   • MV E/A 04/14/2021 0.81   Final   • MV dec time 04/14/2021 0.44  sec Final   • Ao pk norberto 04/14/2021 153.0  cm/sec Final   • Ao max PG 04/14/2021 9.4  mmHg Final   • Ao max PG (full) 04/14/2021 7.2  mmHg Final   • Ao V2 mean 04/14/2021 102.0  cm/sec Final   • Ao mean PG 04/14/2021 5.0  mmHg Final   • Ao mean PG (full) 04/14/2021 4.0  mmHg Final   • Ao V2 VTI 04/14/2021 37.3  cm Final   • ALEXYS(I,A) 04/14/2021 1.4  cm^2 Final   • ALEXYS(I,D) 04/14/2021 1.4   cm^2 Final   • ALEXYS(V,A) 04/14/2021 1.5  cm^2 Final   • ALEXYS(V,D) 04/14/2021 1.5  cm^2 Final   • LV V1 max PG 04/14/2021 2.1  mmHg Final   • LV V1 mean PG 04/14/2021 1.0  mmHg Final   • LV V1 max 04/14/2021 73.2  cm/sec Final   • LV V1 mean 04/14/2021 46.5  cm/sec Final   • LV V1 VTI 04/14/2021 17.0  cm Final   • MR max rip 04/14/2021 487.3  cm/sec Final   • MR max PG 04/14/2021 95.7  mmHg Final   • MR mean rip 04/14/2021 402.0  cm/sec Final   • MR mean PG 04/14/2021 73.0  mmHg Final   • MR VTI 04/14/2021 187.0  cm Final   • SV(Ao) 04/14/2021 183.1  ml Final   • SI(Ao) 04/14/2021 98.9  ml/m^2 Final   • SV(LVOT) 04/14/2021 53.4  ml Final   • SI(LVOT) 04/14/2021 28.9  ml/m^2 Final   • BH CV ECHO SHAVONNE - BZI_BMI 04/14/2021 38.3  kilograms/m^2 Final   •  CV ECHO SHAVONNE - BSA(H[place]) 04/14/2021 2.0  m^2 Final   •  CV ECHO SHAVONNE - BZI_METRIC_WEIGHT 04/14/2021 88.9  kg Final   •  CV ECHO SHAVONNE - BZI_METRIC_HEIGHT 04/14/2021 152.4  cm Final   • Target HR (85%) 04/14/2021 134  bpm Final   • Max. Pred. HR (100%) 04/14/2021 158  bpm Final   • LA ESV (BP) 04/14/2021 43.2  cm3 Final   • LA ESV Index (BP) 04/14/2021 23.4  mL/m2 Final   • Avg E/e' ratio 04/14/2021 20.54   Final   • Lat Peak E' Rip 04/14/2021 5.3  cm/sec Final   • Med Peak E' Rip 04/14/2021 6.09  cm/sec Final       Assessment & Plan   Problem List Items Addressed This Visit        Cardiac and Vasculature    Coronary artery disease involving native coronary artery of native heart - Primary    Current Assessment & Plan     The patient denies chest pain, shortness of breath, dyspnea on exertion, orthopnea, PND, edema. There is no evidence of ongoing ischemia           Mixed hyperlipidemia    Current Assessment & Plan     Good response to statin         Essential hypertension    Current Assessment & Plan     Good control            Medical Complexity  must have 1 out of 3     Moderate Complexity Level 3           1 of the following medical problems:          []One  chronic illness with mild exacerbation         [x]Two or more stable chronic illness          []One new problem  []One acute illness with systemic symptoms    Complexity of Data  Reviewed (1 out of the 3 following categories)      Category 1 tests, documents, historian (must have 3 points)       []Review of prior external records  [x]Review of results of unique tests  []Ordering unique tests  []Assessment requires an independent historian    Category 2 Interpretation of tests   []Independent interpretation of test read by another doc    Category 3 Discuss Management/tests  []Discussion with external physician    Risk of complications and/or morbidity          [x]Prescription Drug Management

## 2023-03-02 RX ORDER — NITROGLYCERIN 40 MG/1
1 PATCH TRANSDERMAL DAILY
Qty: 90 PATCH | Refills: 3 | Status: SHIPPED | OUTPATIENT
Start: 2023-03-02

## 2023-05-08 RX ORDER — ATENOLOL 50 MG/1
TABLET ORAL
Qty: 90 TABLET | Refills: 3 | Status: SHIPPED | OUTPATIENT
Start: 2023-05-08

## 2023-07-27 ENCOUNTER — TRANSCRIBE ORDERS (OUTPATIENT)
Dept: ADMINISTRATIVE | Facility: HOSPITAL | Age: 63
End: 2023-07-27
Payer: MEDICARE

## 2023-07-27 DIAGNOSIS — Z78.0 ASYMPTOMATIC MENOPAUSAL STATE: ICD-10-CM

## 2023-07-27 DIAGNOSIS — Z12.31 SCREENING MAMMOGRAM, ENCOUNTER FOR: Primary | ICD-10-CM

## 2023-08-28 ENCOUNTER — HOSPITAL ENCOUNTER (OUTPATIENT)
Dept: BONE DENSITY | Facility: HOSPITAL | Age: 63
Discharge: HOME | End: 2023-08-28
Payer: MEDICARE

## 2023-08-28 ENCOUNTER — HOSPITAL ENCOUNTER (OUTPATIENT)
Dept: MAMMOGRAPHY | Facility: HOSPITAL | Age: 63
Discharge: HOME | End: 2023-08-28
Payer: MEDICARE

## 2023-08-28 DIAGNOSIS — Z12.31 SCREENING MAMMOGRAM, ENCOUNTER FOR: ICD-10-CM

## 2023-08-28 DIAGNOSIS — Z78.0 ASYMPTOMATIC MENOPAUSAL STATE: ICD-10-CM

## 2023-08-28 PROCEDURE — 77080 DXA BONE DENSITY AXIAL: CPT

## 2023-08-28 PROCEDURE — 77063 BREAST TOMOSYNTHESIS BI: CPT

## 2023-08-28 PROCEDURE — 77067 SCR MAMMO BI INCL CAD: CPT

## 2023-11-10 ENCOUNTER — OFFICE VISIT (OUTPATIENT)
Dept: CARDIOLOGY | Facility: CLINIC | Age: 63
End: 2023-11-10
Payer: MEDICARE

## 2023-11-10 VITALS
HEIGHT: 60 IN | WEIGHT: 193 LBS | OXYGEN SATURATION: 97 % | HEART RATE: 64 BPM | BODY MASS INDEX: 37.89 KG/M2 | DIASTOLIC BLOOD PRESSURE: 60 MMHG | SYSTOLIC BLOOD PRESSURE: 118 MMHG

## 2023-11-10 DIAGNOSIS — I63.9 CEREBROVASCULAR ACCIDENT (CVA), UNSPECIFIED MECHANISM: ICD-10-CM

## 2023-11-10 DIAGNOSIS — I10 ESSENTIAL HYPERTENSION: ICD-10-CM

## 2023-11-10 DIAGNOSIS — E78.2 MIXED HYPERLIPIDEMIA: ICD-10-CM

## 2023-11-10 DIAGNOSIS — I25.10 CORONARY ARTERY DISEASE INVOLVING NATIVE CORONARY ARTERY OF NATIVE HEART WITHOUT ANGINA PECTORIS: Primary | ICD-10-CM

## 2023-11-10 RX ORDER — MELATONIN
1000 DAILY
COMMUNITY

## 2023-11-10 RX ORDER — MULTIVIT WITH MINERALS/LUTEIN
250 TABLET ORAL DAILY
COMMUNITY

## 2023-11-10 NOTE — PROGRESS NOTES
Referring Provider: Nolan Hoffman Jr., MD    Reason for Follow-up Visit: CAD    Subjective .   Chief Complaint:   Chief Complaint   Patient presents with    Coronary Artery Disease     Yearly pt states she has been doing well with no issues of symptoms.    Hypertension     Pt states this is good.    Hyperlipidemia     Last lab done 10/2023 LDL was 65 on Lipitor 80 mg       History of present illness:  Loren Doll is a 63 y.o. yo female with CAD, s/p PERLA in the past in for routine follow up. She denies any chest pain or SOB.       History  Past Medical History:   Diagnosis Date    Acid reflux     Angina pectoris     Arthritis     CHF (congestive heart failure)     Chronic bronchitis     Coronary artery disease     Depression     Emphysema lung     Fibromyalgia     Gall stones     Hyperlipidemia     Hypertension     Macular degeneration     Myocardial infarction     Osteoporosis     Peripheral vascular disease     Stroke     Tobacco dependence     VF (ventricular fibrillation)    ,   Past Surgical History:   Procedure Laterality Date    CARDIAC CATHETERIZATION       SECTION      CHOLECYSTECTOMY     ,   Family History   Problem Relation Age of Onset    COPD Mother     Heart disease Father     Heart attack Sister     COPD Sister     Kidney cancer Sister     No Known Problems Sister     Hypertension Brother     Hypertension Brother     Breast cancer Neg Hx    ,   Social History     Tobacco Use    Smoking status: Former     Packs/day: 1.5     Types: Cigarettes     Quit date: 2021     Years since quittin.6    Smokeless tobacco: Never   Vaping Use    Vaping Use: Never used   Substance Use Topics    Alcohol use: Not Currently    Drug use: Never   ,     Medications  Current Outpatient Medications   Medication Sig Dispense Refill    albuterol sulfate  (90 Base) MCG/ACT inhaler Inhale 2 puffs Every 4 (Four) Hours As Needed for Wheezing or Shortness of Air.      ascorbic acid (VITAMIN C)  1000 MG tablet Take 250 mg by mouth Daily.      atenolol (TENORMIN) 50 MG tablet TAKE 1 TABLET BY MOUTH EVERY DAY 90 tablet 3    atorvastatin (LIPITOR) 80 MG tablet Take 1 tablet by mouth Every Night. 30 tablet 1    Cholecalciferol 25 MCG (1000 UT) tablet Take 1 tablet by mouth Daily.      citalopram (CeleXA) 10 MG tablet Take 1 tablet by mouth Daily.      clopidogrel (PLAVIX) 75 MG tablet Take 1 tablet by mouth Daily. 30 tablet 1    cyclobenzaprine (FLEXERIL) 10 MG tablet Take 1 tablet by mouth 2 (Two) Times a Day As Needed for Muscle Spasms.      famotidine (PEPCID) 40 MG tablet Daily.      fluticasone (FLONASE) 50 MCG/ACT nasal spray 2 sprays by Each Nare route Daily.      Fluticasone Furoate-Vilanterol (Breo Ellipta) 100-25 MCG/INH inhaler Inhale 1 puff Daily.      furosemide (LASIX) 20 MG tablet TAKE ONE TABLET BY MOUTH ONCE DAILY FOR EDEMA      gabapentin (NEURONTIN) 300 MG capsule Take 1 capsule by mouth 3 (Three) Times a Day.      metFORMIN ER (GLUCOPHAGE-XR) 750 MG 24 hr tablet TAKE ONE TABLET BY MOUTH ONCE DAILY EVERY EVENING WITH MEALS      nitroglycerin (NITRODUR) 0.2 MG/HR patch PLACE 1 PATCH ON THE SKIN AS DIRECTED BY PROVIDER DAILY. 90 patch 3    omeprazole (priLOSEC) 20 MG capsule Take 1 capsule by mouth Daily.      triamcinolone (KENALOG) 0.1 % cream Apply 1 application  topically to the appropriate area as directed 2 (Two) Times a Day.       No current facility-administered medications for this visit.       Allergies:  Adhesive tape    Review of Systems  Review of Systems   HENT:  Negative for nosebleeds.    Eyes:  Positive for blurred vision.   Cardiovascular:  Negative for chest pain, claudication, dyspnea on exertion, leg swelling, near-syncope, orthopnea, palpitations, paroxysmal nocturnal dyspnea and syncope.   Respiratory:  Negative for hemoptysis and shortness of breath.    Gastrointestinal:  Negative for melena.   Genitourinary:  Negative for hematuria.       Objective     Physical  "Exam:  /60   Pulse 64   Ht 152.4 cm (60\")   Wt 87.5 kg (193 lb)   SpO2 97%   BMI 37.69 kg/m²   Pulmonary:      Effort: Pulmonary effort is normal.      Breath sounds: Normal breath sounds.   Cardiovascular:      Normal rate. Regular rhythm.      Murmurs: There is no murmur.   Edema:     Peripheral edema absent.         Results Review:    ECG 12 Lead    Date/Time: 11/10/2023 10:23 AM  Performed by: Prashant Moctezuma MD    Authorized by: Prashant Moctezuma MD  Comparison: compared with previous ECG   Similar to previous ECG  Rhythm: sinus rhythm  Rate: normal  Conduction: conduction normal  ST Segments: ST segments normal  T Waves: T waves normal  QRS axis: normal    Clinical impression: normal ECG          Admission on 04/13/2021, Discharged on 04/14/2021   Component Date Value Ref Range Status    QT Interval 04/13/2021 428  ms Final    QTC Interval 04/13/2021 475  ms Final    Glucose 04/13/2021 102 (H)  65 - 99 mg/dL Final    BUN 04/13/2021 17  8 - 23 mg/dL Final    Creatinine 04/13/2021 0.68  0.57 - 1.00 mg/dL Final    Sodium 04/13/2021 144  136 - 145 mmol/L Final    Potassium 04/13/2021 4.5  3.5 - 5.2 mmol/L Final    Chloride 04/13/2021 105  98 - 107 mmol/L Final    CO2 04/13/2021 30.0 (H)  22.0 - 29.0 mmol/L Final    Calcium 04/13/2021 8.9  8.6 - 10.5 mg/dL Final    Total Protein 04/13/2021 6.7  6.0 - 8.5 g/dL Final    Albumin 04/13/2021 4.00  3.50 - 5.20 g/dL Final    ALT (SGPT) 04/13/2021 22  1 - 33 U/L Final    AST (SGOT) 04/13/2021 16  1 - 32 U/L Final    Alkaline Phosphatase 04/13/2021 176 (H)  39 - 117 U/L Final    Total Bilirubin 04/13/2021 0.2  0.0 - 1.2 mg/dL Final    eGFR Non  Amer 04/13/2021 88  >60 mL/min/1.73 Final    eGFR  African Amer 04/13/2021 106  >60 mL/min/1.73 Final    Globulin 04/13/2021 2.7  gm/dL Final    A/G Ratio 04/13/2021 1.5  g/dL Final    BUN/Creatinine Ratio 04/13/2021 25.0  7.0 - 25.0 Final    Anion Gap 04/13/2021 9.0  5.0 - 15.0 mmol/L Final    Protime 04/13/2021 12.9  " 11.9 - 14.6 Seconds Final    INR 04/13/2021 1.01  0.91 - 1.09 Final    ABO Type 04/13/2021 B   Final    RH type 04/13/2021 Positive   Final    Antibody Screen 04/13/2021 Negative   Final    T&S Expiration Date 04/13/2021 4/16/2021 11:59:59 PM   Final    WBC 04/13/2021 6.95  3.40 - 10.80 10*3/mm3 Final    RBC 04/13/2021 5.01  3.77 - 5.28 10*6/mm3 Final    Hemoglobin 04/13/2021 13.2  12.0 - 15.9 g/dL Final    Hematocrit 04/13/2021 41.7  34.0 - 46.6 % Final    MCV 04/13/2021 83.2  79.0 - 97.0 fL Final    MCH 04/13/2021 26.3 (L)  26.6 - 33.0 pg Final    MCHC 04/13/2021 31.7  31.5 - 35.7 g/dL Final    RDW 04/13/2021 15.2  12.3 - 15.4 % Final    RDW-SD 04/13/2021 45.6  37.0 - 54.0 fl Final    MPV 04/13/2021 9.3  6.0 - 12.0 fL Final    Platelets 04/13/2021 280  140 - 450 10*3/mm3 Final    Neutrophil % 04/13/2021 59.7  42.7 - 76.0 % Final    Lymphocyte % 04/13/2021 32.7  19.6 - 45.3 % Final    Monocyte % 04/13/2021 6.6  5.0 - 12.0 % Final    Eosinophil % 04/13/2021 0.1 (L)  0.3 - 6.2 % Final    Basophil % 04/13/2021 0.6  0.0 - 1.5 % Final    Immature Grans % 04/13/2021 0.3  0.0 - 0.5 % Final    Neutrophils, Absolute 04/13/2021 4.15  1.70 - 7.00 10*3/mm3 Final    Lymphocytes, Absolute 04/13/2021 2.27  0.70 - 3.10 10*3/mm3 Final    Monocytes, Absolute 04/13/2021 0.46  0.10 - 0.90 10*3/mm3 Final    Eosinophils, Absolute 04/13/2021 0.01  0.00 - 0.40 10*3/mm3 Final    Basophils, Absolute 04/13/2021 0.04  0.00 - 0.20 10*3/mm3 Final    Immature Grans, Absolute 04/13/2021 0.02  0.00 - 0.05 10*3/mm3 Final    nRBC 04/13/2021 0.0  0.0 - 0.2 /100 WBC Final    Extra Tube 04/13/2021 hold for add-on   Final    Auto resulted    Extra Tube 04/13/2021 Hold for add-ons.   Final    Auto resulted.    Extra Tube 04/13/2021 hold for add-on   Final    Auto resulted    Extra Tube 04/13/2021 Hold for add-ons.   Final    Auto resulted.    THC, Screen, Urine 04/13/2021 Positive (A)  Negative Final    Phencyclidine (PCP), Urine 04/13/2021 Negative   Negative Final    Cocaine Screen, Urine 04/13/2021 Negative  Negative Final    Methamphetamine, Ur 04/13/2021 Negative  Negative Final    Opiate Screen 04/13/2021 Negative  Negative Final    Amphetamine Screen, Urine 04/13/2021 Negative  Negative Final    Benzodiazepine Screen, Urine 04/13/2021 Negative  Negative Final    Tricyclic Antidepressants Screen 04/13/2021 Positive (A)  Negative Final    Methadone Screen, Urine 04/13/2021 Negative  Negative Final    Barbiturates Screen, Urine 04/13/2021 Negative  Negative Final    Oxycodone Screen, Urine 04/13/2021 Negative  Negative Final    Propoxyphene Screen 04/13/2021 Negative  Negative Final    Buprenorphine, Screen, Urine 04/13/2021 Negative  Negative Final    Color, UA 04/13/2021 Yellow  Yellow, Straw Final    Appearance, UA 04/13/2021 Clear  Clear Final    pH, UA 04/13/2021 6.0  5.0 - 8.0 Final    Specific Gravity, UA 04/13/2021 >1.030 (H)  1.005 - 1.030 Final    Glucose, UA 04/13/2021 Negative  Negative Final    Ketones, UA 04/13/2021 Negative  Negative Final    Bilirubin, UA 04/13/2021 Negative  Negative Final    Blood, UA 04/13/2021 Negative  Negative Final    Protein, UA 04/13/2021 Negative  Negative Final    Leuk Esterase, UA 04/13/2021 Negative  Negative Final    Nitrite, UA 04/13/2021 Negative  Negative Final    Urobilinogen, UA 04/13/2021 1.0 E.U./dL  0.2 - 1.0 E.U./dL Final    TSH 04/13/2021 0.813  0.270 - 4.200 uIU/mL Final    Ethanol % 04/13/2021 <0.010  % Final    COVID19 04/13/2021 Presumptive Negative  Presumptive Negative - Ref. Range Final    Glucose 04/13/2021 95  70 - 130 mg/dL Final    : [id number] Quinn KristaMeter ID: [id number]    Hemoglobin A1C 04/14/2021 6.40 (H)  4.80 - 5.60 % Final    Total Cholesterol 04/14/2021 111  0 - 200 mg/dL Final    Triglycerides 04/14/2021 122  0 - 150 mg/dL Final    HDL Cholesterol 04/14/2021 25 (L)  40 - 60 mg/dL Final    LDL Cholesterol  04/14/2021 64  0 - 100 mg/dL Final    VLDL Cholesterol 04/14/2021 22   5 - 40 mg/dL Final    LDL/HDL Ratio 04/14/2021 2.46   Final    Glucose 04/14/2021 91  65 - 99 mg/dL Final    BUN 04/14/2021 14  8 - 23 mg/dL Final    Creatinine 04/14/2021 0.56 (L)  0.57 - 1.00 mg/dL Final    Sodium 04/14/2021 145  136 - 145 mmol/L Final    Potassium 04/14/2021 4.1  3.5 - 5.2 mmol/L Final    Slight hemolysis detected by analyzer. Results may be affected.    Chloride 04/14/2021 104  98 - 107 mmol/L Final    CO2 04/14/2021 31.0 (H)  22.0 - 29.0 mmol/L Final    Calcium 04/14/2021 8.8  8.6 - 10.5 mg/dL Final    Total Protein 04/14/2021 6.6  6.0 - 8.5 g/dL Final    Albumin 04/14/2021 3.80  3.50 - 5.20 g/dL Final    ALT (SGPT) 04/14/2021 19  1 - 33 U/L Final    AST (SGOT) 04/14/2021 17  1 - 32 U/L Final    Alkaline Phosphatase 04/14/2021 160 (H)  39 - 117 U/L Final    Total Bilirubin 04/14/2021 0.3  0.0 - 1.2 mg/dL Final    eGFR Non African Amer 04/14/2021 110  >60 mL/min/1.73 Final    Globulin 04/14/2021 2.8  gm/dL Final    A/G Ratio 04/14/2021 1.4  g/dL Final    BUN/Creatinine Ratio 04/14/2021 25.0  7.0 - 25.0 Final    Anion Gap 04/14/2021 10.0  5.0 - 15.0 mmol/L Final    WBC 04/14/2021 5.57  3.40 - 10.80 10*3/mm3 Final    RBC 04/14/2021 5.07  3.77 - 5.28 10*6/mm3 Final    Hemoglobin 04/14/2021 13.4  12.0 - 15.9 g/dL Final    Hematocrit 04/14/2021 42.2  34.0 - 46.6 % Final    MCV 04/14/2021 83.2  79.0 - 97.0 fL Final    MCH 04/14/2021 26.4 (L)  26.6 - 33.0 pg Final    MCHC 04/14/2021 31.8  31.5 - 35.7 g/dL Final    RDW 04/14/2021 15.3  12.3 - 15.4 % Final    RDW-SD 04/14/2021 45.6  37.0 - 54.0 fl Final    MPV 04/14/2021 9.2  6.0 - 12.0 fL Final    Platelets 04/14/2021 258  140 - 450 10*3/mm3 Final    TSH 04/14/2021 0.763  0.270 - 4.200 uIU/mL Final    Vitamin B-12 04/14/2021 372  211 - 946 pg/mL Final    BSA 04/14/2021 1.9  m^2 Final    IVSd 04/14/2021 1.3  cm Final    LVIDd 04/14/2021 3.0  cm Final    LVIDs 04/14/2021 2.5  cm Final    LVPWd 04/14/2021 0.9  cm Final    IVS/LVPW 04/14/2021 1.4   Final     FS 04/14/2021 18.2  % Final    EDV(Teich) 04/14/2021 35.9  ml Final    ESV(Teich) 04/14/2021 21.9  ml Final    EF(Teich) 04/14/2021 39.0  % Final    EDV(cubed) 04/14/2021 27.8  ml Final    ESV(cubed) 04/14/2021 15.3  ml Final    EF(cubed) 04/14/2021 45.2  % Final    LV mass(C)d 04/14/2021 96.8  grams Final    LV mass(C)dI 04/14/2021 52.3  grams/m^2 Final    SV(Teich) 04/14/2021 14.0  ml Final    SI(Teich) 04/14/2021 7.6  ml/m^2 Final    SV(cubed) 04/14/2021 12.6  ml Final    SI(cubed) 04/14/2021 6.8  ml/m^2 Final    Ao root diam 04/14/2021 2.5  cm Final    Ao root area 04/14/2021 4.9  cm^2 Final    LA dimension (2D)  04/14/2021 2.8  cm Final    LA/Ao 04/14/2021 1.1   Final    LVOT diam 04/14/2021 2.0  cm Final    LVOT area 04/14/2021 3.1  cm^2 Final    LVOT area(traced) 04/14/2021 3.1  cm^2 Final    LVLd ap4 04/14/2021 7.4  cm Final    EDV(MOD-sp4) 04/14/2021 80.2  ml Final    LVLs ap4 04/14/2021 5.9  cm Final    ESV(MOD-sp4) 04/14/2021 19.7  ml Final    EF(MOD-sp4) 04/14/2021 75.4  % Final    SV(MOD-sp4) 04/14/2021 60.5  ml Final    SI(MOD-sp4) 04/14/2021 32.7  ml/m^2 Final    Ao root area (BSA corrected) 04/14/2021 1.4   Final    LV Alvarado Vol (BSA corrected) 04/14/2021 43.3  ml/m^2 Final    LV Sys Vol (BSA corrected) 04/14/2021 10.6  ml/m^2 Final    MV E max norberto 04/14/2021 117.0  cm/sec Final    MV A max norberto 04/14/2021 144.0  cm/sec Final    MV E/A 04/14/2021 0.81   Final    MV dec time 04/14/2021 0.44  sec Final    Ao pk norberto 04/14/2021 153.0  cm/sec Final    Ao max PG 04/14/2021 9.4  mmHg Final    Ao max PG (full) 04/14/2021 7.2  mmHg Final    Ao V2 mean 04/14/2021 102.0  cm/sec Final    Ao mean PG 04/14/2021 5.0  mmHg Final    Ao mean PG (full) 04/14/2021 4.0  mmHg Final    Ao V2 VTI 04/14/2021 37.3  cm Final    ALEXYS(I,A) 04/14/2021 1.4  cm^2 Final    ALEXYS(I,D) 04/14/2021 1.4  cm^2 Final    ALEXYS(V,A) 04/14/2021 1.5  cm^2 Final    ALEXYS(V,D) 04/14/2021 1.5  cm^2 Final    LV V1 max PG 04/14/2021 2.1  mmHg Final    LV V1  mean PG 04/14/2021 1.0  mmHg Final    LV V1 max 04/14/2021 73.2  cm/sec Final    LV V1 mean 04/14/2021 46.5  cm/sec Final    LV V1 VTI 04/14/2021 17.0  cm Final    MR max rip 04/14/2021 487.3  cm/sec Final    MR max PG 04/14/2021 95.7  mmHg Final    MR mean rip 04/14/2021 402.0  cm/sec Final    MR mean PG 04/14/2021 73.0  mmHg Final    MR VTI 04/14/2021 187.0  cm Final    SV(Ao) 04/14/2021 183.1  ml Final    SI(Ao) 04/14/2021 98.9  ml/m^2 Final    SV(LVOT) 04/14/2021 53.4  ml Final    SI(LVOT) 04/14/2021 28.9  ml/m^2 Final     CV ECHO SHAVONNE - BZI_BMI 04/14/2021 38.3  kilograms/m^2 Final     CV ECHO SHAVONNE - BSA([place]) 04/14/2021 2.0  m^2 Final     CV ECHO SHAVONNE - BZI_METRIC_WEIGHT 04/14/2021 88.9  kg Final     CV ECHO SHAVONNE - BZI_METRIC_HEIGHT 04/14/2021 152.4  cm Final    Target HR (85%) 04/14/2021 134  bpm Final    Max. Pred. HR (100%) 04/14/2021 158  bpm Final    LA ESV (BP) 04/14/2021 43.2  cm3 Final    LA ESV Index (BP) 04/14/2021 23.4  mL/m2 Final    Avg E/e' ratio 04/14/2021 20.54   Final    Lat Peak E' Rip 04/14/2021 5.3  cm/sec Final    Med Peak E' Rip 04/14/2021 6.09  cm/sec Final       Assessment & Plan   Problem List Items Addressed This Visit          Cardiac and Vasculature    Coronary artery disease involving native coronary artery of native heart without angina pectoris    Current Assessment & Plan     The patient denies chest pain, shortness of breath, dyspnea on exertion, orthopnea, PND, edema. There is no evidence of ongoing ischemia           Mixed hyperlipidemia - Primary    Current Assessment & Plan     Adequate response to treatment         Essential hypertension    Current Assessment & Plan     Good control            Neuro    CVA (cerebral vascular accident)    Current Assessment & Plan     She has completely recovered with no residual            Medical Complexity  must have 1 out of 3     Moderate Complexity Level 3           1 of the following medical problems:          []One chronic  illness with mild exacerbation         [x]Two or more stable chronic illness          []One new problem  []One acute illness with systemic symptoms    Complexity of Data  Reviewed (1 out of the 3 following categories)      Category 1 tests, documents, historian (must have 3 points)       []Review of prior external records  [x]Review of results of unique tests  []Ordering unique tests  []Assessment requires an independent historian    Category 2 Interpretation of tests   []Independent interpretation of test read by another doc    Category 3 Discuss Management/tests  []Discussion with external physician    Risk of complications and/or morbidity          [x]Prescription Drug Management

## 2024-05-02 RX ORDER — ATENOLOL 50 MG/1
50 TABLET ORAL DAILY
Qty: 90 TABLET | Refills: 3 | Status: SHIPPED | OUTPATIENT
Start: 2024-05-02

## 2025-02-11 ENCOUNTER — HOSPITAL ENCOUNTER (OUTPATIENT)
Dept: WOMENS IMAGING | Age: 65
Discharge: HOME | End: 2025-02-11
Payer: MEDICARE

## 2025-02-11 DIAGNOSIS — Z12.31 OTHER SCREENING MAMMOGRAM: ICD-10-CM

## 2025-02-11 PROCEDURE — 77067 SCR MAMMO BI INCL CAD: CPT

## 2025-02-19 ENCOUNTER — TELEPHONE (OUTPATIENT)
Dept: CARDIOLOGY CLINIC | Age: 65
End: 2025-02-19

## 2025-02-19 NOTE — TELEPHONE ENCOUNTER
Called and left v/m for patient to r/s appointment due the office being closed for inclement weather.  Advised to please call back to r/s.

## 2025-03-06 ENCOUNTER — OFFICE VISIT (OUTPATIENT)
Dept: CARDIOLOGY CLINIC | Age: 65
End: 2025-03-06
Payer: MEDICARE

## 2025-03-06 VITALS
BODY MASS INDEX: 37.5 KG/M2 | WEIGHT: 191 LBS | HEIGHT: 60 IN | DIASTOLIC BLOOD PRESSURE: 92 MMHG | HEART RATE: 64 BPM | SYSTOLIC BLOOD PRESSURE: 130 MMHG

## 2025-03-06 DIAGNOSIS — E11.69 TYPE 2 DIABETES MELLITUS WITH OTHER SPECIFIED COMPLICATION, WITHOUT LONG-TERM CURRENT USE OF INSULIN (HCC): ICD-10-CM

## 2025-03-06 DIAGNOSIS — E78.2 MIXED HYPERLIPIDEMIA: ICD-10-CM

## 2025-03-06 DIAGNOSIS — I25.10 CORONARY ARTERY DISEASE INVOLVING NATIVE CORONARY ARTERY OF NATIVE HEART WITHOUT ANGINA PECTORIS: Primary | ICD-10-CM

## 2025-03-06 DIAGNOSIS — I10 ESSENTIAL HYPERTENSION: ICD-10-CM

## 2025-03-06 DIAGNOSIS — I25.2 HISTORY OF MYOCARDIAL INFARCTION: ICD-10-CM

## 2025-03-06 DIAGNOSIS — F17.211 CIGARETTE NICOTINE DEPENDENCE IN REMISSION: ICD-10-CM

## 2025-03-06 PROBLEM — E11.9 DIABETES MELLITUS (HCC): Status: ACTIVE | Noted: 2025-03-06

## 2025-03-06 PROCEDURE — 1123F ACP DISCUSS/DSCN MKR DOCD: CPT | Performed by: CLINICAL NURSE SPECIALIST

## 2025-03-06 PROCEDURE — 3080F DIAST BP >= 90 MM HG: CPT | Performed by: CLINICAL NURSE SPECIALIST

## 2025-03-06 PROCEDURE — 93000 ELECTROCARDIOGRAM COMPLETE: CPT | Performed by: CLINICAL NURSE SPECIALIST

## 2025-03-06 PROCEDURE — 3017F COLORECTAL CA SCREEN DOC REV: CPT | Performed by: CLINICAL NURSE SPECIALIST

## 2025-03-06 PROCEDURE — 3046F HEMOGLOBIN A1C LEVEL >9.0%: CPT | Performed by: CLINICAL NURSE SPECIALIST

## 2025-03-06 PROCEDURE — 2022F DILAT RTA XM EVC RTNOPTHY: CPT | Performed by: CLINICAL NURSE SPECIALIST

## 2025-03-06 PROCEDURE — 99204 OFFICE O/P NEW MOD 45 MIN: CPT | Performed by: CLINICAL NURSE SPECIALIST

## 2025-03-06 PROCEDURE — G8399 PT W/DXA RESULTS DOCUMENT: HCPCS | Performed by: CLINICAL NURSE SPECIALIST

## 2025-03-06 PROCEDURE — 1159F MED LIST DOCD IN RCRD: CPT | Performed by: CLINICAL NURSE SPECIALIST

## 2025-03-06 PROCEDURE — G8417 CALC BMI ABV UP PARAM F/U: HCPCS | Performed by: CLINICAL NURSE SPECIALIST

## 2025-03-06 PROCEDURE — 1090F PRES/ABSN URINE INCON ASSESS: CPT | Performed by: CLINICAL NURSE SPECIALIST

## 2025-03-06 PROCEDURE — 3075F SYST BP GE 130 - 139MM HG: CPT | Performed by: CLINICAL NURSE SPECIALIST

## 2025-03-06 PROCEDURE — G8427 DOCREV CUR MEDS BY ELIG CLIN: HCPCS | Performed by: CLINICAL NURSE SPECIALIST

## 2025-03-06 PROCEDURE — 1036F TOBACCO NON-USER: CPT | Performed by: CLINICAL NURSE SPECIALIST

## 2025-03-06 RX ORDER — ALBUTEROL SULFATE 90 UG/1
AEROSOL, METERED RESPIRATORY (INHALATION)
COMMUNITY

## 2025-03-06 RX ORDER — CLOPIDOGREL BISULFATE 75 MG/1
75 TABLET ORAL DAILY
COMMUNITY
Start: 2024-12-16

## 2025-03-06 RX ORDER — ATORVASTATIN CALCIUM 80 MG/1
80 TABLET, FILM COATED ORAL DAILY
COMMUNITY

## 2025-03-06 RX ORDER — CHOLECALCIFEROL (VITAMIN D3) 25 MCG
1000 TABLET ORAL DAILY
COMMUNITY

## 2025-03-06 RX ORDER — TRIAMCINOLONE ACETONIDE 1 MG/G
CREAM TOPICAL
COMMUNITY

## 2025-03-06 ASSESSMENT — ENCOUNTER SYMPTOMS
COUGH: 0
CHEST TIGHTNESS: 0
VOMITING: 0
SHORTNESS OF BREATH: 0
EYE REDNESS: 0
FACIAL SWELLING: 0
NAUSEA: 0
ABDOMINAL PAIN: 0
WHEEZING: 0

## 2025-03-06 NOTE — PROGRESS NOTES
Placed This Encounter   Procedures    EKG 12 lead     Order Specific Question:   Reason for Exam?     Answer:   Other     Return in about 6 months (around 9/6/2025) for Dr De Leon.    Call with any questionsor concerns  Follow up with Boby Garcia MD for non cardiac problems  Report any new problems  Cardiovascular Fitness-Exercise as tolerated.  Strive for 15 minutes of exercise most days of the week.    Cardiac / HealthyDiet  Continue current medications as directed  Continue plan of treatment  It is always recommended that you bring your medicationsbottles with you to each visit - this is for your safety!       MANOLO Stover

## 2025-04-30 DIAGNOSIS — I25.10 CAD (CORONARY ARTERY DISEASE): ICD-10-CM

## 2025-04-30 RX ORDER — ATENOLOL 50 MG/1
TABLET ORAL
Qty: 90 TABLET | Refills: 3 | Status: SHIPPED | OUTPATIENT
Start: 2025-04-30 | End: 2025-05-02 | Stop reason: SDUPTHER

## 2025-05-02 DIAGNOSIS — I25.10 CAD (CORONARY ARTERY DISEASE): ICD-10-CM

## 2025-05-02 RX ORDER — ATENOLOL 50 MG/1
TABLET ORAL
Qty: 90 TABLET | Refills: 3 | Status: SHIPPED | OUTPATIENT
Start: 2025-05-02

## 2025-05-02 RX ORDER — ATENOLOL 50 MG/1
50 TABLET ORAL DAILY
Qty: 90 TABLET | Refills: 3 | OUTPATIENT
Start: 2025-05-02